# Patient Record
Sex: FEMALE | Race: BLACK OR AFRICAN AMERICAN | NOT HISPANIC OR LATINO | Employment: OTHER | URBAN - METROPOLITAN AREA
[De-identification: names, ages, dates, MRNs, and addresses within clinical notes are randomized per-mention and may not be internally consistent; named-entity substitution may affect disease eponyms.]

---

## 2023-03-20 DIAGNOSIS — M19.90 UNSPECIFIED OSTEOARTHRITIS, UNSPECIFIED SITE: ICD-10-CM

## 2023-03-20 DIAGNOSIS — E78.00 PURE HYPERCHOLESTEROLEMIA, UNSPECIFIED: ICD-10-CM

## 2023-03-20 RX ORDER — DICLOFENAC SODIUM 10 MG/G
GEL TOPICAL
Qty: 200 G | Refills: 0 | Status: SHIPPED | OUTPATIENT
Start: 2023-03-20 | End: 2023-04-26

## 2023-03-20 RX ORDER — ATORVASTATIN CALCIUM 20 MG/1
TABLET, FILM COATED ORAL
Qty: 90 TABLET | Refills: 0 | Status: SHIPPED | OUTPATIENT
Start: 2023-03-20 | End: 2023-08-28 | Stop reason: SDUPTHER

## 2023-04-17 ENCOUNTER — TELEPHONE (OUTPATIENT)
Dept: PRIMARY CARE | Facility: CLINIC | Age: 73
End: 2023-04-17
Payer: MEDICAID

## 2023-04-26 DIAGNOSIS — M19.90 UNSPECIFIED OSTEOARTHRITIS, UNSPECIFIED SITE: ICD-10-CM

## 2023-04-26 RX ORDER — DICLOFENAC SODIUM 10 MG/G
GEL TOPICAL
Qty: 200 G | Refills: 0 | Status: SHIPPED | OUTPATIENT
Start: 2023-04-26 | End: 2023-06-02

## 2023-06-02 DIAGNOSIS — M19.90 UNSPECIFIED OSTEOARTHRITIS, UNSPECIFIED SITE: ICD-10-CM

## 2023-06-02 RX ORDER — DICLOFENAC SODIUM 10 MG/G
GEL TOPICAL
Qty: 200 G | Refills: 0 | Status: SHIPPED | OUTPATIENT
Start: 2023-06-02 | End: 2023-07-06

## 2023-08-08 LAB
CHOLESTEROL (MG/DL) IN SER/PLAS: 163 MG/DL (ref 0–199)
CHOLESTEROL IN HDL (MG/DL) IN SER/PLAS: 57.5 MG/DL
CHOLESTEROL/HDL RATIO: 2.8
LDL: 88 MG/DL (ref 0–99)
TRIGLYCERIDE (MG/DL) IN SER/PLAS: 89 MG/DL (ref 0–149)
VLDL: 18 MG/DL (ref 0–40)

## 2023-08-25 PROBLEM — H43.819 VITREOUS DEGENERATION: Status: ACTIVE | Noted: 2023-08-25
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
## 2023-08-25 PROBLEM — Z86.010 HISTORY OF COLON POLYPS: Status: ACTIVE | Noted: 2023-08-25

## 2023-08-25 PROBLEM — M54.10 RADICULOPATHY: Status: ACTIVE | Noted: 2023-08-25

## 2023-08-25 PROBLEM — F17.200 NEEDS SMOKING CESSATION EDUCATION: Status: ACTIVE | Noted: 2023-08-25

## 2023-08-25 PROBLEM — K21.9 GERD (GASTROESOPHAGEAL REFLUX DISEASE): Status: ACTIVE | Noted: 2023-08-25

## 2023-08-25 PROBLEM — F17.200 TOBACCO USE DISORDER: Status: ACTIVE | Noted: 2023-08-25

## 2023-08-25 PROBLEM — M81.0 OSTEOPOROSIS: Status: ACTIVE | Noted: 2023-08-25

## 2023-08-25 PROBLEM — M26.629 TMJ PAIN DYSFUNCTION SYNDROME: Status: ACTIVE | Noted: 2023-08-25

## 2023-08-25 PROBLEM — N89.8 VAGINAL DISCHARGE: Status: ACTIVE | Noted: 2023-08-25

## 2023-08-25 PROBLEM — J30.9 ALLERGIC RHINITIS: Status: ACTIVE | Noted: 2023-08-25

## 2023-08-25 PROBLEM — S90.862A INSECT BITE OF LEFT FOOT: Status: ACTIVE | Noted: 2023-08-25

## 2023-08-25 PROBLEM — D72.829 LEUKOCYTOSIS: Status: ACTIVE | Noted: 2023-08-25

## 2023-08-25 PROBLEM — R00.1 BRADYCARDIA: Status: ACTIVE | Noted: 2023-08-25

## 2023-08-25 PROBLEM — H43.813 PVD (POSTERIOR VITREOUS DETACHMENT), BOTH EYES: Status: ACTIVE | Noted: 2023-08-25

## 2023-08-25 PROBLEM — N95.2 ATROPHY OF VAGINA: Status: ACTIVE | Noted: 2023-08-25

## 2023-08-25 PROBLEM — G89.29 CHRONIC PAIN: Status: ACTIVE | Noted: 2023-08-25

## 2023-08-25 PROBLEM — H52.4 BILATERAL PRESBYOPIA: Status: ACTIVE | Noted: 2023-08-25

## 2023-08-25 PROBLEM — H25.10 NUCLEAR SCLEROSIS: Status: ACTIVE | Noted: 2023-08-25

## 2023-08-25 PROBLEM — L02.91 ABSCESS: Status: ACTIVE | Noted: 2023-08-25

## 2023-08-25 PROBLEM — R73.9 HYPERGLYCEMIA: Status: ACTIVE | Noted: 2023-08-25

## 2023-08-25 PROBLEM — H52.03 HYPEROPIA OF BOTH EYES: Status: ACTIVE | Noted: 2023-08-25

## 2023-08-25 PROBLEM — E55.9 VITAMIN D DEFICIENCY: Status: ACTIVE | Noted: 2023-08-25

## 2023-08-25 PROBLEM — M81.0 AGE-RELATED OSTEOPOROSIS WITHOUT CURRENT PATHOLOGICAL FRACTURE: Status: ACTIVE | Noted: 2023-08-25

## 2023-08-25 PROBLEM — R21 RASH: Status: ACTIVE | Noted: 2023-08-25

## 2023-08-25 PROBLEM — N32.81 OAB (OVERACTIVE BLADDER): Status: ACTIVE | Noted: 2023-08-25

## 2023-08-25 PROBLEM — T40.2X5A CONSTIPATION DUE TO OPIOID THERAPY: Status: ACTIVE | Noted: 2023-08-25

## 2023-08-25 PROBLEM — R25.2 MUSCLE CRAMPS: Status: ACTIVE | Noted: 2023-08-25

## 2023-08-25 PROBLEM — H92.01 REFERRED OTALGIA OF RIGHT EAR: Status: ACTIVE | Noted: 2023-08-25

## 2023-08-25 PROBLEM — M75.52 BURSITIS OF LEFT SHOULDER: Status: ACTIVE | Noted: 2023-08-25

## 2023-08-25 PROBLEM — L29.9 ITCHING: Status: ACTIVE | Noted: 2023-08-25

## 2023-08-25 PROBLEM — M47.812 DJD (DEGENERATIVE JOINT DISEASE), CERVICAL: Status: ACTIVE | Noted: 2023-08-25

## 2023-08-25 PROBLEM — M54.50 LOW BACK PAIN: Status: ACTIVE | Noted: 2023-08-25

## 2023-08-25 PROBLEM — M19.90 OSTEOARTHRITIS: Status: ACTIVE | Noted: 2023-08-25

## 2023-08-25 PROBLEM — M62.831 CHARLEYHORSE: Status: ACTIVE | Noted: 2023-08-25

## 2023-08-25 PROBLEM — R06.2 WHEEZING WITHOUT DIAGNOSIS OF ASTHMA: Status: ACTIVE | Noted: 2023-08-25

## 2023-08-25 PROBLEM — G45.9 TIA (TRANSIENT ISCHEMIC ATTACK): Status: ACTIVE | Noted: 2023-08-25

## 2023-08-25 PROBLEM — L82.1 OTHER SEBORRHEIC KERATOSIS: Status: ACTIVE | Noted: 2018-01-10

## 2023-08-25 PROBLEM — M25.50 CHRONIC PAIN OF MULTIPLE JOINTS: Status: ACTIVE | Noted: 2023-08-25

## 2023-08-25 PROBLEM — D22.5 MELANOCYTIC NEVI OF TRUNK: Status: ACTIVE | Noted: 2018-01-10

## 2023-08-25 PROBLEM — I10 BENIGN ESSENTIAL HYPERTENSION: Status: ACTIVE | Noted: 2023-08-25

## 2023-08-25 PROBLEM — R10.2 PAIN, PELVIC, FEMALE: Status: ACTIVE | Noted: 2023-08-25

## 2023-08-25 PROBLEM — K22.2 SCHATZKI'S RING: Status: ACTIVE | Noted: 2023-08-25

## 2023-08-25 PROBLEM — E89.0 POSTOPERATIVE HYPOTHYROIDISM: Status: ACTIVE | Noted: 2023-08-25

## 2023-08-25 PROBLEM — E87.0 HYPERNATREMIA: Status: ACTIVE | Noted: 2023-08-25

## 2023-08-25 PROBLEM — S83.419A SPRAIN OF MCL (MEDIAL COLLATERAL LIGAMENT) OF KNEE: Status: ACTIVE | Noted: 2023-08-25

## 2023-08-25 PROBLEM — H04.123 DRY EYE SYNDROME OF BOTH LACRIMAL GLANDS: Status: ACTIVE | Noted: 2023-08-25

## 2023-08-25 PROBLEM — R10.9 ABDOMINAL PAIN: Status: ACTIVE | Noted: 2023-08-25

## 2023-08-25 PROBLEM — H02.886 MEIBOMIAN GLAND DYSFUNCTION (MGD) OF LEFT EYE: Status: ACTIVE | Noted: 2023-08-25

## 2023-08-25 PROBLEM — K57.90 DIVERTICULOSIS: Status: ACTIVE | Noted: 2023-08-25

## 2023-08-25 PROBLEM — M85.80 OSTEOPENIA: Status: ACTIVE | Noted: 2023-08-25

## 2023-08-25 PROBLEM — G89.29 CHRONIC PAIN OF MULTIPLE JOINTS: Status: ACTIVE | Noted: 2023-08-25

## 2023-08-25 PROBLEM — Z86.0100 HISTORY OF COLON POLYPS: Status: ACTIVE | Noted: 2023-08-25

## 2023-08-25 PROBLEM — M67.52 SYNOVIAL PLICA SYNDROME OF LEFT KNEE: Status: ACTIVE | Noted: 2023-08-25

## 2023-08-25 PROBLEM — L81.9 PIGMENTED SKIN LESION OF UNCERTAIN NATURE: Status: ACTIVE | Noted: 2023-08-25

## 2023-08-25 PROBLEM — M54.12 CERVICAL RADICULITIS: Status: ACTIVE | Noted: 2023-08-25

## 2023-08-25 PROBLEM — G89.18 POSTOPERATIVE PAIN: Status: ACTIVE | Noted: 2023-08-25

## 2023-08-25 PROBLEM — M25.569 KNEE PAIN: Status: ACTIVE | Noted: 2023-08-25

## 2023-08-25 PROBLEM — E78.00 HYPERCHOLESTEROLEMIA: Status: ACTIVE | Noted: 2023-08-25

## 2023-08-25 PROBLEM — R94.31 ELECTROCARDIOGRAM ABNORMAL: Status: ACTIVE | Noted: 2023-08-25

## 2023-08-25 PROBLEM — K59.03 CONSTIPATION DUE TO OPIOID THERAPY: Status: ACTIVE | Noted: 2023-08-25

## 2023-08-25 PROBLEM — H25.13 AGE-RELATED NUCLEAR CATARACT OF BOTH EYES: Status: ACTIVE | Noted: 2023-08-25

## 2023-08-25 PROBLEM — R09.81 NASAL SINUS CONGESTION: Status: ACTIVE | Noted: 2023-08-25

## 2023-08-25 PROBLEM — M75.01 ADHESIVE CAPSULITIS OF RIGHT SHOULDER: Status: ACTIVE | Noted: 2023-08-25

## 2023-08-25 PROBLEM — G35 MULTIPLE SCLEROSIS (MULTI): Status: ACTIVE | Noted: 2023-08-25

## 2023-08-25 PROBLEM — F17.200 NICOTINE DEPENDENCE: Status: ACTIVE | Noted: 2023-08-25

## 2023-08-25 PROBLEM — N18.31 CHRONIC KIDNEY DISEASE, STAGE 3A (MULTI): Status: ACTIVE | Noted: 2023-08-25

## 2023-08-25 PROBLEM — F12.90 MARIJUANA USE: Status: ACTIVE | Noted: 2023-08-25

## 2023-08-25 PROBLEM — E87.8 HYPERCHLOREMIA: Status: ACTIVE | Noted: 2023-08-25

## 2023-08-25 PROBLEM — R33.9 INCOMPLETE BLADDER EMPTYING: Status: ACTIVE | Noted: 2023-08-25

## 2023-08-28 ENCOUNTER — LAB (OUTPATIENT)
Dept: LAB | Facility: LAB | Age: 73
End: 2023-08-28
Payer: COMMERCIAL

## 2023-08-28 ENCOUNTER — OFFICE VISIT (OUTPATIENT)
Dept: PRIMARY CARE | Facility: CLINIC | Age: 73
End: 2023-08-28
Payer: COMMERCIAL

## 2023-08-28 VITALS
HEART RATE: 68 BPM | WEIGHT: 124.6 LBS | OXYGEN SATURATION: 98 % | SYSTOLIC BLOOD PRESSURE: 126 MMHG | RESPIRATION RATE: 18 BRPM | BODY MASS INDEX: 22.08 KG/M2 | DIASTOLIC BLOOD PRESSURE: 70 MMHG | HEIGHT: 63 IN

## 2023-08-28 DIAGNOSIS — N18.31 CHRONIC KIDNEY DISEASE, STAGE 3A (MULTI): ICD-10-CM

## 2023-08-28 DIAGNOSIS — F17.200 NICOTINE DEPENDENCE, UNCOMPLICATED, UNSPECIFIED NICOTINE PRODUCT TYPE: ICD-10-CM

## 2023-08-28 DIAGNOSIS — I10 BENIGN ESSENTIAL HYPERTENSION: ICD-10-CM

## 2023-08-28 DIAGNOSIS — Z12.11 SCREENING FOR MALIGNANT NEOPLASM OF COLON: ICD-10-CM

## 2023-08-28 DIAGNOSIS — R73.9 HYPERGLYCEMIA: ICD-10-CM

## 2023-08-28 DIAGNOSIS — F17.210 NICOTINE DEPENDENCE, CIGARETTES, UNCOMPLICATED: ICD-10-CM

## 2023-08-28 DIAGNOSIS — E78.00 HYPERCHOLESTEROLEMIA: ICD-10-CM

## 2023-08-28 DIAGNOSIS — E78.00 HYPERCHOLESTEROLEMIA: Primary | ICD-10-CM

## 2023-08-28 DIAGNOSIS — Z12.31 ENCOUNTER FOR SCREENING MAMMOGRAM FOR MALIGNANT NEOPLASM OF BREAST: ICD-10-CM

## 2023-08-28 DIAGNOSIS — E78.00 PURE HYPERCHOLESTEROLEMIA, UNSPECIFIED: ICD-10-CM

## 2023-08-28 PROBLEM — R26.89 ANTALGIC GAIT: Status: ACTIVE | Noted: 2023-06-16

## 2023-08-28 PROBLEM — M24.575 CONTRACTURE OF JOINT OF LEFT FOOT: Status: ACTIVE | Noted: 2023-06-16

## 2023-08-28 PROBLEM — M20.32 HALLUX VARUS (ACQUIRED), LEFT FOOT: Status: ACTIVE | Noted: 2023-06-16

## 2023-08-28 PROBLEM — R20.8 OTHER DISTURBANCES OF SKIN SENSATION: Status: ACTIVE | Noted: 2023-06-16

## 2023-08-28 PROBLEM — M77.42 METATARSALGIA OF LEFT FOOT: Status: ACTIVE | Noted: 2023-06-16

## 2023-08-28 PROBLEM — M20.42 HAMMERTOE OF SECOND TOE OF LEFT FOOT: Status: ACTIVE | Noted: 2023-06-16

## 2023-08-28 LAB
ALANINE AMINOTRANSFERASE (SGPT) (U/L) IN SER/PLAS: 9 U/L (ref 7–45)
ALBUMIN (G/DL) IN SER/PLAS: 4.2 G/DL (ref 3.4–5)
ALBUMIN (MG/L) IN URINE: 34.2 MG/L
ALBUMIN/CREATININE (UG/MG) IN URINE: 52.8 UG/MG CRT (ref 0–30)
ALKALINE PHOSPHATASE (U/L) IN SER/PLAS: 65 U/L (ref 33–136)
ANION GAP IN SER/PLAS: 14 MMOL/L (ref 10–20)
ASPARTATE AMINOTRANSFERASE (SGOT) (U/L) IN SER/PLAS: 13 U/L (ref 9–39)
BASOPHILS (10*3/UL) IN BLOOD BY AUTOMATED COUNT: 0.05 X10E9/L (ref 0–0.1)
BASOPHILS/100 LEUKOCYTES IN BLOOD BY AUTOMATED COUNT: 0.6 % (ref 0–2)
BILIRUBIN TOTAL (MG/DL) IN SER/PLAS: 0.5 MG/DL (ref 0–1.2)
CALCIUM (MG/DL) IN SER/PLAS: 9.6 MG/DL (ref 8.6–10.6)
CARBON DIOXIDE, TOTAL (MMOL/L) IN SER/PLAS: 26 MMOL/L (ref 21–32)
CHLORIDE (MMOL/L) IN SER/PLAS: 105 MMOL/L (ref 98–107)
CHOLESTEROL (MG/DL) IN SER/PLAS: 162 MG/DL (ref 0–199)
CHOLESTEROL IN HDL (MG/DL) IN SER/PLAS: 55.6 MG/DL
CHOLESTEROL/HDL RATIO: 2.9
CREATININE (MG/DL) IN SER/PLAS: 1.1 MG/DL (ref 0.5–1.05)
CREATININE (MG/DL) IN URINE: 64.8 MG/DL (ref 20–320)
EOSINOPHILS (10*3/UL) IN BLOOD BY AUTOMATED COUNT: 0.33 X10E9/L (ref 0–0.4)
EOSINOPHILS/100 LEUKOCYTES IN BLOOD BY AUTOMATED COUNT: 4.1 % (ref 0–6)
ERYTHROCYTE DISTRIBUTION WIDTH (RATIO) BY AUTOMATED COUNT: 14.8 % (ref 11.5–14.5)
ERYTHROCYTE MEAN CORPUSCULAR HEMOGLOBIN CONCENTRATION (G/DL) BY AUTOMATED: 31.8 G/DL (ref 32–36)
ERYTHROCYTE MEAN CORPUSCULAR VOLUME (FL) BY AUTOMATED COUNT: 88 FL (ref 80–100)
ERYTHROCYTES (10*6/UL) IN BLOOD BY AUTOMATED COUNT: 4.05 X10E12/L (ref 4–5.2)
ESTIMATED AVERAGE GLUCOSE FOR HBA1C: 111 MG/DL
GFR FEMALE: 53 ML/MIN/1.73M2
GLUCOSE (MG/DL) IN SER/PLAS: 87 MG/DL (ref 74–99)
HEMATOCRIT (%) IN BLOOD BY AUTOMATED COUNT: 35.8 % (ref 36–46)
HEMOGLOBIN (G/DL) IN BLOOD: 11.4 G/DL (ref 12–16)
HEMOGLOBIN A1C/HEMOGLOBIN TOTAL IN BLOOD: 5.5 %
IMMATURE GRANULOCYTES/100 LEUKOCYTES IN BLOOD BY AUTOMATED COUNT: 0.4 % (ref 0–0.9)
LDL: 80 MG/DL (ref 0–99)
LEUKOCYTES (10*3/UL) IN BLOOD BY AUTOMATED COUNT: 8 X10E9/L (ref 4.4–11.3)
LYMPHOCYTES (10*3/UL) IN BLOOD BY AUTOMATED COUNT: 2.07 X10E9/L (ref 0.8–3)
LYMPHOCYTES/100 LEUKOCYTES IN BLOOD BY AUTOMATED COUNT: 25.8 % (ref 13–44)
MONOCYTES (10*3/UL) IN BLOOD BY AUTOMATED COUNT: 0.55 X10E9/L (ref 0.05–0.8)
MONOCYTES/100 LEUKOCYTES IN BLOOD BY AUTOMATED COUNT: 6.9 % (ref 2–10)
NEUTROPHILS (10*3/UL) IN BLOOD BY AUTOMATED COUNT: 4.98 X10E9/L (ref 1.6–5.5)
NEUTROPHILS/100 LEUKOCYTES IN BLOOD BY AUTOMATED COUNT: 62.2 % (ref 40–80)
NRBC (PER 100 WBCS) BY AUTOMATED COUNT: 0 /100 WBC (ref 0–0)
PARATHYRIN INTACT (PG/ML) IN SER/PLAS: 51.9 PG/ML (ref 18.5–88)
PHOSPHATE (MG/DL) IN SER/PLAS: 4.1 MG/DL (ref 2.5–4.9)
PLATELETS (10*3/UL) IN BLOOD AUTOMATED COUNT: 386 X10E9/L (ref 150–450)
POTASSIUM (MMOL/L) IN SER/PLAS: 4.4 MMOL/L (ref 3.5–5.3)
PROTEIN TOTAL: 6.2 G/DL (ref 6.4–8.2)
SODIUM (MMOL/L) IN SER/PLAS: 141 MMOL/L (ref 136–145)
THYROTROPIN (MIU/L) IN SER/PLAS BY DETECTION LIMIT <= 0.05 MIU/L: 2.8 MIU/L (ref 0.44–3.98)
TRIGLYCERIDE (MG/DL) IN SER/PLAS: 133 MG/DL (ref 0–149)
UREA NITROGEN (MG/DL) IN SER/PLAS: 17 MG/DL (ref 6–23)
VLDL: 27 MG/DL (ref 0–40)

## 2023-08-28 PROCEDURE — 85025 COMPLETE CBC W/AUTO DIFF WBC: CPT

## 2023-08-28 PROCEDURE — 3074F SYST BP LT 130 MM HG: CPT | Performed by: INTERNAL MEDICINE

## 2023-08-28 PROCEDURE — 1125F AMNT PAIN NOTED PAIN PRSNT: CPT | Performed by: INTERNAL MEDICINE

## 2023-08-28 PROCEDURE — 1170F FXNL STATUS ASSESSED: CPT | Performed by: INTERNAL MEDICINE

## 2023-08-28 PROCEDURE — 84443 ASSAY THYROID STIM HORMONE: CPT

## 2023-08-28 PROCEDURE — 82043 UR ALBUMIN QUANTITATIVE: CPT

## 2023-08-28 PROCEDURE — 83970 ASSAY OF PARATHORMONE: CPT

## 2023-08-28 PROCEDURE — 80053 COMPREHEN METABOLIC PANEL: CPT

## 2023-08-28 PROCEDURE — 3078F DIAST BP <80 MM HG: CPT | Performed by: INTERNAL MEDICINE

## 2023-08-28 PROCEDURE — 82570 ASSAY OF URINE CREATININE: CPT

## 2023-08-28 PROCEDURE — 83036 HEMOGLOBIN GLYCOSYLATED A1C: CPT

## 2023-08-28 PROCEDURE — 36415 COLL VENOUS BLD VENIPUNCTURE: CPT

## 2023-08-28 PROCEDURE — 80061 LIPID PANEL: CPT

## 2023-08-28 PROCEDURE — 1159F MED LIST DOCD IN RCRD: CPT | Performed by: INTERNAL MEDICINE

## 2023-08-28 PROCEDURE — 84100 ASSAY OF PHOSPHORUS: CPT

## 2023-08-28 PROCEDURE — 99214 OFFICE O/P EST MOD 30 MIN: CPT | Performed by: INTERNAL MEDICINE

## 2023-08-28 RX ORDER — BUPROPION HYDROCHLORIDE 100 MG/1
100 TABLET, EXTENDED RELEASE ORAL DAILY PRN
COMMUNITY
Start: 2023-05-04

## 2023-08-28 RX ORDER — POLYMYXIN B SULFATE AND TRIMETHOPRIM 1; 10000 MG/ML; [USP'U]/ML
SOLUTION OPHTHALMIC
COMMUNITY
Start: 2023-04-18 | End: 2023-12-05 | Stop reason: ALTCHOICE

## 2023-08-28 RX ORDER — IBUPROFEN 800 MG/1
800 TABLET ORAL DAILY PRN
COMMUNITY
Start: 2023-01-20 | End: 2024-02-07 | Stop reason: SDUPTHER

## 2023-08-28 RX ORDER — HYDROCHLOROTHIAZIDE 25 MG/1
25 TABLET ORAL
COMMUNITY
End: 2023-11-10 | Stop reason: WASHOUT

## 2023-08-28 RX ORDER — IPRATROPIUM BROMIDE 42 UG/1
2 SPRAY, METERED NASAL 4 TIMES DAILY PRN
COMMUNITY

## 2023-08-28 RX ORDER — ESTRADIOL 10 UG/1
INSERT VAGINAL
COMMUNITY
End: 2023-12-05 | Stop reason: ALTCHOICE

## 2023-08-28 RX ORDER — ASPIRIN 81 MG/1
81 TABLET ORAL EVERY 24 HOURS
COMMUNITY

## 2023-08-28 RX ORDER — HYDRALAZINE HYDROCHLORIDE 25 MG/1
TABLET, FILM COATED ORAL 4 TIMES DAILY
COMMUNITY
Start: 2017-01-27 | End: 2023-08-28 | Stop reason: ALTCHOICE

## 2023-08-28 RX ORDER — LEVOTHYROXINE SODIUM 50 UG/1
50 TABLET ORAL
COMMUNITY
Start: 2023-05-05 | End: 2023-09-07 | Stop reason: SDUPTHER

## 2023-08-28 RX ORDER — FLUTICASONE PROPIONATE 50 MCG
1 SPRAY, SUSPENSION (ML) NASAL DAILY PRN
COMMUNITY
Start: 2016-08-15

## 2023-08-28 RX ORDER — ERGOCALCIFEROL 1.25 MG/1
1 CAPSULE ORAL
COMMUNITY
Start: 2017-03-15 | End: 2023-12-05 | Stop reason: ALTCHOICE

## 2023-08-28 RX ORDER — GABAPENTIN 300 MG/1
300 CAPSULE ORAL 4 TIMES DAILY
COMMUNITY
End: 2023-09-07 | Stop reason: SDUPTHER

## 2023-08-28 RX ORDER — METOPROLOL SUCCINATE 25 MG/1
25 TABLET, EXTENDED RELEASE ORAL DAILY
COMMUNITY

## 2023-08-28 RX ORDER — MUPIROCIN 20 MG/G
OINTMENT TOPICAL 3 TIMES DAILY
COMMUNITY
Start: 2022-04-20 | End: 2023-12-05 | Stop reason: ALTCHOICE

## 2023-08-28 RX ORDER — ATORVASTATIN CALCIUM 20 MG/1
40 TABLET, FILM COATED ORAL DAILY
Qty: 90 TABLET | Refills: 0 | Status: SHIPPED | OUTPATIENT
Start: 2023-08-28 | End: 2024-02-07 | Stop reason: DRUGHIGH

## 2023-08-28 RX ORDER — ALBUTEROL SULFATE 90 UG/1
2 AEROSOL, METERED RESPIRATORY (INHALATION) EVERY 6 HOURS PRN
COMMUNITY
Start: 2017-01-19

## 2023-08-28 RX ORDER — FAMOTIDINE 10 MG/1
1 TABLET ORAL 2 TIMES DAILY PRN
COMMUNITY
Start: 2020-07-02

## 2023-08-28 RX ORDER — SPIRONOLACTONE 25 MG
1 TABLET ORAL 2 TIMES DAILY
COMMUNITY
Start: 2017-10-02 | End: 2023-12-05 | Stop reason: ALTCHOICE

## 2023-08-28 RX ORDER — ERYTHROMYCIN 5 MG/G
OINTMENT OPHTHALMIC
COMMUNITY
Start: 2023-06-13 | End: 2023-12-05 | Stop reason: ALTCHOICE

## 2023-08-28 RX ORDER — CETIRIZINE HYDROCHLORIDE 10 MG/1
TABLET ORAL
COMMUNITY
Start: 2023-04-21 | End: 2024-06-06 | Stop reason: HOSPADM

## 2023-08-28 RX ORDER — CLONIDINE HYDROCHLORIDE 0.1 MG/1
TABLET ORAL
COMMUNITY
End: 2023-08-28 | Stop reason: ALTCHOICE

## 2023-08-28 RX ORDER — OLOPATADINE HYDROCHLORIDE 1 MG/ML
SOLUTION/ DROPS OPHTHALMIC
COMMUNITY
Start: 2023-04-21 | End: 2023-12-05 | Stop reason: ALTCHOICE

## 2023-08-28 RX ORDER — DIMETHYL FUMARATE 120 MG/1
1 CAPSULE ORAL EVERY 12 HOURS
COMMUNITY

## 2023-08-28 ASSESSMENT — ENCOUNTER SYMPTOMS
VOMITING: 0
LIGHT-HEADEDNESS: 0
CHILLS: 0
COUGH: 0
NAUSEA: 0
OCCASIONAL FEELINGS OF UNSTEADINESS: 0
NUMBNESS: 0
DIFFICULTY URINATING: 0
DIARRHEA: 0
SINUS PRESSURE: 0
DYSURIA: 0
FEVER: 0
NECK STIFFNESS: 0
DEPRESSION: 0
WHEEZING: 0
SORE THROAT: 0
MYALGIAS: 0
ARTHRALGIAS: 0
CONSTIPATION: 0
ABDOMINAL DISTENTION: 0
LOSS OF SENSATION IN FEET: 0
ABDOMINAL PAIN: 0
JOINT SWELLING: 0
FATIGUE: 0
NECK PAIN: 0
WEAKNESS: 0
DIAPHORESIS: 0
RHINORRHEA: 0
HEADACHES: 0
HEMATURIA: 0
SHORTNESS OF BREATH: 0
FREQUENCY: 0
APPETITE CHANGE: 0
DIZZINESS: 0
BACK PAIN: 0
PALPITATIONS: 0
BLOOD IN STOOL: 0

## 2023-08-28 ASSESSMENT — ACTIVITIES OF DAILY LIVING (ADL)
TAKING_MEDICATION: INDEPENDENT
GROCERY_SHOPPING: INDEPENDENT
BATHING: INDEPENDENT
DOING_HOUSEWORK: INDEPENDENT
DRESSING: INDEPENDENT
MANAGING_FINANCES: INDEPENDENT

## 2023-08-28 ASSESSMENT — PATIENT HEALTH QUESTIONNAIRE - PHQ9
SUM OF ALL RESPONSES TO PHQ9 QUESTIONS 1 AND 2: 0
2. FEELING DOWN, DEPRESSED OR HOPELESS: NOT AT ALL
1. LITTLE INTEREST OR PLEASURE IN DOING THINGS: NOT AT ALL

## 2023-08-28 NOTE — PROGRESS NOTES
"Subjective   Patient ID: Indu Red is a 72 y.o. female who presents for Follow-up.    HPI   She is doing well generally. She is very active.     Review of Systems   Constitutional:  Negative for appetite change, chills, diaphoresis, fatigue and fever.   HENT:  Negative for congestion, ear discharge, ear pain, hearing loss, postnasal drip, rhinorrhea, sinus pressure, sore throat and tinnitus.    Eyes:  Negative for visual disturbance.   Respiratory:  Negative for cough, shortness of breath and wheezing.    Cardiovascular:  Negative for chest pain, palpitations and leg swelling.   Gastrointestinal:  Negative for abdominal distention, abdominal pain, blood in stool, constipation, diarrhea, nausea and vomiting.   Genitourinary:  Negative for decreased urine volume, difficulty urinating, dysuria, frequency, hematuria and urgency.   Musculoskeletal:  Negative for arthralgias, back pain, gait problem, joint swelling, myalgias, neck pain and neck stiffness.   Skin:  Negative for rash.   Neurological:  Negative for dizziness, weakness, light-headedness, numbness and headaches.         Objective   /70 (BP Location: Left arm, Patient Position: Sitting, BP Cuff Size: Adult)   Pulse 68   Resp 18   Ht 1.6 m (5' 3\")   Wt 56.5 kg (124 lb 9.6 oz)   SpO2 98%   BMI 22.07 kg/m²     Physical Exam  Vitals reviewed.   Constitutional:       Appearance: Normal appearance. She is normal weight.   HENT:      Right Ear: Tympanic membrane and external ear normal.      Left Ear: Tympanic membrane and external ear normal.   Eyes:      Extraocular Movements: Extraocular movements intact.      Conjunctiva/sclera: Conjunctivae normal.      Pupils: Pupils are equal, round, and reactive to light.   Cardiovascular:      Rate and Rhythm: Normal rate and regular rhythm.      Pulses: Normal pulses.   Pulmonary:      Effort: Pulmonary effort is normal.      Breath sounds: Normal breath sounds.   Abdominal:      General: Bowel sounds are " normal.      Palpations: Abdomen is soft.   Musculoskeletal:         General: Normal range of motion.      Cervical back: Normal range of motion.   Skin:     General: Skin is warm and dry.   Neurological:      General: No focal deficit present.      Mental Status: She is oriented to person, place, and time.   Psychiatric:         Mood and Affect: Mood normal.         Behavior: Behavior normal.         Assessment/Plan   Problem List Items Addressed This Visit       Benign essential hypertension    Relevant Orders    CBC and Auto Differential    Comprehensive Metabolic Panel    Hemoglobin A1C    TSH with reflex to Free T4 if abnormal    Chronic kidney disease, stage 3a (CMS/HCC)     - Repeat CBC, CMP, urine microalbumin, urinalysis, vitamin D, phosphorus, PTH  -Medication list reviewed  -Counselled on low sodium diet and need to keep blood pressures well controlled  -Educated on avoidance of toxic medications such as NSAIDs (ibuprofen, Aleve, Motrin, diclofenac, Advil)  -Drink adequate quantities of water to remain hydrated.          Relevant Orders    CBC and Auto Differential    Comprehensive Metabolic Panel    Albumin , Urine Random    Phosphorus    Parathyroid Hormone, Intact    Hypercholesterolemia - Primary    Relevant Orders    Lipid Panel    Hyperglycemia    Relevant Orders    Hemoglobin A1C    Nicotine dependence     Counseled on smoking cessation.   She is on Wellbutrin, feels it is helping somewhat.          Relevant Orders    CT lung screening low dose    Encounter for screening mammogram for malignant neoplasm of breast    Relevant Orders    BI mammo bilateral screening tomosynthesis    Screening for malignant neoplasm of colon    Relevant Orders    Colonoscopy Screening    Nicotine dependence, cigarettes, uncomplicated    Relevant Orders    CT lung screening low dose     Other Visit Diagnoses       Pure hypercholesterolemia, unspecified        Relevant Medications    atorvastatin (Lipitor) 20 mg tablet         RTC in 4

## 2023-08-28 NOTE — LETTER
September 22, 2023     Indu Red  [id number] [place] [address]      Dear Ms. Pegueroeverardo:    Below are the results from your recent visit:    Mammogram is negative.  We will repeat in 1 year.       If you have any questions or concerns, please don't hesitate to call.         Sincerely,        Don Jett MD

## 2023-08-28 NOTE — ASSESSMENT & PLAN NOTE
- Repeat CBC, CMP, urine microalbumin, urinalysis, vitamin D, phosphorus, PTH  -Medication list reviewed  -Counselled on low sodium diet and need to keep blood pressures well controlled  -Educated on avoidance of toxic medications such as NSAIDs (ibuprofen, Aleve, Motrin, diclofenac, Advil)  -Drink adequate quantities of water to remain hydrated.

## 2023-08-28 NOTE — LETTER
September 22, 2023     Indu Red  [address] [place] [address]      Dear Ms. Red:    Below are the results from your recent visit:    Screening low-dose CT chest revealed a few benign/nonconcerning pulmonary nodules in the lung.  We will continue monitoring with repeat CT chest in 1 year       If you have any questions or concerns, please don't hesitate to call.         Sincerely,        Don Jett MD

## 2023-09-07 DIAGNOSIS — M19.90 UNSPECIFIED OSTEOARTHRITIS, UNSPECIFIED SITE: ICD-10-CM

## 2023-09-07 DIAGNOSIS — M54.40 CHRONIC LOW BACK PAIN WITH SCIATICA, SCIATICA LATERALITY UNSPECIFIED, UNSPECIFIED BACK PAIN LATERALITY: ICD-10-CM

## 2023-09-07 DIAGNOSIS — G35 MULTIPLE SCLEROSIS (MULTI): Primary | ICD-10-CM

## 2023-09-07 DIAGNOSIS — E89.0 POSTOPERATIVE HYPOTHYROIDISM: ICD-10-CM

## 2023-09-07 DIAGNOSIS — G89.29 OTHER CHRONIC PAIN: ICD-10-CM

## 2023-09-07 DIAGNOSIS — G89.29 CHRONIC LOW BACK PAIN WITH SCIATICA, SCIATICA LATERALITY UNSPECIFIED, UNSPECIFIED BACK PAIN LATERALITY: ICD-10-CM

## 2023-09-07 RX ORDER — VENLAFAXINE HYDROCHLORIDE 37.5 MG/1
25 CAPSULE, EXTENDED RELEASE ORAL DAILY
COMMUNITY
End: 2023-09-07 | Stop reason: WASHOUT

## 2023-09-07 RX ORDER — LEVOTHYROXINE SODIUM 50 UG/1
50 TABLET ORAL
Qty: 90 TABLET | Refills: 1 | Status: SHIPPED | OUTPATIENT
Start: 2023-09-07 | End: 2024-03-06

## 2023-09-07 RX ORDER — DICLOFENAC SODIUM 10 MG/G
4 GEL TOPICAL 2 TIMES DAILY PRN
Qty: 200 G | Refills: 5 | Status: SHIPPED | OUTPATIENT
Start: 2023-09-07 | End: 2024-05-06 | Stop reason: SDUPTHER

## 2023-09-07 RX ORDER — VENLAFAXINE 25 MG/1
25 TABLET ORAL 2 TIMES DAILY
COMMUNITY
End: 2023-09-07 | Stop reason: SDUPTHER

## 2023-09-07 RX ORDER — VENLAFAXINE 25 MG/1
25 TABLET ORAL 2 TIMES DAILY
Qty: 180 TABLET | Refills: 1 | Status: SHIPPED | OUTPATIENT
Start: 2023-09-07 | End: 2024-03-06

## 2023-09-07 RX ORDER — GABAPENTIN 300 MG/1
300 CAPSULE ORAL 4 TIMES DAILY
Qty: 360 CAPSULE | Refills: 0 | Status: SHIPPED | OUTPATIENT
Start: 2023-09-07 | End: 2023-11-01

## 2023-09-13 ENCOUNTER — TELEPHONE (OUTPATIENT)
Dept: PRIMARY CARE | Facility: CLINIC | Age: 73
End: 2023-09-13
Payer: MEDICAID

## 2023-09-13 DIAGNOSIS — M54.42 CHRONIC BILATERAL LOW BACK PAIN WITH BILATERAL SCIATICA: Primary | ICD-10-CM

## 2023-09-13 DIAGNOSIS — M54.41 CHRONIC BILATERAL LOW BACK PAIN WITH BILATERAL SCIATICA: Primary | ICD-10-CM

## 2023-09-13 DIAGNOSIS — G89.29 CHRONIC BILATERAL LOW BACK PAIN WITH BILATERAL SCIATICA: Primary | ICD-10-CM

## 2023-09-21 PROBLEM — I25.10 ARTERIOSCLEROTIC HEART DISEASE: Status: ACTIVE | Noted: 2023-09-21

## 2023-09-21 RX ORDER — LISINOPRIL AND HYDROCHLOROTHIAZIDE 10; 12.5 MG/1; MG/1
1 TABLET ORAL DAILY
COMMUNITY
Start: 2023-08-25 | End: 2024-06-06 | Stop reason: HOSPADM

## 2023-09-21 RX ORDER — TERIPARATIDE 250 UG/ML
20 INJECTION, SOLUTION SUBCUTANEOUS DAILY
COMMUNITY

## 2023-09-21 RX ORDER — TIZANIDINE 4 MG/1
1 TABLET ORAL 3 TIMES DAILY PRN
COMMUNITY
End: 2024-05-10

## 2023-09-21 RX ORDER — TIZANIDINE 2 MG/1
1 TABLET ORAL NIGHTLY
COMMUNITY
Start: 2022-10-14 | End: 2023-12-05 | Stop reason: ALTCHOICE

## 2023-09-21 RX ORDER — TRAMADOL HYDROCHLORIDE 50 MG/1
1 TABLET ORAL 2 TIMES DAILY PRN
COMMUNITY
End: 2024-02-22 | Stop reason: SDUPTHER

## 2023-09-21 RX ORDER — TERIFLUNOMIDE 14 MG/1
1 TABLET, FILM COATED ORAL 2 TIMES DAILY
COMMUNITY
End: 2024-02-07 | Stop reason: DRUGHIGH

## 2023-09-21 RX ORDER — VARENICLINE TARTRATE 1 MG/1
1 TABLET, FILM COATED ORAL 2 TIMES DAILY
COMMUNITY
Start: 2022-07-14 | End: 2023-12-05 | Stop reason: ALTCHOICE

## 2023-09-21 RX ORDER — CHOLECALCIFEROL (VITAMIN D3) 25 MCG
1 TABLET ORAL DAILY
COMMUNITY

## 2023-09-22 NOTE — RESULT ENCOUNTER NOTE
Screening low-dose CT chest revealed a few benign/nonconcerning pulmonary nodules in the lung.  We will continue monitoring with repeat CT chest in 1 year

## 2023-09-22 NOTE — RESULT ENCOUNTER NOTE
Screening low-dose CT chest revealed a few benign/noncancerous pulmonary nodules.  We will continue monitoring with yearly low-dose CT scans of the chest.

## 2023-10-12 ENCOUNTER — OFFICE VISIT (OUTPATIENT)
Dept: ORTHOPEDIC SURGERY | Facility: CLINIC | Age: 73
End: 2023-10-12
Payer: COMMERCIAL

## 2023-10-12 VITALS — BODY MASS INDEX: 21.97 KG/M2 | WEIGHT: 124 LBS | HEIGHT: 63 IN

## 2023-10-12 DIAGNOSIS — M25.511 BILATERAL SHOULDER PAIN, UNSPECIFIED CHRONICITY: Primary | ICD-10-CM

## 2023-10-12 DIAGNOSIS — M75.52 BILATERAL SHOULDER BURSITIS: ICD-10-CM

## 2023-10-12 DIAGNOSIS — M75.01 ADHESIVE CAPSULITIS OF RIGHT SHOULDER: ICD-10-CM

## 2023-10-12 DIAGNOSIS — M25.512 BILATERAL SHOULDER PAIN, UNSPECIFIED CHRONICITY: Primary | ICD-10-CM

## 2023-10-12 DIAGNOSIS — M75.51 BILATERAL SHOULDER BURSITIS: ICD-10-CM

## 2023-10-12 PROCEDURE — 1125F AMNT PAIN NOTED PAIN PRSNT: CPT | Performed by: ORTHOPAEDIC SURGERY

## 2023-10-12 PROCEDURE — 1159F MED LIST DOCD IN RCRD: CPT | Performed by: ORTHOPAEDIC SURGERY

## 2023-10-12 PROCEDURE — 2500000005 HC RX 250 GENERAL PHARMACY W/O HCPCS: Performed by: ORTHOPAEDIC SURGERY

## 2023-10-12 PROCEDURE — 99213 OFFICE O/P EST LOW 20 MIN: CPT | Performed by: ORTHOPAEDIC SURGERY

## 2023-10-12 PROCEDURE — 20610 DRAIN/INJ JOINT/BURSA W/O US: CPT | Mod: 50 | Performed by: ORTHOPAEDIC SURGERY

## 2023-10-12 PROCEDURE — 2500000004 HC RX 250 GENERAL PHARMACY W/ HCPCS (ALT 636 FOR OP/ED): Performed by: ORTHOPAEDIC SURGERY

## 2023-10-12 PROCEDURE — 1160F RVW MEDS BY RX/DR IN RCRD: CPT | Performed by: ORTHOPAEDIC SURGERY

## 2023-10-12 RX ORDER — TRIAMCINOLONE ACETONIDE 40 MG/ML
40 INJECTION, SUSPENSION INTRA-ARTICULAR; INTRAMUSCULAR
Status: COMPLETED | OUTPATIENT
Start: 2023-10-12 | End: 2023-10-12

## 2023-10-12 RX ORDER — LIDOCAINE HYDROCHLORIDE 10 MG/ML
1 INJECTION INFILTRATION; PERINEURAL
Status: COMPLETED | OUTPATIENT
Start: 2023-10-12 | End: 2023-10-12

## 2023-10-12 RX ADMIN — TRIAMCINOLONE ACETONIDE 40 MG: 400 INJECTION, SUSPENSION INTRA-ARTICULAR; INTRAMUSCULAR at 12:43

## 2023-10-12 RX ADMIN — LIDOCAINE HYDROCHLORIDE 1 ML: 10 INJECTION, SOLUTION INFILTRATION; PERINEURAL at 12:43

## 2023-10-12 ASSESSMENT — ENCOUNTER SYMPTOMS
OCCASIONAL FEELINGS OF UNSTEADINESS: 1
LOSS OF SENSATION IN FEET: 0
DEPRESSION: 0

## 2023-10-12 ASSESSMENT — PAIN - FUNCTIONAL ASSESSMENT: PAIN_FUNCTIONAL_ASSESSMENT: 0-10

## 2023-10-12 ASSESSMENT — PAIN SCALES - GENERAL: PAINLEVEL_OUTOF10: 10 - WORST POSSIBLE PAIN

## 2023-10-12 NOTE — PROGRESS NOTES
Subjective      Chief Complaint   Patient presents with    Right Shoulder - Follow-up    Left Shoulder - Follow-up        Past Surgical History:   Procedure Laterality Date    APPENDECTOMY  08/03/2016    Appendectomy    COLONOSCOPY  08/03/2016    Complete Colonoscopy    MR HEAD ANGIO WO IV CONTRAST  7/21/2023    MR HEAD ANGIO WO IV CONTRAST 7/21/2023 [facility]    MR NECK ANGIO WO IV CONTRAST  7/21/2023    MR NECK ANGIO WO IV CONTRAST 7/21/2023 [facility]    OOPHORECTOMY  08/03/2016    Oophorectomy    OTHER SURGICAL HISTORY  08/03/2016    Cervical Conization    OTHER SURGICAL HISTORY  08/03/2016    Thyroid Surgery Substernal Thyroidectomy Partial    OTHER SURGICAL HISTORY  08/03/2016    Salpingectomy    TOTAL VAGINAL HYSTERECTOMY  03/09/2017    Vaginal Hysterectomy        HPI  This 72 year old patient presents today with bilateral shoulder pain 8. The patient states that the bilateral shoulder pain has been present for months. The patient denies trauma or injury. The patient states that the bilateral shoulder pain is worse with and aggravated by reaching and lifting. The patient states that this shoulder pain is disabling and presents today to discuss further options. The patient states that they have tried tylenol and ibuprofen with no relief.    CARDIOLOGY:   Negative for chest pain, shortness of breath.   RESPIRATORY:   Negative for chest pain, shortness of breath.   MUSCULOSKELETAL:   See HPI for details.   NEUROLOGY:   Negative for tingling, numbness, weakness.    Objective      There were no vitals taken for this visit.     SHOULDER EXAM  Constitutional: Appears stated age. No apparent distress  Labored Breathing: No  Psychiatric: Normal mood and effect.   Neurological: alert and oriented x3  Skin: intact  HEENT: No bruising, otorrhea, rhinorrhea.  MUSCULOSKELETAL: Neck: No tenderness. No pain or limitation with range of motion. Back: No tenderness. Straight leg test negative bilaterally. bilateral shoulder: There  is tenderness anteriorly and laterally. Active abduction and active flexion are 0-120 degrees but with pain and guarding. There is pain with and limitation of active and passive internal and external rotation. Comparments are soft. Neurovascular is intact.  X-rays of the right and left shoulders done and read in the office today show mild osteoarthritis of the right and left shoulders with spurring at the acromioclavicular joint consistent with impingement of the right and left shoulders.  I reviewed these x-rays with the patient in the office today.    No images are attached to the encounter.    Indu was seen today for follow-up and follow-up.  Diagnoses and all orders for this visit:  Bilateral shoulder pain, unspecified chronicity (Primary)  Bilateral shoulder bursitis  -     Referral to Physical Therapy; Future  Adhesive capsulitis of right shoulder  -     Referral to Physical Therapy; Future   Patient ID: Indu Red is a 72 y.o. female.    L Inj/Asp: bilateral glenohumeral on 10/12/2023 12:43 PM  Indications: pain  Details: 22 G needle, anterolateral approach  Medications (Right): 40 mg triamcinolone acetonide 40 mg/mL; 1 mL lidocaine 10 mg/mL (1 %)  Medications (Left): 40 mg triamcinolone acetonide 40 mg/mL; 1 mL lidocaine 10 mg/mL (1 %)  Outcome: tolerated well, no immediate complications  Procedure, treatment alternatives, risks and benefits explained, specific risks discussed. Immediately prior to procedure a time out was called to verify the correct patient, procedure, equipment, support staff and site/side marked as required. Patient was prepped and draped in the usual sterile fashion.       Options are discussed with the patient in detail. The patient is instructed regarding activity modification, ice, physician directed at home gentle strengthening and ROM exercises, and the appropriate use of Tylenol as needed for pain with its potential adverse reactions and side effects. The patient understands.  The patient states that despite all the treatment listed above that this left and right shoulder pain is debilitating and  requests a discussion of further options. Cortisone injection to the left and right shoulder is discussed in the office today. This is done in the office today. See procedures below. Return as needed, Please note that this report has been produced using speech recognition software.  It may contain errors related to grammar, punctuation or spelling.  Electronically signed, but not reviewed.       Jarad Gaffney MD

## 2023-10-31 DIAGNOSIS — M54.40 CHRONIC LOW BACK PAIN WITH SCIATICA, SCIATICA LATERALITY UNSPECIFIED, UNSPECIFIED BACK PAIN LATERALITY: ICD-10-CM

## 2023-10-31 DIAGNOSIS — G89.29 CHRONIC LOW BACK PAIN WITH SCIATICA, SCIATICA LATERALITY UNSPECIFIED, UNSPECIFIED BACK PAIN LATERALITY: ICD-10-CM

## 2023-10-31 DIAGNOSIS — G89.29 OTHER CHRONIC PAIN: ICD-10-CM

## 2023-10-31 DIAGNOSIS — G35 MULTIPLE SCLEROSIS (MULTI): ICD-10-CM

## 2023-11-01 RX ORDER — GABAPENTIN 300 MG/1
300 CAPSULE ORAL 4 TIMES DAILY
Qty: 360 CAPSULE | Refills: 0 | Status: SHIPPED | OUTPATIENT
Start: 2023-11-01 | End: 2024-06-06 | Stop reason: HOSPADM

## 2023-11-06 ENCOUNTER — LAB (OUTPATIENT)
Dept: LAB | Facility: LAB | Age: 73
End: 2023-11-06
Payer: COMMERCIAL

## 2023-11-06 DIAGNOSIS — E78.00 PURE HYPERCHOLESTEROLEMIA, UNSPECIFIED: Primary | ICD-10-CM

## 2023-11-06 LAB
ALT SERPL W P-5'-P-CCNC: 13 U/L (ref 7–45)
CHOLEST SERPL-MCNC: 164 MG/DL (ref 0–199)
CHOLESTEROL/HDL RATIO: 2.7
HDLC SERPL-MCNC: 59.8 MG/DL
LDLC SERPL CALC-MCNC: 88 MG/DL
NON HDL CHOLESTEROL: 104 MG/DL (ref 0–149)
TRIGL SERPL-MCNC: 82 MG/DL (ref 0–149)
VLDL: 16 MG/DL (ref 0–40)

## 2023-11-06 PROCEDURE — 80061 LIPID PANEL: CPT

## 2023-11-06 PROCEDURE — 84460 ALANINE AMINO (ALT) (SGPT): CPT

## 2023-11-06 PROCEDURE — 36415 COLL VENOUS BLD VENIPUNCTURE: CPT

## 2023-11-10 ENCOUNTER — OFFICE VISIT (OUTPATIENT)
Dept: CARDIOLOGY | Facility: CLINIC | Age: 73
End: 2023-11-10
Payer: COMMERCIAL

## 2023-11-10 VITALS
SYSTOLIC BLOOD PRESSURE: 130 MMHG | WEIGHT: 127 LBS | HEART RATE: 63 BPM | DIASTOLIC BLOOD PRESSURE: 70 MMHG | OXYGEN SATURATION: 99 % | BODY MASS INDEX: 22.86 KG/M2

## 2023-11-10 DIAGNOSIS — F17.200 TOBACCO USE DISORDER: ICD-10-CM

## 2023-11-10 DIAGNOSIS — I10 BENIGN ESSENTIAL HYPERTENSION: ICD-10-CM

## 2023-11-10 DIAGNOSIS — E78.00 HYPERCHOLESTEROLEMIA: ICD-10-CM

## 2023-11-10 DIAGNOSIS — I25.10 ARTERIOSCLEROTIC HEART DISEASE: Primary | ICD-10-CM

## 2023-11-10 PROCEDURE — 1159F MED LIST DOCD IN RCRD: CPT | Performed by: INTERNAL MEDICINE

## 2023-11-10 PROCEDURE — 1126F AMNT PAIN NOTED NONE PRSNT: CPT | Performed by: INTERNAL MEDICINE

## 2023-11-10 PROCEDURE — 99214 OFFICE O/P EST MOD 30 MIN: CPT | Performed by: INTERNAL MEDICINE

## 2023-11-10 PROCEDURE — 1160F RVW MEDS BY RX/DR IN RCRD: CPT | Performed by: INTERNAL MEDICINE

## 2023-11-10 PROCEDURE — 3075F SYST BP GE 130 - 139MM HG: CPT | Performed by: INTERNAL MEDICINE

## 2023-11-10 PROCEDURE — 3078F DIAST BP <80 MM HG: CPT | Performed by: INTERNAL MEDICINE

## 2023-11-10 RX ORDER — ROSUVASTATIN CALCIUM 40 MG/1
40 TABLET, COATED ORAL DAILY
Qty: 90 TABLET | Refills: 3 | Status: SHIPPED | OUTPATIENT
Start: 2023-11-10 | End: 2024-06-06 | Stop reason: HOSPADM

## 2023-11-10 ASSESSMENT — ENCOUNTER SYMPTOMS
GASTROINTESTINAL NEGATIVE: 1
EYES NEGATIVE: 1
NEUROLOGICAL NEGATIVE: 1
CARDIOVASCULAR NEGATIVE: 1
DEPRESSION: 0
MUSCULOSKELETAL NEGATIVE: 1
OCCASIONAL FEELINGS OF UNSTEADINESS: 1
RESPIRATORY NEGATIVE: 1
LOSS OF SENSATION IN FEET: 0
CONSTITUTIONAL NEGATIVE: 1

## 2023-11-10 ASSESSMENT — PATIENT HEALTH QUESTIONNAIRE - PHQ9
SUM OF ALL RESPONSES TO PHQ9 QUESTIONS 1 & 2: 0
1. LITTLE INTEREST OR PLEASURE IN DOING THINGS: NOT AT ALL
2. FEELING DOWN, DEPRESSED OR HOPELESS: NOT AT ALL

## 2023-11-10 ASSESSMENT — PAIN SCALES - GENERAL: PAINLEVEL: 0-NO PAIN

## 2023-11-10 NOTE — ASSESSMENT & PLAN NOTE
Reviewed lipid panel:  Tchol 164 TG 82 HDL 59 LDL 88    Would like the LDL cholesterol to be less than 70 if possible.  We will transition from atorvastatin to rosuvastatin 40 mg once a day and recheck lipid panel on return.

## 2023-11-10 NOTE — ASSESSMENT & PLAN NOTE
Presumed ASHD based on mild abnormality on myocardial perfusion stress test.  Currently doing well with no chest pain or anginal type symptoms.

## 2023-11-10 NOTE — PROGRESS NOTES
Subjective   Indu Red is a 72 y.o. female.    Chief Complaint:  Follow-up (3 month follow up ASHD)    HPI  Clinically patient doing very well.  No chest pain or anginal type symptoms.  Remains physically active with no significant restrictions.  Review of Systems   Constitutional: Negative.   HENT: Negative.     Eyes: Negative.    Cardiovascular: Negative.    Respiratory: Negative.     Musculoskeletal: Negative.    Gastrointestinal: Negative.    Genitourinary: Negative.    Neurological: Negative.        Objective   Constitutional:       Appearance: Not in distress.   Eyes:      Conjunctiva/sclera: Conjunctivae normal.   Neck:      Vascular: JVD normal.   Pulmonary:      Breath sounds: Normal breath sounds. No wheezing. No rhonchi. No rales.   Cardiovascular:      Normal rate. Regular rhythm.      Murmurs: There is no murmur.      No gallop.  No click. No rub.   Abdominal:      Palpations: Abdomen is soft.   Neurological:      General: No focal deficit present.      Mental Status: Alert.         Lab Review:   Lab on 11/06/2023   Component Date Value    Cholesterol 11/06/2023 164     HDL-Cholesterol 11/06/2023 59.8     Cholesterol/HDL Ratio 11/06/2023 2.7     LDL Calculated 11/06/2023 88     VLDL 11/06/2023 16     Triglycerides 11/06/2023 82     Non HDL Cholesterol 11/06/2023 104     ALT 11/06/2023 13        Assessment/Plan   The primary encounter diagnosis was Arteriosclerotic heart disease. Diagnoses of Benign essential hypertension, Hypercholesterolemia, and Tobacco use disorder were also pertinent to this visit.    Arteriosclerotic heart disease  Presumed ASHD based on mild abnormality on myocardial perfusion stress test.  Currently doing well with no chest pain or anginal type symptoms.    Hypercholesterolemia  Reviewed lipid panel:  Tchol 164 TG 82 HDL 59 LDL 88    Would like the LDL cholesterol to be less than 70 if possible.  We will transition from atorvastatin to rosuvastatin 40 mg once a day and  recheck lipid panel on return.    Benign essential hypertension  Blood pressure is borderline, we will just stress hygienic measures at this time and follow blood pressure over time.

## 2023-11-10 NOTE — ASSESSMENT & PLAN NOTE
Blood pressure is borderline, we will just stress hygienic measures at this time and follow blood pressure over time.

## 2023-11-14 ENCOUNTER — CLINICAL SUPPORT (OUTPATIENT)
Dept: PRIMARY CARE | Facility: CLINIC | Age: 73
End: 2023-11-14
Payer: COMMERCIAL

## 2023-11-14 DIAGNOSIS — Z23 ENCOUNTER FOR IMMUNIZATION: ICD-10-CM

## 2023-11-14 PROCEDURE — 90662 IIV NO PRSV INCREASED AG IM: CPT | Performed by: INTERNAL MEDICINE

## 2023-11-14 PROCEDURE — G0008 ADMIN INFLUENZA VIRUS VAC: HCPCS | Performed by: INTERNAL MEDICINE

## 2023-12-05 ENCOUNTER — OFFICE VISIT (OUTPATIENT)
Dept: ORTHOPEDIC SURGERY | Facility: CLINIC | Age: 73
End: 2023-12-05
Payer: COMMERCIAL

## 2023-12-05 VITALS — WEIGHT: 126.98 LBS | BODY MASS INDEX: 22.86 KG/M2

## 2023-12-05 DIAGNOSIS — M75.01 ADHESIVE CAPSULITIS OF RIGHT SHOULDER: ICD-10-CM

## 2023-12-05 DIAGNOSIS — S43.491S OTHER SPRAIN OF RIGHT SHOULDER JOINT, SEQUELA: ICD-10-CM

## 2023-12-05 DIAGNOSIS — M25.512 BILATERAL SHOULDER PAIN, UNSPECIFIED CHRONICITY: Primary | ICD-10-CM

## 2023-12-05 DIAGNOSIS — M75.51 BILATERAL SHOULDER BURSITIS: ICD-10-CM

## 2023-12-05 DIAGNOSIS — M25.511 BILATERAL SHOULDER PAIN, UNSPECIFIED CHRONICITY: Primary | ICD-10-CM

## 2023-12-05 DIAGNOSIS — M75.52 BILATERAL SHOULDER BURSITIS: ICD-10-CM

## 2023-12-05 PROCEDURE — 1160F RVW MEDS BY RX/DR IN RCRD: CPT | Performed by: ORTHOPAEDIC SURGERY

## 2023-12-05 PROCEDURE — 1126F AMNT PAIN NOTED NONE PRSNT: CPT | Performed by: ORTHOPAEDIC SURGERY

## 2023-12-05 PROCEDURE — 99213 OFFICE O/P EST LOW 20 MIN: CPT | Performed by: ORTHOPAEDIC SURGERY

## 2023-12-05 PROCEDURE — 1159F MED LIST DOCD IN RCRD: CPT | Performed by: ORTHOPAEDIC SURGERY

## 2023-12-05 ASSESSMENT — PAIN DESCRIPTION - DESCRIPTORS: DESCRIPTORS: SHARP;THROBBING

## 2023-12-05 ASSESSMENT — ENCOUNTER SYMPTOMS
LOSS OF SENSATION IN FEET: 0
DEPRESSION: 0
OCCASIONAL FEELINGS OF UNSTEADINESS: 0

## 2023-12-05 ASSESSMENT — PATIENT HEALTH QUESTIONNAIRE - PHQ9
2. FEELING DOWN, DEPRESSED OR HOPELESS: NOT AT ALL
1. LITTLE INTEREST OR PLEASURE IN DOING THINGS: NOT AT ALL
SUM OF ALL RESPONSES TO PHQ9 QUESTIONS 1 AND 2: 0

## 2023-12-05 ASSESSMENT — PAIN SCALES - GENERAL: PAINLEVEL_OUTOF10: 10 - WORST POSSIBLE PAIN

## 2023-12-05 ASSESSMENT — PAIN - FUNCTIONAL ASSESSMENT: PAIN_FUNCTIONAL_ASSESSMENT: 0-10

## 2023-12-05 NOTE — PROGRESS NOTES
Subjective      Chief Complaint   Patient presents with    Right Shoulder - Pain        Past Surgical History:   Procedure Laterality Date    APPENDECTOMY  08/03/2016    Appendectomy    COLONOSCOPY  08/03/2016    Complete Colonoscopy    MR HEAD ANGIO WO IV CONTRAST  7/21/2023    MR HEAD ANGIO WO IV CONTRAST 7/21/2023 [facility]    MR NECK ANGIO WO IV CONTRAST  7/21/2023    MR NECK ANGIO WO IV CONTRAST 7/21/2023 [facility]    OOPHORECTOMY  08/03/2016    Oophorectomy    OTHER SURGICAL HISTORY  08/03/2016    Cervical Conization    OTHER SURGICAL HISTORY  08/03/2016    Thyroid Surgery Substernal Thyroidectomy Partial    OTHER SURGICAL HISTORY  08/03/2016    Salpingectomy    TOTAL VAGINAL HYSTERECTOMY  03/09/2017    Vaginal Hysterectomy        HPI  This 72 year old patient presents today with right shoulder pain 8. The patient states that the right shoulder pain has been present for months. The patient denies trauma or injury. The patient states that the right shoulder pain is worse with and aggravated by reaching and lifting. The patient states that this shoulder pain is disabling and presents today to discuss further options. The patient states that they have tried tylenol and ibuprofen with no relief.    CARDIOLOGY:   Negative for chest pain, shortness of breath.   RESPIRATORY:   Negative for chest pain, shortness of breath.   MUSCULOSKELETAL:   See HPI for details.   NEUROLOGY:   Negative for tingling, numbness, weakness.    Objective      There were no vitals taken for this visit.     SHOULDER EXAM  Constitutional: Appears stated age. No apparent distress  Labored Breathing: No  Psychiatric: Normal mood and effect.   Neurological: alert and oriented x3  Skin: intact  HEENT: No bruising, otorrhea, rhinorrhea.  MUSCULOSKELETAL: Neck: No tenderness. No pain or limitation with range of motion. Back: No tenderness. Straight leg test negative bilaterally. Right shoulder: There is tenderness anteriorly and laterally. Active  abduction and active flexion are 0-90 degrees but with pain and guarding. There is pain with and limitation of active and passive internal and external rotation. Comparments are soft. Neurovascular is intact.  X-rays of the right and left shoulders done and read in the office today show mild osteoarthritis of the right and left shoulders with spurring at the acromioclavicular joint consistent with impingement of the right and left shoulders.  I reviewed these x-rays with the patient in the office today.    No images are attached to the encounter.    Indu was seen today for pain.  Diagnoses and all orders for this visit:  Bilateral shoulder pain, unspecified chronicity (Primary)  Bilateral shoulder bursitis  Adhesive capsulitis of right shoulder   Patient ID: Indu Red is a 72 y.o. female.       Options are discussed with the patient in detail.  The patient is instructed regarding  continuing withactivity modification and risk for further injury with falling or trauma, ice, physician directed at home gentle strengthening and ROM exercises, and the appropriate use of Tylenol as needed for pain with its potential adverse reactions and side effects. The patient understands.  she states that despite all of the treatment listed above and also despite cortisone injections that this right shoulder pain is disabling and impairs her ability to do activities of daily living that are important to her including participating in activities with her family.  This needs to be evaluated with an MRI of the right shoulder which is ordered today.  Return after MRI is completed.Please note that this report has been produced using speech recognition software.  It may contain errors related to grammar, punctuation or spelling.  Electronically signed, but not reviewed.      Jarad Gaffney MD

## 2023-12-15 ENCOUNTER — ANCILLARY PROCEDURE (OUTPATIENT)
Dept: RADIOLOGY | Facility: CLINIC | Age: 73
End: 2023-12-15
Payer: COMMERCIAL

## 2023-12-15 DIAGNOSIS — M75.01 ADHESIVE CAPSULITIS OF RIGHT SHOULDER: ICD-10-CM

## 2023-12-15 DIAGNOSIS — S43.491S OTHER SPRAIN OF RIGHT SHOULDER JOINT, SEQUELA: ICD-10-CM

## 2023-12-15 PROCEDURE — 73221 MRI JOINT UPR EXTREM W/O DYE: CPT | Mod: RIGHT SIDE | Performed by: STUDENT IN AN ORGANIZED HEALTH CARE EDUCATION/TRAINING PROGRAM

## 2023-12-15 PROCEDURE — 73221 MRI JOINT UPR EXTREM W/O DYE: CPT | Mod: RT

## 2023-12-19 ENCOUNTER — OFFICE VISIT (OUTPATIENT)
Dept: ORTHOPEDIC SURGERY | Facility: CLINIC | Age: 73
End: 2023-12-19
Payer: COMMERCIAL

## 2023-12-19 VITALS — WEIGHT: 126.98 LBS | BODY MASS INDEX: 23.23 KG/M2

## 2023-12-19 DIAGNOSIS — S43.491S OTHER SPRAIN OF RIGHT SHOULDER JOINT, SEQUELA: ICD-10-CM

## 2023-12-19 DIAGNOSIS — M25.511 RIGHT SHOULDER PAIN, UNSPECIFIED CHRONICITY: ICD-10-CM

## 2023-12-19 DIAGNOSIS — M75.01 ADHESIVE CAPSULITIS OF RIGHT SHOULDER: Primary | ICD-10-CM

## 2023-12-19 DIAGNOSIS — M75.111 PARTIAL NONTRAUMATIC TEAR OF RIGHT ROTATOR CUFF: ICD-10-CM

## 2023-12-19 PROCEDURE — 1126F AMNT PAIN NOTED NONE PRSNT: CPT | Performed by: ORTHOPAEDIC SURGERY

## 2023-12-19 PROCEDURE — 99214 OFFICE O/P EST MOD 30 MIN: CPT | Performed by: ORTHOPAEDIC SURGERY

## 2023-12-19 PROCEDURE — 1159F MED LIST DOCD IN RCRD: CPT | Performed by: ORTHOPAEDIC SURGERY

## 2023-12-19 PROCEDURE — 1160F RVW MEDS BY RX/DR IN RCRD: CPT | Performed by: ORTHOPAEDIC SURGERY

## 2023-12-19 RX ORDER — VANCOMYCIN 1 G/200ML
1000 INJECTION, SOLUTION INTRAVENOUS ONCE
Status: CANCELLED | OUTPATIENT
Start: 2024-02-13 | End: 2023-12-19

## 2023-12-19 RX ORDER — IBUPROFEN 800 MG/1
800 TABLET ORAL 2 TIMES DAILY
Qty: 60 TABLET | Refills: 0 | Status: SHIPPED | OUTPATIENT
Start: 2023-12-19 | End: 2024-01-15

## 2023-12-19 RX ORDER — SODIUM CHLORIDE 9 MG/ML
100 INJECTION, SOLUTION INTRAVENOUS CONTINUOUS
Status: CANCELLED | OUTPATIENT
Start: 2024-02-13

## 2023-12-19 ASSESSMENT — PAIN DESCRIPTION - DESCRIPTORS: DESCRIPTORS: SHARP

## 2023-12-19 ASSESSMENT — LIFESTYLE VARIABLES: TOTAL SCORE: 0

## 2023-12-19 ASSESSMENT — PAIN - FUNCTIONAL ASSESSMENT: PAIN_FUNCTIONAL_ASSESSMENT: 0-10

## 2023-12-19 ASSESSMENT — PAIN SCALES - GENERAL: PAINLEVEL_OUTOF10: 10 - WORST POSSIBLE PAIN

## 2023-12-19 NOTE — PROGRESS NOTES
I am doing it along get it done on nine 1 year any more rashes manage thisSubjective      Chief Complaint   Patient presents with    Right Shoulder - Follow-up        Past Surgical History:   Procedure Laterality Date    APPENDECTOMY  08/03/2016    Appendectomy    COLONOSCOPY  08/03/2016    Complete Colonoscopy    MR HEAD ANGIO WO IV CONTRAST  7/21/2023    MR HEAD ANGIO WO IV CONTRAST 7/21/2023 [facility]    MR NECK ANGIO WO IV CONTRAST  7/21/2023    MR NECK ANGIO WO IV CONTRAST 7/21/2023 [facility]    OOPHORECTOMY  08/03/2016    Oophorectomy    OTHER SURGICAL HISTORY  08/03/2016    Cervical Conization    OTHER SURGICAL HISTORY  08/03/2016    Thyroid Surgery Substernal Thyroidectomy Partial    OTHER SURGICAL HISTORY  08/03/2016    Salpingectomy    TOTAL VAGINAL HYSTERECTOMY  03/09/2017    Vaginal Hysterectomy        HPI  This 72 year old patient presents today with right shoulder pain 8. The patient states that the right shoulder pain has been present for months. The patient denies trauma or injury. The patient states that the right shoulder pain is worse with and aggravated by reaching and lifting. The patient states that this shoulder pain is disabling and presents today to discuss further options. The patient states that they have tried tylenol and ibuprofen with no relief.    CARDIOLOGY:   Negative for chest pain, shortness of breath.   RESPIRATORY:   Negative for chest pain, shortness of breath.   MUSCULOSKELETAL:   See HPI for details.   NEUROLOGY:   Negative for tingling, numbness, weakness.    Objective      There were no vitals taken for this visit.     SHOULDER EXAM  Constitutional: Appears stated age. No apparent distress  Labored Breathing: No  Psychiatric: Normal mood and effect.   Neurological: alert and oriented x3  Skin: intact  HEENT: No bruising, otorrhea, rhinorrhea.  MUSCULOSKELETAL: Neck: No tenderness. No pain or limitation with range of motion. Back: No tenderness. Straight leg test negative  bilaterally. Right shoulder: There is tenderness anteriorly and laterally. Active abduction and active flexion are 0-90 degrees but with pain and guarding. There is pain with and limitation of active and passive internal and external rotation. Comparments are soft. Neurovascular is intact.  X-rays of the right and left shoulders done and read in the office today show mild osteoarthritis of the right and left shoulders with spurring at the acromioclavicular joint consistent with impingement of the right and left shoulders.  I reviewed these x-rays with the patient in the office today.    MR shoulder right wo IV contrast    Result Date: 12/15/2023  Interpreted By:  Joo Cantu and Guraya Sahejmeet STUDY: MRI of the right shoulder without IV contrast   INDICATION: Signs/Symptoms:right shoulder pain   COMPARISON: Shoulder radiographs 04/20/2020.   ACCESSION NUMBER(S): [id number]   ORDERING CLINICIAN: SLY LANE   TECHNIQUE: Multiplanar multisequence MRI of the right shoulder was performed without intravenous contrast.   FINDINGS: Acromioclavicular Joint:  There are moderate appearing degenerative changes of the acromioclavicular joint with downward facing osteophytosis noted. A moderate volume fluid is noted within the subacromial subdeltoid bursa.   Biceps Tendon: There is moderate intra-articular biceps tendinosis with an area of indistinctness noted immediately proximal to the intertubercular sulcus.   Rotator Cuff: There is high-grade, partial-thickness bursal surface tearing of the anterior and central supraspinatus tendon fibers, measuring 0.6 cm in the AP dimension. There is moderate associated tendinosis. Moderate infraspinatus tendinosis without tear.. Teres minor and subscapularis tendons are within normal limits.   Muscles:  Normal signal intensity and volume. No evidence of muscular edema or atrophy.   Labrum:  Evaluation of labrum is limited due to lack of intraarticular contrast. Diffuse  degenerative labral tearing is noted.   Articular Cartilage:  Diffuse mild articular cartilage loss of the glenohumeral joint is noted.   Bones:  The marrow signal of the humeral head is within normal limits. No evidence acute fracture or contusion.   Nerves:  No evidence of mass effect in the region of the quadrilateral space or suprascapular nerve.   Other: A moderate volume joint effusion is noted with multiple intra-articular bodies..       1. There is high-grade, partial-thickness, bursal tearing of the anterior and central supraspinatus tendon fibers, measuring approximately 6 mm in AP dimension. There is moderate supraspinatus and infraspinatus tendinosis. No muscle atrophy. 2. Moderate degenerative changes of the acromioclavicular joint are noted, there also mild degenerative changes of the glenohumeral joint. 3. There is moderate intra-articular biceps tendinosis with an area of segmental interstitial tearing not excluded immediately proximal to the intertubercular sulcus. 4. Moderate volume subacromial subdeltoid bursal fluid.   MACRO: None.   Signed by: Joo Cantu 12/15/2023 2:13 PM Dictation workstation:   [id number]      Indu was seen today for follow-up.  Diagnoses and all orders for this visit:  Adhesive capsulitis of right shoulder (Primary)  Right shoulder pain, unspecified chronicity  Other sprain of right shoulder joint, sequela  Partial nontraumatic tear of right rotator cuff   Patient ID: Indu Red is a 73 y.o. female.   options are discussed with the patient in detail. The patient is instructed regarding activity modification and risk for further injury with falling or trauma and the use of a right arm sling for protection and support, ice, and the appropriate use of Tylenol as needed for pain with its potential adverse reactions and side effects. The patient understands. The patient states that despite all of the treatment listed above, that this right shoulder pain is disabling and  impairs the patient´s ability to do activities of daily living that are important to her and participate in activities with her family despite treatment with activity modification, exercises, and cortisone injections.The patient requests a discussion of further options including surgical options. On physical examination, the patient has limitation with ROM of the right shoulder and pain with ROM of the right shoulder. MRI of the right shoulder shows that there is a  high-grade partial thickness  right rotator cuff tear.  the patient requests a discussion of further options including operative options. Repair of the right rotator cuff with indications, alternatives, potential risks including but not limited to risk of infection, blood clot, blood loss, nerve or blood vessel damage, stroke or death, the rehabilitation involved and the fact that no guarantee can be made were all discussed with the patient in detail. The patient understands, accepts and wishes to proceed with  right rotator cuff repair as the patient cannot complete normal activities of daily living that are important to the patient including activities of daily living. I agree. We will be setting this up for a time that is convenient to the patient and as the schedule allows. Please note that this report has been produced using speech recognition software. It may contain errors related to grammar, punctuation or spelling. Electronically signed, but not reviewed.       Addendum: I reviewed the note above on 1-.  In the physical examination of the note it confirms that active abduction and active flexion are only 0 to 90 degrees.  This is consistent with a significant loss of strength which would be considered strength being graded 3/5 because the patient cannot  actively flex or abduct the shoulder beyond 90 degrees which means she is unable to completely flex or abduct the shoulder versus gravity.  Again this is consistent with loss of  strength.  Jraad Gaffney MD

## 2024-01-02 ENCOUNTER — APPOINTMENT (OUTPATIENT)
Dept: PRIMARY CARE | Facility: CLINIC | Age: 74
End: 2024-01-02
Payer: MEDICARE

## 2024-01-15 DIAGNOSIS — M75.111 PARTIAL NONTRAUMATIC TEAR OF RIGHT ROTATOR CUFF: ICD-10-CM

## 2024-01-15 RX ORDER — IBUPROFEN 800 MG/1
800 TABLET ORAL 2 TIMES DAILY
Qty: 60 TABLET | Refills: 0 | Status: SHIPPED | OUTPATIENT
Start: 2024-01-15 | End: 2024-02-13 | Stop reason: HOSPADM

## 2024-01-22 ENCOUNTER — TELEPHONE (OUTPATIENT)
Dept: ORTHOPEDIC SURGERY | Facility: CLINIC | Age: 74
End: 2024-01-22
Payer: MEDICARE

## 2024-01-22 NOTE — TELEPHONE ENCOUNTER
Patient called with many questions. She has been in the hospital at [facility] for a cold, now has a UTI she has been placed on antibiotics.  I explained that I will let Dr. Gaffney know and all her information will pull over from the [facility].  She wants to make sure that a catheter is placed due to previous kidney issues.  I explained that I will tell Dr. Gaffney this as well, And to please let the RN at pre admitting.    She wanted to discuss what happens after the surgery and how she gets care.  I explained all HOME care and Home PT.

## 2024-01-23 DIAGNOSIS — E78.00 HYPERCHOLESTEROLEMIA: Primary | ICD-10-CM

## 2024-01-23 RX ORDER — ATORVASTATIN CALCIUM 40 MG/1
40 TABLET, FILM COATED ORAL DAILY
Qty: 90 TABLET | Refills: 4 | Status: SHIPPED | OUTPATIENT
Start: 2024-01-23 | End: 2024-06-06 | Stop reason: HOSPADM

## 2024-02-05 PROBLEM — N17.9 AKI (ACUTE KIDNEY INJURY) (CMS-HCC): Status: ACTIVE | Noted: 2024-01-07

## 2024-02-05 PROBLEM — R33.9 URINARY RETENTION: Status: ACTIVE | Noted: 2024-01-08

## 2024-02-05 PROBLEM — Z86.79 HISTORY OF HYPERTENSION: Status: ACTIVE | Noted: 2024-02-05

## 2024-02-05 PROBLEM — Z86.39 HISTORY OF THYROID DISORDER: Status: ACTIVE | Noted: 2024-02-05

## 2024-02-05 PROBLEM — D12.6 TUBULAR ADENOMA OF COLON: Status: ACTIVE | Noted: 2024-02-05

## 2024-02-05 PROBLEM — R79.89 ELEVATED TROPONIN: Status: ACTIVE | Noted: 2024-01-04

## 2024-02-05 PROBLEM — I10 HYPERTENSION: Status: ACTIVE | Noted: 2024-01-05

## 2024-02-05 PROBLEM — R10.13 EPIGASTRIC PAIN: Status: ACTIVE | Noted: 2024-01-05

## 2024-02-05 PROBLEM — D64.9 ANEMIA: Status: ACTIVE | Noted: 2024-02-05

## 2024-02-05 PROBLEM — R11.2 NAUSEA AND VOMITING: Status: ACTIVE | Noted: 2024-01-04

## 2024-02-05 RX ORDER — PANTOPRAZOLE SODIUM 40 MG/1
40 TABLET, DELAYED RELEASE ORAL
COMMUNITY
Start: 2024-01-09 | End: 2024-05-24 | Stop reason: SDUPTHER

## 2024-02-05 RX ORDER — ALENDRONATE SODIUM 70 MG/1
70 TABLET ORAL
COMMUNITY
Start: 2016-08-03

## 2024-02-05 RX ORDER — VALACYCLOVIR HYDROCHLORIDE 500 MG/1
500 TABLET, FILM COATED ORAL DAILY
COMMUNITY
Start: 2020-07-24 | End: 2024-06-06 | Stop reason: HOSPADM

## 2024-02-05 RX ORDER — LISINOPRIL 5 MG/1
5 TABLET ORAL DAILY
COMMUNITY
Start: 2020-03-04

## 2024-02-06 ENCOUNTER — OFFICE VISIT (OUTPATIENT)
Dept: PRIMARY CARE | Facility: CLINIC | Age: 74
End: 2024-02-06
Payer: MEDICARE

## 2024-02-06 ENCOUNTER — LAB (OUTPATIENT)
Dept: LAB | Facility: LAB | Age: 74
End: 2024-02-06
Payer: MEDICARE

## 2024-02-06 VITALS
BODY MASS INDEX: 22.26 KG/M2 | HEIGHT: 62 IN | WEIGHT: 121 LBS | RESPIRATION RATE: 18 BRPM | SYSTOLIC BLOOD PRESSURE: 130 MMHG | OXYGEN SATURATION: 98 % | DIASTOLIC BLOOD PRESSURE: 66 MMHG | HEART RATE: 70 BPM

## 2024-02-06 DIAGNOSIS — R79.9 ABNORMAL FINDING OF BLOOD CHEMISTRY, UNSPECIFIED: ICD-10-CM

## 2024-02-06 DIAGNOSIS — E78.00 HYPERCHOLESTEROLEMIA: ICD-10-CM

## 2024-02-06 DIAGNOSIS — Z00.00 HEALTHCARE MAINTENANCE: ICD-10-CM

## 2024-02-06 DIAGNOSIS — N30.01 ACUTE CYSTITIS WITH HEMATURIA: ICD-10-CM

## 2024-02-06 DIAGNOSIS — I10 BENIGN ESSENTIAL HYPERTENSION: Primary | ICD-10-CM

## 2024-02-06 DIAGNOSIS — Z00.00 MEDICARE ANNUAL WELLNESS VISIT, SUBSEQUENT: ICD-10-CM

## 2024-02-06 DIAGNOSIS — N18.31 CHRONIC KIDNEY DISEASE, STAGE 3A (MULTI): ICD-10-CM

## 2024-02-06 LAB
POC APPEARANCE, URINE: CLEAR
POC BILIRUBIN, URINE: NEGATIVE
POC BLOOD, URINE: ABNORMAL
POC COLOR, URINE: YELLOW
POC GLUCOSE, URINE: NEGATIVE MG/DL
POC KETONES, URINE: NEGATIVE MG/DL
POC LEUKOCYTES, URINE: NEGATIVE
POC NITRITE,URINE: NEGATIVE
POC PH, URINE: 5.5 PH
POC PROTEIN, URINE: NEGATIVE MG/DL
POC SPECIFIC GRAVITY, URINE: 1.01
POC UROBILINOGEN, URINE: 1 EU/DL
TSH SERPL-ACNC: 1.54 MIU/L (ref 0.44–3.98)

## 2024-02-06 PROCEDURE — 84443 ASSAY THYROID STIM HORMONE: CPT

## 2024-02-06 PROCEDURE — 80053 COMPREHEN METABOLIC PANEL: CPT

## 2024-02-06 PROCEDURE — 81002 URINALYSIS NONAUTO W/O SCOPE: CPT | Performed by: INTERNAL MEDICINE

## 2024-02-06 PROCEDURE — 1160F RVW MEDS BY RX/DR IN RCRD: CPT | Performed by: INTERNAL MEDICINE

## 2024-02-06 PROCEDURE — 1170F FXNL STATUS ASSESSED: CPT | Performed by: INTERNAL MEDICINE

## 2024-02-06 PROCEDURE — 83721 ASSAY OF BLOOD LIPOPROTEIN: CPT

## 2024-02-06 PROCEDURE — 82465 ASSAY BLD/SERUM CHOLESTEROL: CPT

## 2024-02-06 PROCEDURE — 36415 COLL VENOUS BLD VENIPUNCTURE: CPT

## 2024-02-06 PROCEDURE — 3078F DIAST BP <80 MM HG: CPT | Performed by: INTERNAL MEDICINE

## 2024-02-06 PROCEDURE — G0439 PPPS, SUBSEQ VISIT: HCPCS | Performed by: INTERNAL MEDICINE

## 2024-02-06 PROCEDURE — 99397 PER PM REEVAL EST PAT 65+ YR: CPT | Performed by: INTERNAL MEDICINE

## 2024-02-06 PROCEDURE — 85025 COMPLETE CBC W/AUTO DIFF WBC: CPT

## 2024-02-06 PROCEDURE — 3075F SYST BP GE 130 - 139MM HG: CPT | Performed by: INTERNAL MEDICINE

## 2024-02-06 PROCEDURE — 87086 URINE CULTURE/COLONY COUNT: CPT

## 2024-02-06 PROCEDURE — 83718 ASSAY OF LIPOPROTEIN: CPT

## 2024-02-06 PROCEDURE — 1126F AMNT PAIN NOTED NONE PRSNT: CPT | Performed by: INTERNAL MEDICINE

## 2024-02-06 PROCEDURE — 1159F MED LIST DOCD IN RCRD: CPT | Performed by: INTERNAL MEDICINE

## 2024-02-06 PROCEDURE — 83036 HEMOGLOBIN GLYCOSYLATED A1C: CPT

## 2024-02-06 PROCEDURE — 99214 OFFICE O/P EST MOD 30 MIN: CPT | Performed by: INTERNAL MEDICINE

## 2024-02-06 RX ORDER — TERIPARATIDE 250 UG/ML
INJECTION, SOLUTION SUBCUTANEOUS
Refills: 0 | OUTPATIENT
Start: 2024-02-06

## 2024-02-06 RX ORDER — CIPROFLOXACIN 500 MG/1
500 TABLET ORAL 2 TIMES DAILY
Qty: 10 TABLET | Refills: 0 | Status: ON HOLD | OUTPATIENT
Start: 2024-02-06 | End: 2024-02-13

## 2024-02-06 ASSESSMENT — ENCOUNTER SYMPTOMS
ABDOMINAL PAIN: 0
WEAKNESS: 0
OCCASIONAL FEELINGS OF UNSTEADINESS: 0
RHINORRHEA: 0
SINUS PRESSURE: 0
NUMBNESS: 0
COUGH: 0
JOINT SWELLING: 0
NECK PAIN: 0
DYSURIA: 0
BLOOD IN STOOL: 0
SHORTNESS OF BREATH: 0
NECK STIFFNESS: 0
FEVER: 0
MYALGIAS: 0
DIZZINESS: 0
HEMATURIA: 0
APPETITE CHANGE: 0
SORE THROAT: 0
ARTHRALGIAS: 0
DIARRHEA: 0
LOSS OF SENSATION IN FEET: 0
FATIGUE: 0
FREQUENCY: 0
CHILLS: 0
PALPITATIONS: 0
HEADACHES: 0
BACK PAIN: 0
DIFFICULTY URINATING: 0
DIAPHORESIS: 0
NAUSEA: 0
CONSTIPATION: 0
LIGHT-HEADEDNESS: 0
VOMITING: 0
WHEEZING: 0
ABDOMINAL DISTENTION: 0
DEPRESSION: 0

## 2024-02-06 ASSESSMENT — PATIENT HEALTH QUESTIONNAIRE - PHQ9
1. LITTLE INTEREST OR PLEASURE IN DOING THINGS: NOT AT ALL
2. FEELING DOWN, DEPRESSED OR HOPELESS: NOT AT ALL
SUM OF ALL RESPONSES TO PHQ9 QUESTIONS 1 AND 2: 0

## 2024-02-06 ASSESSMENT — ACTIVITIES OF DAILY LIVING (ADL)
DRESSING: INDEPENDENT
BATHING: INDEPENDENT
GROCERY_SHOPPING: INDEPENDENT
MANAGING_FINANCES: INDEPENDENT
TAKING_MEDICATION: INDEPENDENT
DOING_HOUSEWORK: INDEPENDENT

## 2024-02-06 NOTE — ASSESSMENT & PLAN NOTE
-BP above target goal 130/80  -CMP, TSH ordered  -Continue current antihypertensives  -Keep BP log  -Educated about adverse effects of uncontrolled blood pressure,    -Low sodium diet, regular exercise recommended

## 2024-02-06 NOTE — PROGRESS NOTES
"Subjective   Patient ID: Indu Red is a 73 y.o. female who presents for Medicare Annual Wellness Visit Subsequent (Bone density order ).    HPI   She presents for follow-up with complaint of dysuria. She was recently treated for UTI, completed nitrofurantoin.     Review of Systems   Constitutional:  Negative for appetite change, chills, diaphoresis, fatigue and fever.   HENT:  Negative for congestion, ear discharge, ear pain, hearing loss, postnasal drip, rhinorrhea, sinus pressure, sore throat and tinnitus.    Eyes:  Negative for visual disturbance.   Respiratory:  Negative for cough, shortness of breath and wheezing.    Cardiovascular:  Negative for chest pain, palpitations and leg swelling.   Gastrointestinal:  Negative for abdominal distention, abdominal pain, blood in stool, constipation, diarrhea, nausea and vomiting.   Genitourinary:  Negative for decreased urine volume, difficulty urinating, dysuria, frequency, hematuria and urgency.   Musculoskeletal:  Negative for arthralgias, back pain, gait problem, joint swelling, myalgias, neck pain and neck stiffness.   Skin:  Negative for rash.   Neurological:  Negative for dizziness, weakness, light-headedness, numbness and headaches.       Objective   /66   Pulse 70   Resp 18   Ht 1.575 m (5' 2\")   Wt 54.9 kg (121 lb)   SpO2 98%   BMI 22.13 kg/m²     Physical Exam  Vitals reviewed.   Constitutional:       Appearance: Normal appearance. She is normal weight.   HENT:      Right Ear: Tympanic membrane and external ear normal.      Left Ear: Tympanic membrane and external ear normal.   Eyes:      Extraocular Movements: Extraocular movements intact.      Conjunctiva/sclera: Conjunctivae normal.      Pupils: Pupils are equal, round, and reactive to light.   Cardiovascular:      Rate and Rhythm: Normal rate and regular rhythm.      Pulses: Normal pulses.   Pulmonary:      Effort: Pulmonary effort is normal.      Breath sounds: Normal breath sounds. "   Abdominal:      General: Bowel sounds are normal.      Palpations: Abdomen is soft.   Musculoskeletal:         General: Normal range of motion.      Cervical back: Normal range of motion.   Skin:     General: Skin is warm and dry.   Neurological:      General: No focal deficit present.      Mental Status: She is oriented to person, place, and time.   Psychiatric:         Mood and Affect: Mood normal.         Behavior: Behavior normal.           Assessment/Plan   Problem List Items Addressed This Visit             ICD-10-CM    Benign essential hypertension - Primary I10     -BP above target goal 130/80  -CMP, TSH ordered  -Continue current antihypertensives  -Keep BP log  -Educated about adverse effects of uncontrolled blood pressure,    -Low sodium diet, regular exercise recommended           Chronic kidney disease, stage 3a (CMS/HCC) N18.31     - Repeat CBC, CMP, urine microalbumin, urinalysis  -Medication list reviewed  -Counselled on low sodium diet and need to keep blood pressures well controlled  -Educated on avoidance of toxic medications such as NSAIDs (ibuprofen, Aleve, Motrin, diclofenac, Advil)  -Drink adequate quantities of water to remain hydrated.          Hypercholesterolemia E78.00     Repeat lipid panel  Continue rosuvastatin 40 mg         Healthcare maintenance Z00.00    Relevant Orders    CBC and Auto Differential    Cholesterol, LDL Direct    Comprehensive Metabolic Panel    Hemoglobin A1C    Lipid Panel Non-Fasting    TSH with reflex to Free T4 if abnormal    POCT UA (nonautomated) manually resulted (Completed)    Urine Culture    Medicare annual wellness visit, subsequent Z00.00    Acute cystitis with hematuria N30.01     She was recently treated with nitrofurantoin   UA revealed blood but no LE, nitrites  UA sent for culture          Relevant Medications    ciprofloxacin (Cipro) 500 mg tablet     Other Visit Diagnoses         Codes    Abnormal finding of blood chemistry, unspecified     R79.9     Relevant Orders    CBC and Auto Differential    Cholesterol, LDL Direct          RTC in 3 mo

## 2024-02-06 NOTE — ASSESSMENT & PLAN NOTE
She was recently treated with nitrofurantoin   UA revealed blood but no LE, nitrites  UA sent for culture

## 2024-02-06 NOTE — LETTER
February 12, 2024     Indu PADMINI Neda  [address] [place] [address]      Dear Ms. Red:    Below are the results from your recent visit:    CKD stage 3, stable: I recommend eating a healthy low sodium diet, avoid medications which can harm the kidneys such as NSAIDs (ibuprofen, Aleve, Motrin, diclofenac, Advil) and drinking adequate quantities of water to remain hydrated. It is also important to keep blood pressures and blood sugars well controlled to avoid further decline in kidney function. We will continue to monitor.     Anemia of Ckd. Monitor.     Labs were otherwise unremarkable.         If you have any questions or concerns, please don't hesitate to call.

## 2024-02-06 NOTE — ASSESSMENT & PLAN NOTE
- Repeat CBC, CMP, urine microalbumin, urinalysis  -Medication list reviewed  -Counselled on low sodium diet and need to keep blood pressures well controlled  -Educated on avoidance of toxic medications such as NSAIDs (ibuprofen, Aleve, Motrin, diclofenac, Advil)  -Drink adequate quantities of water to remain hydrated.

## 2024-02-06 NOTE — LETTER
February 12, 2024     Indu W[place] eNda  [address] [place] [address]      Dear Ms. Red:    Below are the results from your recent visit:       Urine did not grow any bacteria. Your UTI has been completely treated with the course of antibiotics you completed.  Symptoms are likely related from bladder irritation from previous bladder infection.  Please drink adequate water 64 ounces per day allow time for inflammation to heal.       If you have any questions or concerns, please don't hesitate to call.         Sincerely,        Don Jett MD

## 2024-02-07 ENCOUNTER — TELEPHONE (OUTPATIENT)
Dept: ORTHOPEDIC SURGERY | Facility: CLINIC | Age: 74
End: 2024-02-07

## 2024-02-07 ENCOUNTER — PRE-ADMISSION TESTING (OUTPATIENT)
Dept: PREADMISSION TESTING | Facility: HOSPITAL | Age: 74
End: 2024-02-07
Payer: MEDICARE

## 2024-02-07 VITALS
RESPIRATION RATE: 16 BRPM | HEART RATE: 63 BPM | BODY MASS INDEX: 22.92 KG/M2 | DIASTOLIC BLOOD PRESSURE: 70 MMHG | WEIGHT: 124.56 LBS | SYSTOLIC BLOOD PRESSURE: 138 MMHG | TEMPERATURE: 98.6 F | HEIGHT: 62 IN | OXYGEN SATURATION: 100 %

## 2024-02-07 DIAGNOSIS — S46.011A TRAUMATIC INCOMPLETE TEAR OF RIGHT ROTATOR CUFF, INITIAL ENCOUNTER: Primary | ICD-10-CM

## 2024-02-07 LAB
ALBUMIN SERPL BCP-MCNC: 4.3 G/DL (ref 3.4–5)
ALP SERPL-CCNC: 72 U/L (ref 33–136)
ALT SERPL W P-5'-P-CCNC: 6 U/L (ref 7–45)
ANION GAP SERPL CALC-SCNC: 13 MMOL/L (ref 10–20)
AST SERPL W P-5'-P-CCNC: 10 U/L (ref 9–39)
BACTERIA UR CULT: NORMAL
BASOPHILS # BLD AUTO: 0.08 X10*3/UL (ref 0–0.1)
BASOPHILS NFR BLD AUTO: 1 %
BILIRUB SERPL-MCNC: 0.7 MG/DL (ref 0–1.2)
BUN SERPL-MCNC: 17 MG/DL (ref 6–23)
CALCIUM SERPL-MCNC: 9.5 MG/DL (ref 8.6–10.6)
CHLORIDE SERPL-SCNC: 105 MMOL/L (ref 98–107)
CHOLEST SERPL-MCNC: 172 MG/DL (ref 0–199)
CHOLESTEROL/HDL RATIO: 3.2
CO2 SERPL-SCNC: 27 MMOL/L (ref 21–32)
CREAT SERPL-MCNC: 1.19 MG/DL (ref 0.5–1.05)
EGFRCR SERPLBLD CKD-EPI 2021: 48 ML/MIN/1.73M*2
EOSINOPHIL # BLD AUTO: 0.27 X10*3/UL (ref 0–0.4)
EOSINOPHIL NFR BLD AUTO: 3.5 %
ERYTHROCYTE [DISTWIDTH] IN BLOOD BY AUTOMATED COUNT: 15.7 % (ref 11.5–14.5)
EST. AVERAGE GLUCOSE BLD GHB EST-MCNC: 114 MG/DL
GLUCOSE SERPL-MCNC: 100 MG/DL (ref 74–99)
HBA1C MFR BLD: 5.6 %
HCT VFR BLD AUTO: 34.1 % (ref 36–46)
HDLC SERPL-MCNC: 53.8 MG/DL
HGB BLD-MCNC: 10.8 G/DL (ref 12–16)
IMM GRANULOCYTES # BLD AUTO: 0.02 X10*3/UL (ref 0–0.5)
IMM GRANULOCYTES NFR BLD AUTO: 0.3 % (ref 0–0.9)
LDLC SERPL DIRECT ASSAY-MCNC: 92 MG/DL (ref 0–129)
LYMPHOCYTES # BLD AUTO: 2.2 X10*3/UL (ref 0.8–3)
LYMPHOCYTES NFR BLD AUTO: 28.5 %
MCH RBC QN AUTO: 29 PG (ref 26–34)
MCHC RBC AUTO-ENTMCNC: 31.7 G/DL (ref 32–36)
MCV RBC AUTO: 91 FL (ref 80–100)
MONOCYTES # BLD AUTO: 0.71 X10*3/UL (ref 0.05–0.8)
MONOCYTES NFR BLD AUTO: 9.2 %
NEUTROPHILS # BLD AUTO: 4.44 X10*3/UL (ref 1.6–5.5)
NEUTROPHILS NFR BLD AUTO: 57.5 %
NON-HDL CHOLESTEROL: 118 MG/DL (ref 0–149)
NRBC BLD-RTO: 0 /100 WBCS (ref 0–0)
PLATELET # BLD AUTO: 328 X10*3/UL (ref 150–450)
POTASSIUM SERPL-SCNC: 4.1 MMOL/L (ref 3.5–5.3)
PROT SERPL-MCNC: 6.7 G/DL (ref 6.4–8.2)
RBC # BLD AUTO: 3.73 X10*6/UL (ref 4–5.2)
SODIUM SERPL-SCNC: 141 MMOL/L (ref 136–145)
WBC # BLD AUTO: 7.7 X10*3/UL (ref 4.4–11.3)

## 2024-02-07 PROCEDURE — 99203 OFFICE O/P NEW LOW 30 MIN: CPT | Performed by: PHYSICIAN ASSISTANT

## 2024-02-07 ASSESSMENT — DUKE ACTIVITY SCORE INDEX (DASI)
CAN YOU PARTICIPATE IN MODERATE RECREATIONAL ACTIVITIES LIKE GOLF, BOWLING, DANCING, DOUBLES TENNIS OR THROWING A BASEBALL OR FOOTBALL: YES
CAN YOU WALK A BLOCK OR TWO ON LEVEL GROUND: YES
CAN YOU DO MODERATE WORK AROUND THE HOUSE LIKE VACUUMING, SWEEPING FLOORS OR CARRYING GROCERIES: YES
CAN YOU RUN A SHORT DISTANCE: YES
CAN YOU DO HEAVY WORK AROUND THE HOUSE LIKE SCRUBBING FLOORS OR LIFTING AND MOVING HEAVY FURNITURE: YES
CAN YOU CLIMB A FLIGHT OF STAIRS OR WALK UP A HILL: YES
CAN YOU HAVE SEXUAL RELATIONS: YES
CAN YOU DO LIGHT WORK AROUND THE HOUSE LIKE DUSTING OR WASHING DISHES: YES
CAN YOU PARTICIPATE IN STRENOUS SPORTS LIKE SWIMMING, SINGLES TENNIS, FOOTBALL, BASKETBALL, OR SKIING: YES
TOTAL_SCORE: 58.2
CAN YOU WALK INDOORS, SUCH AS AROUND YOUR HOUSE: YES
CAN YOU TAKE CARE OF YOURSELF (EAT, DRESS, BATHE, OR USE TOILET): YES
CAN YOU DO YARD WORK LIKE RAKING LEAVES, WEEDING OR PUSHING A MOWER: YES
DASI METS SCORE: 9.9

## 2024-02-07 ASSESSMENT — CHADS2 SCORE
PRIOR STROKE OR TIA OR THROMBOEMBOLISM: NO
HYPERTENSION: YES
CHF: NO
AGE GREATER THAN OR EQUAL TO 75: NO
DIABETES: NO
CHADS2 SCORE: 1

## 2024-02-07 ASSESSMENT — ACTIVITIES OF DAILY LIVING (ADL): EFFECT OF PAIN ON DAILY ACTIVITIES: INCREASES WITH ACTIVITY

## 2024-02-07 ASSESSMENT — PAIN DESCRIPTION - DESCRIPTORS: DESCRIPTORS: SHARP

## 2024-02-07 ASSESSMENT — PAIN SCALES - GENERAL: PAINLEVEL_OUTOF10: 10 - WORST POSSIBLE PAIN

## 2024-02-07 ASSESSMENT — PAIN - FUNCTIONAL ASSESSMENT: PAIN_FUNCTIONAL_ASSESSMENT: 0-10

## 2024-02-07 ASSESSMENT — LIFESTYLE VARIABLES: SMOKING_STATUS: SMOKER

## 2024-02-07 NOTE — H&P (VIEW-ONLY)
CPM/PAT Evaluation       Name: Indu Red (Indu Red)  /Age: 1950/73 y.o.     In-Person       Chief Complaint: Rotator cuff tear    HPI 23-year-old right-hand-dominant female complains of right shoulder pain for the past 5 to 10 years.  Patient states pain has gotten progressively worse.  Patient states she injured her right shoulder 20+ years ago.  Patient states she has had injections that are no longer helping.  Patient states she also has some numbness and tingling in her left arm and shoulder.  Patient has a history of postop urinary retention and is very concerned.  She also has history of hypertension, hiatal hernia with GERD, hypothyroidism and multiple sclerosis.  She is scheduled for laparoscopic right shoulder rotator cuff repair/2024    Past Medical History:   Diagnosis Date    Anemia     Esophagitis     Gastroesophageal reflux disease due to diaphragmatic hernia     Goiter     Hypertension     Multiple sclerosis (CMS/HCC)     Personal history of other diseases of the female genital tract 2016    History of ovarian cyst    Personal history of other diseases of the respiratory system 2017    History of acute bronchitis    Personal history of other specified conditions 2016    History of wheezing    Postoperative urinary retention     Shoulder pain, right     Ulcer of esophagus without bleeding     Esophageal erosions       Past Surgical History:   Procedure Laterality Date    APPENDECTOMY  2016    COLONOSCOPY  2016    With polypectomy    ESOPHAGOGASTRODUODENOSCOPY      FOOT SURGERY Left     MR HEAD ANGIO WO IV CONTRAST  2023    MR HEAD ANGIO WO IV CONTRAST [facility]    MR NECK ANGIO WO IV CONTRAST  2023    MR NECK ANGIO WO IV CONTRAST [facility]23 [facility] [place] [facility]    OOPHORECTOMY  2016    OTHER SURGICAL HISTORY  2016    Cervical Conization    SALPINGECTOMY      TEMPOROMANDIBULAR JOINT SURGERY      THYROIDECTOMY, PARTIAL      Substernal     "TOTAL VAGINAL HYSTERECTOMY  03/09/2017       Patient Sexual activity questions deferred to the physician.    Family History   Problem Relation Name Age of Onset    Other (PCI) Mother  80 - 89        Stent    Heart disease Mother      Hypertension Mother      Stomach cancer Father         Allergies   Allergen Reactions    Other Anaphylaxis    Tetracyclines Anaphylaxis, Unknown, Nausea And Vomiting and Nausea Only    Oxycodone Nausea And Vomiting and Nausea Only    Oxycodone-Acetaminophen Unknown and Other     vomiting    Penicillins Other and Unknown     \"I STOP BREATHING\"    Pseudoephedrine Nausea And Vomiting and Nausea Only    Acetaminophen Other    Acetaminophen-Codeine Unknown    Codeine Nausea/vomiting     DIZZINESS    Psyllium Nausea/vomiting     BLOATING    Sulfa (Sulfonamide Antibiotics) Rash       Current Outpatient Medications   Medication Instructions    albuterol 90 mcg/actuation inhaler 2 puffs, inhalation, Every 6 hours PRN    alendronate (FOSAMAX) 70 mg, oral, Every 7 days    aspirin 81 mg, Every 24 hours, AM    atorvastatin (LIPITOR) 40 mg, oral, Daily    buPROPion SR (WELLBUTRIN SR) 100 mg, oral, Daily PRN    cetirizine (ZyrTEC) 10 mg tablet TAKE 1 TABLET BY MOUTH EVERY DAY FOR 15 DAYS    cholecalciferol (Vitamin D3) 25 MCG (1000 UT) tablet 1 tablet, oral, Daily, AM    ciprofloxacin (CIPRO) 500 mg, oral, 2 times daily    diclofenac sodium (Voltaren) 1 % gel gel 1 Application, Topical, 2 times daily PRN    dimethyl fumarate (Tecfidera) 120 mg capsule,delayed release(DR/EC) capsule 1 capsule, Every 12 hours    famotidine (Pepcid AC) 10 mg tablet 1 tablet, oral, 2 times daily PRN    fluticasone (Flonase) 50 mcg/actuation nasal spray 1 spray, Does not apply, Daily PRN    Forteo 20 mcg, subcutaneous, Daily, . REFRIGERATE AND DISCARD PEN 28 DAYS AFTER INITIAL USE.  MID AFTERNOON FOR OSTEOPOROSIS<BR>    gabapentin (NEURONTIN) 300 mg, oral, 4 times daily    ibuprofen 800 mg, oral, 2 times daily    " ipratropium (Atrovent) 42 mcg (0.06 %) nasal spray 2 sprays 4 times a day as needed for rhinitis.    levothyroxine (SYNTHROID, LEVOXYL) 50 mcg, oral, Daily RT    lisinopriL-hydrochlorothiazide 10-12.5 mg tablet 1 tablet, oral, Daily, For 90 days     lisinopril 5 mg, oral, Daily    metoprolol succinate XL (TOPROL-XL) 25 mg, oral, Daily, AM    pantoprazole (PROTONIX) 40 mg, oral, Daily before breakfast    rosuvastatin (CRESTOR) 40 mg, oral, Daily    teriflunomide (AUBAGIO ORAL) 14 mg, oral, Daily, EVERY AFTERNOON FOR TREATMENT OF MS    tiZANidine (Zanaflex) 4 mg tablet 1 tablet, oral, 3 times daily PRN    traMADol (Ultram) 50 mg tablet 1 tablet, oral, 2 times daily PRN, For 30    valACYclovir (VALTREX) 500 mg, oral, Daily    venlafaxine (EFFEXOR) 25 mg, oral, 2 times daily     Review of Systems   All other systems reviewed and are negative.       Physical Exam  Vitals reviewed. Physical exam within normal limits.   Constitutional:       Appearance: Normal appearance.   Cardiovascular:      Rate and Rhythm: Normal rate and regular rhythm.      Pulses: Normal pulses.      Heart sounds: Normal heart sounds.   Pulmonary:      Effort: Pulmonary effort is normal.      Breath sounds: Normal breath sounds.   Abdominal:      General: Bowel sounds are normal.      Palpations: Abdomen is soft.   Musculoskeletal:         General: Normal range of motion.      Cervical back: Normal range of motion.   Skin:     General: Skin is warm and dry.   Neurological:      General: No focal deficit present.      Mental Status: She is alert and oriented to person, place, and time.   Psychiatric:         Mood and Affect: Mood normal.         Behavior: Behavior normal.         Thought Content: Thought content normal.         Judgment: Judgment normal.          PAT AIRWAY:   Airway:     Mallampati::  I    Neck ROM::  Full      Vitals  Heart Rate:  [63-70]   Temp:  [37 °C (98.6 °F)]   Resp:  [16-18]   BP: (130-138)/(66-70)   Height:  [157.5 cm (5'  "2\")]   Weight:  [54.9 kg (121 lb)-56.5 kg (124 lb 9 oz)]   SpO2:  [98 %-100 %]        DASI Risk Score      Flowsheet Row Most Recent Value   DASI SCORE 58.2   METS Score (Will be calculated only when all the questions are answered) 9.9          Caprini DVT Assessment      Flowsheet Row Most Recent Value   DVT Score 8   Current Status Major surgery planned, lasting 2-3 hours, Varicose veins   History Prior major surgery   Age 60-75 years   BMI 30 or less          Modified Frailty Index    No data to display       CHADS2 Stroke Risk  Current as of 34 minutes ago        N/A 3 - 100%: High Risk   2 - 3%: Medium Risk   0 - 2%: Low Risk     Last Change: N/A          This score determines the patient's risk of having a stroke if the patient has atrial fibrillation.        This score is not applicable to this patient. Components are not calculated.          Revised Cardiac Risk Index      Flowsheet Row Most Recent Value   Revised Cardiac Risk Calculator 0          Apfel Simplified Score      Flowsheet Row Most Recent Value   Apfel Simplified Score Calculator 3          Risk Analysis Index Results This Encounter    No data found in the last 1 encounters.       Stop Bang Score      Flowsheet Row Most Recent Value   Do you snore loudly? 0   Do you often feel tired or fatigued after your sleep? 0   Has anyone ever observed you stop breathing in your sleep? 0   Do you have or are you being treated for high blood pressure? 1   Recent BMI (Calculated) 22.8   Is BMI greater than 35 kg/m2? 0=No   Age older than 50 years old? 1=Yes   Is your neck circumference greater than 17 inches (Male) or 16 inches (Female)? 0   Gender - Male 0=No   STOP-BANG Total Score 2            Assessment and Plan:   1.  Partial nontraumatic rotator cuff tear right shoulder   Plan: Arthroscopic right rotator cuff repair 02/13/2024  2.  Hypertension  3.  Hypothyroidism  4.  GERD  5.  Multiple sclerosis  6.  Urinary retention  7.  CHADS2 score 1= 2.8%  8.  " ASA 3

## 2024-02-07 NOTE — TELEPHONE ENCOUNTER
PATIENT STOPPED IN OFFICE TODAY AND HAD QUESTIONS. SHE HAD TOLD YOU NO TO HAVING INJECTION IN LT SHOULDER IN THE OFFICE BUT NOW WANTS ONE. SHE ASKED IF SHE COULD HAVE INJECTION WHILE SHE WAS UNDER ON DAY OF SURGERY. SURGERY IS ON THE RT SHOULDER. SHE ALSO STATES THAT IN THE PAST SHE HAD PROBLEMS WITH ANTHESISA AND BEING ABLE TO URINATE AFTER SURGERY, SHE WANTED YOU TO BE AWARE OF THIS. MAYA SPOKE WITH HER ABOUT THESE 2 QUESTIONS. THEY WANT TO SEE IF IT IS OK IF SHE COMES IN FOR INJECTION IN HER LEFT SHOULDER TOMORROW?

## 2024-02-07 NOTE — CPM/PAT H&P
CPM/PAT Evaluation       Name: Indu Red (Indu Red)  /Age: 1950/73 y.o.     In-Person       Chief Complaint: Rotator cuff tear    HPI 23-year-old right-hand-dominant female complains of right shoulder pain for the past 5 to 10 years.  Patient states pain has gotten progressively worse.  Patient states she injured her right shoulder 20+ years ago.  Patient states she has had injections that are no longer helping.  Patient states she also has some numbness and tingling in her left arm and shoulder.  Patient has a history of postop urinary retention and is very concerned.  She also has history of hypertension, hiatal hernia with GERD, hypothyroidism and multiple sclerosis.  She is scheduled for laparoscopic right shoulder rotator cuff repair/2024    Past Medical History:   Diagnosis Date    Anemia     Esophagitis     Gastroesophageal reflux disease due to diaphragmatic hernia     Goiter     Hypertension     Multiple sclerosis (CMS/HCC)     Personal history of other diseases of the female genital tract 2016    History of ovarian cyst    Personal history of other diseases of the respiratory system 2017    History of acute bronchitis    Personal history of other specified conditions 2016    History of wheezing    Postoperative urinary retention     Shoulder pain, right     Ulcer of esophagus without bleeding     Esophageal erosions       Past Surgical History:   Procedure Laterality Date    APPENDECTOMY  2016    COLONOSCOPY  2016    With polypectomy    ESOPHAGOGASTRODUODENOSCOPY      FOOT SURGERY Left     MR HEAD ANGIO WO IV CONTRAST  2023    MR HEAD ANGIO WO IV CONTRAST 2023 [facility]    MR NECK ANGIO WO IV CONTRAST  2023    MR NECK ANGIO WO IV CONTRAST 2023 [facility]    OOPHORECTOMY  2016    OTHER SURGICAL HISTORY  2016    Cervical Conization    SALPINGECTOMY      TEMPOROMANDIBULAR JOINT SURGERY      THYROIDECTOMY, PARTIAL      Substernal     "TOTAL VAGINAL HYSTERECTOMY  03/09/2017       Patient Sexual activity questions deferred to the physician.    Family History   Problem Relation Name Age of Onset    Other (PCI) Mother  80 - 89        Stent    Heart disease Mother      Hypertension Mother      Stomach cancer Father         Allergies   Allergen Reactions    Other Anaphylaxis    Tetracyclines Anaphylaxis, Unknown, Nausea And Vomiting and Nausea Only    Oxycodone Nausea And Vomiting and Nausea Only    Oxycodone-Acetaminophen Unknown and Other     vomiting    Penicillins Other and Unknown     \"I STOP BREATHING\"    Pseudoephedrine Nausea And Vomiting and Nausea Only    Acetaminophen Other    Acetaminophen-Codeine Unknown    Codeine Nausea/vomiting     DIZZINESS    Psyllium Nausea/vomiting     BLOATING    Sulfa (Sulfonamide Antibiotics) Rash       Current Outpatient Medications   Medication Instructions    albuterol 90 mcg/actuation inhaler 2 puffs, inhalation, Every 6 hours PRN    alendronate (FOSAMAX) 70 mg, oral, Every 7 days    aspirin 81 mg, Every 24 hours, AM    atorvastatin (LIPITOR) 40 mg, oral, Daily    buPROPion SR (WELLBUTRIN SR) 100 mg, oral, Daily PRN    cetirizine (ZyrTEC) 10 mg tablet TAKE 1 TABLET BY MOUTH EVERY DAY FOR 15 DAYS    cholecalciferol (Vitamin D3) 25 MCG (1000 UT) tablet 1 tablet, oral, Daily, AM    ciprofloxacin (CIPRO) 500 mg, oral, 2 times daily    diclofenac sodium (Voltaren) 1 % gel gel 1 Application, Topical, 2 times daily PRN    dimethyl fumarate (Tecfidera) 120 mg capsule,delayed release(DR/EC) capsule 1 capsule, Every 12 hours    famotidine (Pepcid AC) 10 mg tablet 1 tablet, oral, 2 times daily PRN    fluticasone (Flonase) 50 mcg/actuation nasal spray 1 spray, Does not apply, Daily PRN    Forteo 20 mcg, subcutaneous, Daily, . REFRIGERATE AND DISCARD PEN 28 DAYS AFTER INITIAL USE.  MID AFTERNOON FOR OSTEOPOROSIS<BR>    gabapentin (NEURONTIN) 300 mg, oral, 4 times daily    ibuprofen 800 mg, oral, 2 times daily    " ipratropium (Atrovent) 42 mcg (0.06 %) nasal spray 2 sprays 4 times a day as needed for rhinitis.    levothyroxine (SYNTHROID, LEVOXYL) 50 mcg, oral, Daily RT    lisinopriL-hydrochlorothiazide 10-12.5 mg tablet 1 tablet, oral, Daily, For 90 days     lisinopril 5 mg, oral, Daily    metoprolol succinate XL (TOPROL-XL) 25 mg, oral, Daily, AM    pantoprazole (PROTONIX) 40 mg, oral, Daily before breakfast    rosuvastatin (CRESTOR) 40 mg, oral, Daily    teriflunomide (AUBAGIO ORAL) 14 mg, oral, Daily, EVERY AFTERNOON FOR TREATMENT OF MS    tiZANidine (Zanaflex) 4 mg tablet 1 tablet, oral, 3 times daily PRN    traMADol (Ultram) 50 mg tablet 1 tablet, oral, 2 times daily PRN, For 30    valACYclovir (VALTREX) 500 mg, oral, Daily    venlafaxine (EFFEXOR) 25 mg, oral, 2 times daily     Review of Systems   All other systems reviewed and are negative.       Physical Exam  Vitals reviewed. Physical exam within normal limits.   Constitutional:       Appearance: Normal appearance.   Cardiovascular:      Rate and Rhythm: Normal rate and regular rhythm.      Pulses: Normal pulses.      Heart sounds: Normal heart sounds.   Pulmonary:      Effort: Pulmonary effort is normal.      Breath sounds: Normal breath sounds.   Abdominal:      General: Bowel sounds are normal.      Palpations: Abdomen is soft.   Musculoskeletal:         General: Normal range of motion.      Cervical back: Normal range of motion.   Skin:     General: Skin is warm and dry.   Neurological:      General: No focal deficit present.      Mental Status: She is alert and oriented to person, place, and time.   Psychiatric:         Mood and Affect: Mood normal.         Behavior: Behavior normal.         Thought Content: Thought content normal.         Judgment: Judgment normal.          PAT AIRWAY:   Airway:     Mallampati::  I    Neck ROM::  Full      Vitals  Heart Rate:  [63-70]   Temp:  [37 °C (98.6 °F)]   Resp:  [16-18]   BP: (130-138)/(66-70)   Height:  [157.5 cm (5'  "2\")]   Weight:  [54.9 kg (121 lb)-56.5 kg (124 lb 9 oz)]   SpO2:  [98 %-100 %]        DASI Risk Score      Flowsheet Row Most Recent Value   DASI SCORE 58.2   METS Score (Will be calculated only when all the questions are answered) 9.9          Caprini DVT Assessment      Flowsheet Row Most Recent Value   DVT Score 8   Current Status Major surgery planned, lasting 2-3 hours, Varicose veins   History Prior major surgery   Age 60-75 years   BMI 30 or less          Modified Frailty Index    No data to display       CHADS2 Stroke Risk  Current as of 34 minutes ago        N/A 3 - 100%: High Risk   2 - 3%: Medium Risk   0 - 2%: Low Risk     Last Change: N/A          This score determines the patient's risk of having a stroke if the patient has atrial fibrillation.        This score is not applicable to this patient. Components are not calculated.          Revised Cardiac Risk Index      Flowsheet Row Most Recent Value   Revised Cardiac Risk Calculator 0          Apfel Simplified Score      Flowsheet Row Most Recent Value   Apfel Simplified Score Calculator 3          Risk Analysis Index Results This Encounter    No data found in the last 1 encounters.       Stop Bang Score      Flowsheet Row Most Recent Value   Do you snore loudly? 0   Do you often feel tired or fatigued after your sleep? 0   Has anyone ever observed you stop breathing in your sleep? 0   Do you have or are you being treated for high blood pressure? 1   Recent BMI (Calculated) 22.8   Is BMI greater than 35 kg/m2? 0=No   Age older than 50 years old? 1=Yes   Is your neck circumference greater than 17 inches (Male) or 16 inches (Female)? 0   Gender - Male 0=No   STOP-BANG Total Score 2            Assessment and Plan:   1.  Partial nontraumatic rotator cuff tear right shoulder   Plan: Arthroscopic right rotator cuff repair 02/13/2024  2.  Hypertension  3.  Hypothyroidism  4.  GERD  5.  Multiple sclerosis  6.  Urinary retention  7.  CHADS2 score 1= 2.8%  8.  " ASA 3

## 2024-02-07 NOTE — PREPROCEDURE INSTRUCTIONS
Medication List            Accurate as of February 7, 2024 11:21 AM. Always use your most recent med list.                albuterol 90 mcg/actuation inhaler  Notes to patient: BRING TO HOSPITAL DAY OF SURGERY     alendronate 70 mg tablet  Commonly known as: Fosamax  Notes to patient: NOT TAKING     aspirin 81 mg EC tablet  Medication Adjustments for Surgery: Stop 7 days before surgery     atorvastatin 40 mg tablet  Commonly known as: Lipitor  TAKE 1 TABLET BY MOUTH EVERY DAY FOR 90 DAYS  Medication Adjustments for Surgery: Take morning of surgery with sip of water, no other fluids     AUBAGIO ORAL     buPROPion  mg 12 hr tablet  Commonly known as: Wellbutrin SR  Notes to patient: MAY TAKE MORNING OF SURGERY IF NEEDED     cetirizine 10 mg tablet  Commonly known as: ZyrTEC  Notes to patient: NOT TAKING     ciprofloxacin 500 mg tablet  Commonly known as: Cipro  Take 1 tablet (500 mg) by mouth 2 times a day for 5 days.  Notes to patient: NOT TAKING     diclofenac sodium 1 % gel  Commonly known as: Voltaren  Apply 1 Application topically 2 times a day as needed (pain).     dimethyl fumarate 120 mg capsule,delayed release(DR/EC) capsule  Commonly known as: Tecfidera  Notes to patient: NOT TAKING     fluticasone 50 mcg/actuation nasal spray  Commonly known as: Flonase     Forteo injection  Generic drug: teriparatide     gabapentin 300 mg capsule  Commonly known as: Neurontin  TAKE 1 CAPSULE BY MOUTH FOUR TIMES A DAY  Medication Adjustments for Surgery: Take morning of surgery with sip of water, no other fluids     ibuprofen 800 mg tablet  TAKE 1 TABLET (800 MG) BY MOUTH 2 TIMES A DAY.  Medication Adjustments for Surgery: Stop 7 days before surgery     ipratropium 42 mcg (0.06 %) nasal spray  Commonly known as: Atrovent     levothyroxine 50 mcg tablet  Commonly known as: Synthroid, Levoxyl  Take 1 tablet (50 mcg) by mouth once daily.  Medication Adjustments for Surgery: Take morning of surgery with sip of water, no  other fluids     lisinopril 5 mg tablet  Notes to patient: NOT TAKING     lisinopriL-hydrochlorothiazide 10-12.5 mg tablet  DO NOT TAKE MORNING OF SURGERY   metoprolol succinate XL 25 mg 24 hr tablet  Commonly known as: Toprol-XL  Medication Adjustments for Surgery: Take morning of surgery with sip of water, no other fluids     pantoprazole 40 mg EC tablet  Commonly known as: ProtoNix  Medication Adjustments for Surgery: Take morning of surgery with sip of water, no other fluids     Pepcid AC 10 mg tablet  Generic drug: famotidine     rosuvastatin 40 mg tablet  Commonly known as: Crestor  Take 1 tablet (40 mg) by mouth once daily.     tiZANidine 4 mg tablet  Commonly known as: Zanaflex     traMADol 50 mg tablet  Commonly known as: Ultram  Notes to patient: NOT TAKING     valACYclovir 500 mg tablet  Commonly known as: Valtrex  NOT TAKING   venlafaxine 25 mg tablet  Commonly known as: Effexor  Take 1 tablet (25 mg) by mouth 2 times a day.  Medication Adjustments for Surgery: Take morning of surgery with sip of water, no other fluids     Vitamin D3 25 MCG (1000 UT) tablet  Generic drug: cholecalciferol  Medication Adjustments for Surgery: Stop 7 days before surgery                              NPO Instructions:    Do not eat any food after midnight the night before your surgery/procedure.    Additional Instructions:     Day of Surgery:  Review your medication instructions, take indicated medications  Wear  comfortable loose fitting clothing  All jewelry and valuables should be left at home    PAT DISCHARGE INSTRUCTIONS    Please call the Same Day Surgery (SDS) Department of the hospital where your procedure will be performed after 2:00 PM the day before your surgery. If you are scheduled on a Monday, or a Tuesday following a Monday holiday, you will need to call on the last business day prior to your surgery.    [facility]  [address],  [address]  [phone]    [facility]  [address] [place] [address] [phone]    [facility]  [address]  [place] [id number] [phone]    Please let your surgeon know if:      You develop any open sores, shingles, burning or painful urination as these may increase your risk of an infection.   You no longer wish to have the surgery.   Any other personal circumstances change that may lead to the need to cancel or defer this surgery-such as being sick or getting admitted to any hospital within one week of your planned procedure.    Your contact details change, such as a change of address or phone number.    Starting now:     Please DO NOT drink alcohol or smoke for 24 hours before surgery. It is well known that quitting smoking can make a huge difference to your health and recovery from surgery. The longer you abstain from smoking, the better your chances of a healthy recovery. If you need help with quitting, call 5-430-QUIT-NOW to be connected to a trained counselor who will discuss the best methods to help you quit.     Before your surgery:    Please stop all supplements 7 days prior to surgery. Or as directed by your surgeon.   Please stop taking NSAID pain medicine such as Advil and Motrin 7 days before surgery.    If you develop any fever, cough, cold, rashes, cuts, scratches, scrapes, urinary symptoms or infection anywhere on your body (including teeth and gums) prior to surgery, please call your surgeon’s office as soon as possible. This may require treatment to reduce the chance of cancellation on the day of surgery.    The day before your surgery:   DIET- Do not eat any food after MIDNIGHT. May have 10 ounces of CLEAR LIQUIDS until TWO HOURS before your arrival time. This includes water, black tea or coffee (no milk ir cream), apple juice and electrolyte drinks (Gatorade). May chew gum until TWO hours before your surgery  time.   Get a good night’s rest.  Use the special soap for bathing if you have been instructed to use one.    Scheduled surgery times may change and you will be notified if this occurs - please check your personal voicemail for any updates.     On the morning of surgery:   Wear comfortable, loose fitting clothes which open in the front. Please do not wear moisturizers, creams, lotions, makeup or perfume.    Please bring with you to surgery:   Photo ID and insurance card   Current list of medicines and allergies   Pacemaker/ Defibrillator/Heart stent cards   CPAP machine and mask    Slings/ splints/ crutches   A copy of your complete advanced directive/DHPOA.    Please do NOT bring with you to surgery:   All jewelry and valuables should be left at home.   Prosthetic devices such as contact lenses, hearing aids, dentures, eyelash extensions, hairpins and body piercings must be removed prior to going in to the surgical suite.    After outpatient surgery:   A responsible adult MUST accompany you at the time of discharge and stay with you for 24 hours after your surgery. You may NOT drive yourself home after surgery.    Do not drive, operate machinery, make critical decisions or do activities that require co-ordination or balance until after a night’s sleep.   Do not drink alcoholic beverages for 24 hours.   Instructions for resuming your medications will be provided by your surgeon.    CALL YOUR DOCTOR AFTER SURGERY IF YOU HAVE:     Chills and/or a fever of 101° F or higher.    Redness, swelling, pus or drainage from your surgical wound or a bad smell from the wound.    Lightheadedness, fainting or confusion.    Persistent vomiting (throwing up) and are not able to eat or drink for 12 hours.    Three or more loose, watery bowel movements in 24 hours (diarrhea).   Difficulty or pain while urinating( after non-urological surgery)    Pain and swelling in your legs, especially if it is only on one side.    Difficulty  breathing or are breathing faster than normal.    Any new concerning symptoms.

## 2024-02-07 NOTE — PREPROCEDURE INSTRUCTIONS
Medication List            Accurate as of February 7, 2024 11:10 AM. Always use your most recent med list.                albuterol 90 mcg/actuation inhaler  Notes to patient: BRING TO HOSPITAL DAY OF SURGERY     alendronate 70 mg tablet  Commonly known as: Fosamax  Notes to patient: NOT TAKING     aspirin 81 mg EC tablet  Medication Adjustments for Surgery: Stop 7 days before surgery     atorvastatin 40 mg tablet  Commonly known as: Lipitor  TAKE 1 TABLET BY MOUTH EVERY DAY FOR 90 DAYS  Medication Adjustments for Surgery: Take morning of surgery with sip of water, no other fluids     AUBAGIO ORAL     buPROPion  mg 12 hr tablet  Commonly known as: Wellbutrin SR  Notes to patient: MAY TAKE MORNING OF SURGERY IF NEEDED     cetirizine 10 mg tablet  Commonly known as: ZyrTEC  Notes to patient: DO NOT TAKE MORNING OF SURGERY     ciprofloxacin 500 mg tablet  Commonly known as: Cipro  Take 1 tablet (500 mg) by mouth 2 times a day for 5 days.  Notes to patient: NOT TAKING     diclofenac sodium 1 % gel  Commonly known as: Voltaren  Apply 1 Application topically 2 times a day as needed (pain).     dimethyl fumarate 120 mg capsule,delayed release(DR/EC) capsule  Commonly known as: Tecfidera  Notes to patient: NOT TAKING     fluticasone 50 mcg/actuation nasal spray  Commonly known as: Flonase     Forteo injection  Generic drug: teriparatide     gabapentin 300 mg capsule  Commonly known as: Neurontin  TAKE 1 CAPSULE BY MOUTH FOUR TIMES A DAY  Medication Adjustments for Surgery: Take morning of surgery with sip of water, no other fluids     ibuprofen 800 mg tablet  TAKE 1 TABLET (800 MG) BY MOUTH 2 TIMES A DAY.  Medication Adjustments for Surgery: Stop 7 days before surgery     ipratropium 42 mcg (0.06 %) nasal spray  Commonly known as: Atrovent     levothyroxine 50 mcg tablet  Commonly known as: Synthroid, Levoxyl  Take 1 tablet (50 mcg) by mouth once daily.  Medication Adjustments for Surgery: Take morning of surgery  with sip of water, no other fluids     lisinopril 5 mg tablet  Notes to patient: NOT TAKING     lisinopriL-hydrochlorothiazide 10-12.5 mg tablet     metoprolol succinate XL 25 mg 24 hr tablet  Commonly known as: Toprol-XL  Medication Adjustments for Surgery: Take morning of surgery with sip of water, no other fluids     pantoprazole 40 mg EC tablet  Commonly known as: ProtoNix  Medication Adjustments for Surgery: Take morning of surgery with sip of water, no other fluids     Pepcid AC 10 mg tablet  Generic drug: famotidine     rosuvastatin 40 mg tablet  Commonly known as: Crestor  Take 1 tablet (40 mg) by mouth once daily.     tiZANidine 4 mg tablet  Commonly known as: Zanaflex     traMADol 50 mg tablet  Commonly known as: Ultram  Notes to patient: NOT TAKING     valACYclovir 500 mg tablet  Commonly known as: Valtrex     venlafaxine 25 mg tablet  Commonly known as: Effexor  Take 1 tablet (25 mg) by mouth 2 times a day.  Medication Adjustments for Surgery: Take morning of surgery with sip of water, no other fluids     Vitamin D3 25 MCG (1000 UT) tablet  Generic drug: cholecalciferol  Medication Adjustments for Surgery: Stop 7 days before surgery                              NPO Instructions:    Do not eat any food after midnight the night before your surgery/procedure.    Additional Instructions:     Day of Surgery:  Review your medication instructions, take indicated medications  Wear  comfortable loose fitting clothing  All jewelry and valuables should be left at home    PAT DISCHARGE INSTRUCTIONS    Please call the Same Day Surgery (SDS) Department of the hospital where your procedure will be performed after 2:00 PM the day before your surgery. If you are scheduled on a Monday, or a Tuesday following a Monday holiday, you will need to call on the last business day prior to your surgery.    [facility]  [address], [address] [phone]    [facility]  [facility]  [address] [place] [address] [phone]    [facility]  [address]  [place] [id number]  [phone]    Please let your surgeon know if:      You develop any open sores, shingles, burning or painful urination as these may increase your risk of an infection.   You no longer wish to have the surgery.   Any other personal circumstances change that may lead to the need to cancel or defer this surgery-such as being sick or getting admitted to any hospital within one week of your planned procedure.    Your contact details change, such as a change of address or phone number.    Starting now:     Please DO NOT drink alcohol or smoke for 24 hours before surgery. It is well known that quitting smoking can make a huge difference to your health and recovery from surgery. The longer you abstain from smoking, the better your chances of a healthy recovery. If you need help with quitting, call 3-709-QUIT-NOW to be connected to a trained counselor who will discuss the best methods to help you quit.     Before your surgery:    Please stop all supplements 7 days prior to surgery. Or as directed by your surgeon.   Please stop taking NSAID pain medicine such as Advil and Motrin 7 days before surgery.    If you develop any fever, cough, cold, rashes, cuts, scratches, scrapes, urinary symptoms or infection anywhere on your body (including teeth and gums) prior to surgery, please call your surgeon’s office as soon as possible. This may require treatment to reduce the chance of cancellation on the day of surgery.    The day before your surgery:   DIET- Do not eat any food after MIDNIGHT. May have 10 ounces of CLEAR LIQUIDS until TWO HOURS before your arrival time. This includes water, black tea or coffee (no milk ir cream), apple juice and electrolyte drinks (Gatorade). May chew gum until TWO hours before your surgery time.   Get a good night’s rest.  Use the  special soap for bathing if you have been instructed to use one.    Scheduled surgery times may change and you will be notified if this occurs - please check your personal voicemail for any updates.     On the morning of surgery:   Wear comfortable, loose fitting clothes which open in the front. Please do not wear moisturizers, creams, lotions, makeup or perfume.    Please bring with you to surgery:   Photo ID and insurance card   Current list of medicines and allergies   Pacemaker/ Defibrillator/Heart stent cards   CPAP machine and mask    Slings/ splints/ crutches   A copy of your complete advanced directive/DHPOA.    Please do NOT bring with you to surgery:   All jewelry and valuables should be left at home.   Prosthetic devices such as contact lenses, hearing aids, dentures, eyelash extensions, hairpins and body piercings must be removed prior to going in to the surgical suite.    After outpatient surgery:   A responsible adult MUST accompany you at the time of discharge and stay with you for 24 hours after your surgery. You may NOT drive yourself home after surgery.    Do not drive, operate machinery, make critical decisions or do activities that require co-ordination or balance until after a night’s sleep.   Do not drink alcoholic beverages for 24 hours.   Instructions for resuming your medications will be provided by your surgeon.    CALL YOUR DOCTOR AFTER SURGERY IF YOU HAVE:     Chills and/or a fever of 101° F or higher.    Redness, swelling, pus or drainage from your surgical wound or a bad smell from the wound.    Lightheadedness, fainting or confusion.    Persistent vomiting (throwing up) and are not able to eat or drink for 12 hours.    Three or more loose, watery bowel movements in 24 hours (diarrhea).   Difficulty or pain while urinating( after non-urological surgery)    Pain and swelling in your legs, especially if it is only on one side.    Difficulty breathing or are breathing faster than normal.     Any new concerning symptoms.

## 2024-02-08 ENCOUNTER — TELEPHONE (OUTPATIENT)
Dept: PRIMARY CARE | Facility: CLINIC | Age: 74
End: 2024-02-08

## 2024-02-08 ENCOUNTER — OFFICE VISIT (OUTPATIENT)
Dept: ORTHOPEDIC SURGERY | Facility: CLINIC | Age: 74
End: 2024-02-08
Payer: MEDICARE

## 2024-02-08 VITALS — BODY MASS INDEX: 22.78 KG/M2 | WEIGHT: 124.56 LBS

## 2024-02-08 DIAGNOSIS — M75.111 PARTIAL NONTRAUMATIC TEAR OF RIGHT ROTATOR CUFF: ICD-10-CM

## 2024-02-08 DIAGNOSIS — M25.512 LEFT SHOULDER PAIN, UNSPECIFIED CHRONICITY: Primary | ICD-10-CM

## 2024-02-08 DIAGNOSIS — M75.52 BURSITIS OF LEFT SHOULDER: ICD-10-CM

## 2024-02-08 PROCEDURE — 1160F RVW MEDS BY RX/DR IN RCRD: CPT | Performed by: PHYSICIAN ASSISTANT

## 2024-02-08 PROCEDURE — 99213 OFFICE O/P EST LOW 20 MIN: CPT | Performed by: PHYSICIAN ASSISTANT

## 2024-02-08 PROCEDURE — 1126F AMNT PAIN NOTED NONE PRSNT: CPT | Performed by: PHYSICIAN ASSISTANT

## 2024-02-08 PROCEDURE — 2500000005 HC RX 250 GENERAL PHARMACY W/O HCPCS: Performed by: PHYSICIAN ASSISTANT

## 2024-02-08 PROCEDURE — 20610 DRAIN/INJ JOINT/BURSA W/O US: CPT | Performed by: PHYSICIAN ASSISTANT

## 2024-02-08 PROCEDURE — 1159F MED LIST DOCD IN RCRD: CPT | Performed by: PHYSICIAN ASSISTANT

## 2024-02-08 PROCEDURE — 2500000004 HC RX 250 GENERAL PHARMACY W/ HCPCS (ALT 636 FOR OP/ED): Performed by: PHYSICIAN ASSISTANT

## 2024-02-08 RX ORDER — LIDOCAINE HYDROCHLORIDE 10 MG/ML
1 INJECTION INFILTRATION; PERINEURAL
Status: COMPLETED | OUTPATIENT
Start: 2024-02-08 | End: 2024-02-08

## 2024-02-08 RX ORDER — TRIAMCINOLONE ACETONIDE 40 MG/ML
10 INJECTION, SUSPENSION INTRA-ARTICULAR; INTRAMUSCULAR
Status: COMPLETED | OUTPATIENT
Start: 2024-02-08 | End: 2024-02-08

## 2024-02-08 RX ADMIN — LIDOCAINE HYDROCHLORIDE 1 ML: 10 INJECTION, SOLUTION INFILTRATION; PERINEURAL at 11:36

## 2024-02-08 RX ADMIN — TRIAMCINOLONE ACETONIDE 10 MG: 40 INJECTION, SUSPENSION INTRA-ARTICULAR; INTRAMUSCULAR at 11:36

## 2024-02-08 ASSESSMENT — PAIN - FUNCTIONAL ASSESSMENT: PAIN_FUNCTIONAL_ASSESSMENT: 0-10

## 2024-02-08 ASSESSMENT — ENCOUNTER SYMPTOMS
OCCASIONAL FEELINGS OF UNSTEADINESS: 0
DEPRESSION: 0
LOSS OF SENSATION IN FEET: 0

## 2024-02-08 ASSESSMENT — LIFESTYLE VARIABLES: TOTAL SCORE: 0

## 2024-02-08 ASSESSMENT — PATIENT HEALTH QUESTIONNAIRE - PHQ9
1. LITTLE INTEREST OR PLEASURE IN DOING THINGS: NOT AT ALL
SUM OF ALL RESPONSES TO PHQ9 QUESTIONS 1 AND 2: 0
2. FEELING DOWN, DEPRESSED OR HOPELESS: NOT AT ALL

## 2024-02-08 ASSESSMENT — PAIN SCALES - GENERAL: PAINLEVEL_OUTOF10: 8

## 2024-02-08 ASSESSMENT — PAIN DESCRIPTION - DESCRIPTORS: DESCRIPTORS: ACHING

## 2024-02-08 NOTE — RESULT ENCOUNTER NOTE
CKD stage 3, stable: I recommend eating a healthy low sodium diet, avoid medications which can harm the kidneys such as NSAIDs (ibuprofen, Aleve, Motrin, diclofenac, Advil) and drinking adequate quantities of water to remain hydrated. It is also important to keep blood pressures and blood sugars well controlled to avoid further decline in kidney function. We will continue to monitor.    Anemia of Ckd. Monitor.    Labs were otherwise unremarkable.

## 2024-02-08 NOTE — H&P (VIEW-ONLY)
Subjective      Chief Complaint   Patient presents with    Left Shoulder - Pain        No surgery found     HPI  This 73 year young woman presents today with left shoulder pain (8/10). She states that the left shoulder pain has been present for years. She denies trauma or injury to the left shoulder. She states that the left shoulder pain (8/10) is worse with and aggravated by reaching and lifting. She states that this left shoulder pain is disabling and she presents today to discuss further options. She states that she has tried tylenol and ibuprofen with no relief. She denies shortness of breath, dizziness and chest pain on examination today. She is scheduled for right rotator cuff repair with Dr. Gaffney on 2-.    CARDIOLOGY:   Negative for chest pain, shortness of breath.   RESPIRATORY:   Negative for chest pain, shortness of breath.   MUSCULOSKELETAL:   See HPI for details.   NEUROLOGY:   Negative for tingling, numbness, weakness.    Objective    There were no vitals filed for this visit.    Physical Exam  GENERAL:          General Appearance:  This is a pleasant patient with appropriate affect, in no acute distress.   DERMATOLOGY:          Skin: skin at the neck, upper and lower back, and trunk is intact. There is no evidence of skin rash, skin breakdown or ulceration, or atrophic skin change.   EXTREMITIES:          Vascular:  Right, left hands and feet are warm with good color and pulses. Right and left calf and thigh are nontender and nonswollen.   NEUROLOGICAL:          Orientation:  Patient is alert and oriented to person, place, time and situation. Right and left upper and lower extremity motor and sensory examinations are intact.      MUSCULOSKELETAL: Neck: Nontender. No pain with range of motion. Back: No tenderness. Straight leg test negative bilaterally. Right shoulder: There is tenderness anteriorly and laterally. Active abduction and active flexion are 0-90 degrees but with pain and  guarding. There is pain with and limitation of active and passive internal and external rotation. Comparments are soft. Neurovascular is intact left shoulder: There is tenderness anteriorly and laterally. Active abduction and active flexion are 0-125 degrees but with pain and guarding. There is pain with and limitation of active and passive internal and external rotation. Neurovascular is intact. AP and lateral x-rays of the right and left shoulder done and read in the office on 4- show mild osteoarthritis with loss of joint surface cartilage and sclerosis. These are reviewed with the patient in the office today.    MRI of the right shoulder:  IMPRESSION:  1. There is high-grade, partial-thickness, bursal tearing of the  anterior and central supraspinatus tendon fibers, measuring  approximately 6 mm in AP dimension. There is moderate supraspinatus  and infraspinatus tendinosis. No muscle atrophy.  2. Moderate degenerative changes of the acromioclavicular joint are  noted, there also mild degenerative changes of the glenohumeral joint.  3. There is moderate intra-articular biceps tendinosis with an area  of segmental interstitial tearing not excluded immediately proximal  to the intertubercular sulcus.  4. Moderate volume subacromial subdeltoid bursal fluid.     Patient ID: Indu Red is a 73 y.o. female.    L Inj/Asp: L subacromial bursa on 2/8/2024 11:36 AM  Indications: pain  Details: 22 G needle, lateral approach  Medications: 1 mL lidocaine 10 mg/mL (1 %); 10 mg triamcinolone acetonide 40 mg/mL  Outcome: tolerated well, no immediate complications  Procedure, treatment alternatives, risks and benefits explained, specific risks discussed. Immediately prior to procedure a time out was called to verify the correct patient, procedure, equipment, support staff and site/side marked as required. Patient was prepped and draped in the usual sterile fashion.          Indu was seen today for pain.  Diagnoses and  all orders for this visit:  Left shoulder pain, unspecified chronicity (Primary)  Bursitis of left shoulder  Partial nontraumatic tear of right rotator cuff  Options are discussed with the patient in detail. The patient is instructed regarding activity modification, ice, provider directed at home gentle strengthening and ROM exercises, and the appropriate use of Tylenol as needed for pain with its potential adverse reactions and side effects. The patient understands. The patient states that despite all the treatment listed above that this left shoulder pain is debilitating and  requests a discussion of further options. Cortisone injection to the left shoulder is discussed in the office today. This is done in the office today. See procedures below. Follow up with Dr. Gaffney next week for OR or sooner as needed, Please note that this report has been produced using speech recognition software.  It may contain errors related to grammar, punctuation or spelling.  Electronically signed, but not reviewed.    Gill Etienne PA-C

## 2024-02-08 NOTE — RESULT ENCOUNTER NOTE
Please inform patient urine did not grow any bacteria. Her UTI has been completely treated with the course of antibiotics she completed.  Symptoms are likely related from bladder irritation from previous bladder infection.  Please drink adequate water 64 ounces per day allow time for inflammation to heal.

## 2024-02-13 ENCOUNTER — ANESTHESIA EVENT (OUTPATIENT)
Dept: OPERATING ROOM | Facility: HOSPITAL | Age: 74
End: 2024-02-13
Payer: MEDICARE

## 2024-02-13 ENCOUNTER — ANESTHESIA (OUTPATIENT)
Dept: OPERATING ROOM | Facility: HOSPITAL | Age: 74
End: 2024-02-13
Payer: MEDICARE

## 2024-02-13 ENCOUNTER — HOSPITAL ENCOUNTER (OUTPATIENT)
Facility: HOSPITAL | Age: 74
Setting detail: OUTPATIENT SURGERY
Discharge: HOME | End: 2024-02-13
Attending: ORTHOPAEDIC SURGERY | Admitting: ORTHOPAEDIC SURGERY
Payer: MEDICARE

## 2024-02-13 ENCOUNTER — PHARMACY VISIT (OUTPATIENT)
Dept: PHARMACY | Facility: CLINIC | Age: 74
End: 2024-02-13
Payer: MEDICARE

## 2024-02-13 VITALS
DIASTOLIC BLOOD PRESSURE: 65 MMHG | HEART RATE: 60 BPM | RESPIRATION RATE: 16 BRPM | TEMPERATURE: 98.8 F | SYSTOLIC BLOOD PRESSURE: 135 MMHG | OXYGEN SATURATION: 99 %

## 2024-02-13 DIAGNOSIS — M75.01 ADHESIVE CAPSULITIS OF RIGHT SHOULDER: Primary | ICD-10-CM

## 2024-02-13 DIAGNOSIS — N30.01 ACUTE CYSTITIS WITH HEMATURIA: ICD-10-CM

## 2024-02-13 DIAGNOSIS — M75.111 PARTIAL NONTRAUMATIC TEAR OF RIGHT ROTATOR CUFF: ICD-10-CM

## 2024-02-13 PROCEDURE — 2720000007 HC OR 272 NO HCPCS: Performed by: ORTHOPAEDIC SURGERY

## 2024-02-13 PROCEDURE — RXMED WILLOW AMBULATORY MEDICATION CHARGE

## 2024-02-13 PROCEDURE — 7100000009 HC PHASE TWO TIME - INITIAL BASE CHARGE: Performed by: ORTHOPAEDIC SURGERY

## 2024-02-13 PROCEDURE — 99100 ANES PT EXTEME AGE<1 YR&>70: CPT | Performed by: ANESTHESIOLOGY

## 2024-02-13 PROCEDURE — 2500000004 HC RX 250 GENERAL PHARMACY W/ HCPCS (ALT 636 FOR OP/ED): Performed by: NURSE ANESTHETIST, CERTIFIED REGISTERED

## 2024-02-13 PROCEDURE — 2500000004 HC RX 250 GENERAL PHARMACY W/ HCPCS (ALT 636 FOR OP/ED): Performed by: ORTHOPAEDIC SURGERY

## 2024-02-13 PROCEDURE — 64415 NJX AA&/STRD BRCH PLXS IMG: CPT | Performed by: ANESTHESIOLOGY

## 2024-02-13 PROCEDURE — 3700000001 HC GENERAL ANESTHESIA TIME - INITIAL BASE CHARGE: Performed by: ORTHOPAEDIC SURGERY

## 2024-02-13 PROCEDURE — 3600000004 HC OR TIME - INITIAL BASE CHARGE - PROCEDURE LEVEL FOUR: Performed by: ORTHOPAEDIC SURGERY

## 2024-02-13 PROCEDURE — 2500000005 HC RX 250 GENERAL PHARMACY W/O HCPCS: Performed by: NURSE ANESTHETIST, CERTIFIED REGISTERED

## 2024-02-13 PROCEDURE — 3600000009 HC OR TIME - EACH INCREMENTAL 1 MINUTE - PROCEDURE LEVEL FOUR: Performed by: ORTHOPAEDIC SURGERY

## 2024-02-13 PROCEDURE — 7100000010 HC PHASE TWO TIME - EACH INCREMENTAL 1 MINUTE: Performed by: ORTHOPAEDIC SURGERY

## 2024-02-13 PROCEDURE — 23412 REPAIR ROTATOR CUFF CHRONIC: CPT | Performed by: ORTHOPAEDIC SURGERY

## 2024-02-13 PROCEDURE — A23412 PR REPAIR ROTATOR CUFF,CHRONIC: Performed by: NURSE ANESTHETIST, CERTIFIED REGISTERED

## 2024-02-13 PROCEDURE — A23412 PR REPAIR ROTATOR CUFF,CHRONIC: Performed by: ANESTHESIOLOGY

## 2024-02-13 PROCEDURE — 3700000002 HC GENERAL ANESTHESIA TIME - EACH INCREMENTAL 1 MINUTE: Performed by: ORTHOPAEDIC SURGERY

## 2024-02-13 PROCEDURE — 2500000004 HC RX 250 GENERAL PHARMACY W/ HCPCS (ALT 636 FOR OP/ED): Performed by: ANESTHESIOLOGY

## 2024-02-13 PROCEDURE — A4649 SURGICAL SUPPLIES: HCPCS | Performed by: ORTHOPAEDIC SURGERY

## 2024-02-13 PROCEDURE — 7100000002 HC RECOVERY ROOM TIME - EACH INCREMENTAL 1 MINUTE: Performed by: ORTHOPAEDIC SURGERY

## 2024-02-13 PROCEDURE — 7100000001 HC RECOVERY ROOM TIME - INITIAL BASE CHARGE: Performed by: ORTHOPAEDIC SURGERY

## 2024-02-13 RX ORDER — FENTANYL CITRATE 50 UG/ML
50 INJECTION, SOLUTION INTRAMUSCULAR; INTRAVENOUS EVERY 5 MIN PRN
Status: DISCONTINUED | OUTPATIENT
Start: 2024-02-13 | End: 2024-02-13 | Stop reason: HOSPADM

## 2024-02-13 RX ORDER — ONDANSETRON HYDROCHLORIDE 2 MG/ML
INJECTION, SOLUTION INTRAVENOUS AS NEEDED
Status: DISCONTINUED | OUTPATIENT
Start: 2024-02-13 | End: 2024-02-13

## 2024-02-13 RX ORDER — CIPROFLOXACIN 500 MG/1
500 TABLET ORAL 2 TIMES DAILY
Qty: 6 TABLET | Refills: 0 | Status: SHIPPED | OUTPATIENT
Start: 2024-02-13 | End: 2024-02-16

## 2024-02-13 RX ORDER — SODIUM CHLORIDE 9 MG/ML
100 INJECTION, SOLUTION INTRAVENOUS CONTINUOUS
Status: DISCONTINUED | OUTPATIENT
Start: 2024-02-13 | End: 2024-02-13 | Stop reason: HOSPADM

## 2024-02-13 RX ORDER — FENTANYL CITRATE 50 UG/ML
25 INJECTION, SOLUTION INTRAMUSCULAR; INTRAVENOUS EVERY 5 MIN PRN
Status: DISCONTINUED | OUTPATIENT
Start: 2024-02-13 | End: 2024-02-13 | Stop reason: HOSPADM

## 2024-02-13 RX ORDER — SODIUM CHLORIDE, SODIUM LACTATE, POTASSIUM CHLORIDE, CALCIUM CHLORIDE 600; 310; 30; 20 MG/100ML; MG/100ML; MG/100ML; MG/100ML
100 INJECTION, SOLUTION INTRAVENOUS CONTINUOUS
Status: DISCONTINUED | OUTPATIENT
Start: 2024-02-13 | End: 2024-02-13 | Stop reason: HOSPADM

## 2024-02-13 RX ORDER — OXYCODONE AND ACETAMINOPHEN 5; 325 MG/1; MG/1
1 TABLET ORAL EVERY 6 HOURS PRN
Qty: 28 TABLET | Refills: 0 | Status: SHIPPED | OUTPATIENT
Start: 2024-02-13 | End: 2024-02-20

## 2024-02-13 RX ORDER — FENTANYL CITRATE 50 UG/ML
50 INJECTION, SOLUTION INTRAMUSCULAR; INTRAVENOUS ONCE
Status: COMPLETED | OUTPATIENT
Start: 2024-02-13 | End: 2024-02-13

## 2024-02-13 RX ORDER — IBUPROFEN 400 MG/1
400 TABLET ORAL EVERY 6 HOURS PRN
Status: DISCONTINUED | OUTPATIENT
Start: 2024-02-13 | End: 2024-02-13 | Stop reason: HOSPADM

## 2024-02-13 RX ORDER — VANCOMYCIN 1 G/200ML
1000 INJECTION, SOLUTION INTRAVENOUS ONCE
Status: COMPLETED | OUTPATIENT
Start: 2024-02-13 | End: 2024-02-13

## 2024-02-13 RX ORDER — DEXAMETHASONE SODIUM PHOSPHATE 100 MG/10ML
INJECTION INTRAMUSCULAR; INTRAVENOUS AS NEEDED
Status: DISCONTINUED | OUTPATIENT
Start: 2024-02-13 | End: 2024-02-13

## 2024-02-13 RX ORDER — LABETALOL HYDROCHLORIDE 5 MG/ML
5 INJECTION, SOLUTION INTRAVENOUS ONCE AS NEEDED
Status: DISCONTINUED | OUTPATIENT
Start: 2024-02-13 | End: 2024-02-13 | Stop reason: HOSPADM

## 2024-02-13 RX ORDER — SODIUM CHLORIDE, SODIUM LACTATE, POTASSIUM CHLORIDE, CALCIUM CHLORIDE 600; 310; 30; 20 MG/100ML; MG/100ML; MG/100ML; MG/100ML
INJECTION, SOLUTION INTRAVENOUS CONTINUOUS PRN
Status: DISCONTINUED | OUTPATIENT
Start: 2024-02-13 | End: 2024-02-13

## 2024-02-13 RX ORDER — TRAMADOL HYDROCHLORIDE 50 MG/1
50 TABLET ORAL EVERY 6 HOURS PRN
Qty: 28 TABLET | Refills: 0 | Status: SHIPPED | OUTPATIENT
Start: 2024-02-13 | End: 2024-02-20

## 2024-02-13 RX ORDER — LIDOCAINE HYDROCHLORIDE 20 MG/ML
INJECTION, SOLUTION INFILTRATION; PERINEURAL AS NEEDED
Status: DISCONTINUED | OUTPATIENT
Start: 2024-02-13 | End: 2024-02-13

## 2024-02-13 RX ORDER — ONDANSETRON HYDROCHLORIDE 2 MG/ML
4 INJECTION, SOLUTION INTRAVENOUS ONCE AS NEEDED
Status: COMPLETED | OUTPATIENT
Start: 2024-02-13 | End: 2024-02-13

## 2024-02-13 RX ORDER — PROPOFOL 10 MG/ML
INJECTION, EMULSION INTRAVENOUS AS NEEDED
Status: DISCONTINUED | OUTPATIENT
Start: 2024-02-13 | End: 2024-02-13

## 2024-02-13 RX ORDER — MIDAZOLAM HYDROCHLORIDE 1 MG/ML
2 INJECTION, SOLUTION INTRAMUSCULAR; INTRAVENOUS ONCE
Status: COMPLETED | OUTPATIENT
Start: 2024-02-13 | End: 2024-02-13

## 2024-02-13 RX ORDER — FENTANYL CITRATE 50 UG/ML
INJECTION, SOLUTION INTRAMUSCULAR; INTRAVENOUS AS NEEDED
Status: DISCONTINUED | OUTPATIENT
Start: 2024-02-13 | End: 2024-02-13

## 2024-02-13 RX ORDER — ROCURONIUM BROMIDE 10 MG/ML
INJECTION, SOLUTION INTRAVENOUS AS NEEDED
Status: DISCONTINUED | OUTPATIENT
Start: 2024-02-13 | End: 2024-02-13

## 2024-02-13 RX ORDER — GLYCOPYRROLATE 0.2 MG/ML
INJECTION INTRAMUSCULAR; INTRAVENOUS AS NEEDED
Status: DISCONTINUED | OUTPATIENT
Start: 2024-02-13 | End: 2024-02-13

## 2024-02-13 RX ORDER — LIDOCAINE HYDROCHLORIDE 10 MG/ML
0.1 INJECTION INFILTRATION; PERINEURAL ONCE
Status: DISCONTINUED | OUTPATIENT
Start: 2024-02-13 | End: 2024-02-13 | Stop reason: HOSPADM

## 2024-02-13 RX ADMIN — ROCURONIUM BROMIDE 50 MG: 10 INJECTION, SOLUTION INTRAVENOUS at 13:34

## 2024-02-13 RX ADMIN — SODIUM CHLORIDE, POTASSIUM CHLORIDE, SODIUM LACTATE AND CALCIUM CHLORIDE: 600; 310; 30; 20 INJECTION, SOLUTION INTRAVENOUS at 13:24

## 2024-02-13 RX ADMIN — PROPOFOL 140 MG: 10 INJECTION, EMULSION INTRAVENOUS at 13:34

## 2024-02-13 RX ADMIN — GLYCOPYRROLATE 0.2 MG: 0.2 INJECTION INTRAMUSCULAR; INTRAVENOUS at 14:15

## 2024-02-13 RX ADMIN — ONDANSETRON HYDROCHLORIDE 4 MG: 2 INJECTION INTRAMUSCULAR; INTRAVENOUS at 13:41

## 2024-02-13 RX ADMIN — VANCOMYCIN 1000 MG: 1 INJECTION, SOLUTION INTRAVENOUS at 11:41

## 2024-02-13 RX ADMIN — FENTANYL CITRATE 25 MCG: 0.05 INJECTION, SOLUTION INTRAMUSCULAR; INTRAVENOUS at 14:00

## 2024-02-13 RX ADMIN — ONDANSETRON 4 MG: 2 INJECTION INTRAMUSCULAR; INTRAVENOUS at 15:15

## 2024-02-13 RX ADMIN — PROMETHAZINE HYDROCHLORIDE 6.25 MG: 25 INJECTION INTRAMUSCULAR; INTRAVENOUS at 15:32

## 2024-02-13 RX ADMIN — MIDAZOLAM HYDROCHLORIDE 2 MG: 1 INJECTION, SOLUTION INTRAMUSCULAR; INTRAVENOUS at 12:54

## 2024-02-13 RX ADMIN — DEXAMETHASONE SODIUM PHOSPHATE 4 MG: 10 INJECTION INTRAMUSCULAR; INTRAVENOUS at 13:41

## 2024-02-13 RX ADMIN — FENTANYL CITRATE 50 MCG: 0.05 INJECTION, SOLUTION INTRAMUSCULAR; INTRAVENOUS at 12:54

## 2024-02-13 RX ADMIN — LIDOCAINE HYDROCHLORIDE 50 MG: 20 INJECTION, SOLUTION INFILTRATION; PERINEURAL at 13:34

## 2024-02-13 SDOH — HEALTH STABILITY: MENTAL HEALTH: CURRENT SMOKER: 0

## 2024-02-13 ASSESSMENT — PAIN - FUNCTIONAL ASSESSMENT
PAIN_FUNCTIONAL_ASSESSMENT: CPOT (CRITICAL CARE PAIN OBSERVATION TOOL)
PAIN_FUNCTIONAL_ASSESSMENT: 0-10
PAIN_FUNCTIONAL_ASSESSMENT: 0-10
PAIN_FUNCTIONAL_ASSESSMENT: CPOT (CRITICAL CARE PAIN OBSERVATION TOOL)
PAIN_FUNCTIONAL_ASSESSMENT: 0-10

## 2024-02-13 ASSESSMENT — PAIN SCALES - GENERAL
PAINLEVEL_OUTOF10: 0 - NO PAIN
PAINLEVEL_OUTOF10: 9
PAIN_LEVEL: 3
PAINLEVEL_OUTOF10: 0 - NO PAIN

## 2024-02-13 NOTE — OP NOTE
Repair Rotator Cuff Shoulder (R) Operative Note     Date: 2024  OR Location: [facility] OR    Name: Indu Red, : 1950, Age: 73 y.o., MRN: [id number], Sex: female    Diagnosis  Pre-op Diagnosis     * Partial nontraumatic tear of right rotator cuff [M75.111] Post-op Diagnosis     * Partial nontraumatic tear of right rotator cuff [M75.111]     Procedures  Repair Rotator Cuff Shoulder  34025 - [place] OPEN REPAIR OF ROTATOR CUFF CHRONIC      Surgeons      * Jarad Gaffney - Primary    Resident/Fellow/Other Assistant:  Surgeon(s) and Role:    Procedure Summary  Anesthesia: Consult  ASA: III  Anesthesia Staff: Anesthesiologist: Vance Tucker MD; Mario Tamez MD  CRNA: RONNIE Piña-CRNA  Estimated Blood Loss: minimal   Intra-op Medications: Administrations occurring from 1330 to 1530 on 24:  * No intraprocedure medications in log *           Anesthesia Record               Intraprocedure I/O Totals       None           Specimen: No specimens collected     Staff:   Circulator: Alma Renteria RN; Bindu Marquez RN  Scrub Person: Brittni Farley         Drains and/or Catheters: * None in log *    Tourniquet Times:         Implants:     Findings: See procedure details below    Indications: Indu Red is an 73 y.o. female who is having surgery for Partial nontraumatic tear of right rotator cuff [M75.111].   options are discussed with the patient in detail.. On physical examination, the patient has limitation with ROM of the right shoulder and pain with ROM of the right shoulder. MRI of the right shoulder shows that there is a rotator cuff tear.   Repair of the  torn rightrotator cuff with indications, alternatives, potential risks including but not limited to risk of infection, blood clot, blood loss, nerve or blood vessel damage, dislocation stroke or death, benefits, unforseen risks, the rehab involved and the fact that no guarantee can be made were all discussed with the patient in detail.  The patient understands, accepts and wishes to proceed with  right rotator cuff repairas the patient cannot complete normal activities of daily living that are important to the patient including activities of daily living. I agree.  Please note that this report has been produced using speech recognition software. It may contain errors related to grammar, punctuation or spelling. Electronically signed, but not reviewed.    The patient was seen in the preoperative area. The risks, benefits, complications, treatment options, non-operative alternatives, expected recovery and outcomes were discussed with the patient. The possibilities of reaction to medication, pulmonary aspiration, injury to surrounding structures, bleeding, recurrent infection, the need for additional procedures, failure to diagnose a condition, and creating a complication requiring transfusion or operation were discussed with the patient. The patient concurred with the proposed plan, giving informed consent.  The site of surgery was properly noted/marked if necessary per policy. The patient has been actively warmed in preoperative area. Preoperative antibiotics have been ordered and given within 1 hours of incision. Venous thrombosis prophylaxis have been ordered including bilateral sequential compression devices    Procedure Details:  the patient was taken to the operating room and was placed under general anesthesia without complications.    She was then placed in a sitting position on the shoulder table.  the right shoulder and upper extremity were widely prepped using triple prep of chlorhexidine, alcohol and ChloraPrep and after allowing the prep to dry were draped in the usual sterile manner. Time-out was done. I first made an incision starting at the tip of the acromion anteriorly and carried the incision distally for 1 and 0.5 inches or 3.75 centimeters and then extended the incision as needed. The deltoid fascia was incised in the line of its  fibers for 1-1/2 inches. Throughout the entire procedure hemostasis was achieved using the Bovie and throughout the entire procedure the [] shoulder wound was copiously irrigated with saline. Exposure was facilitated with self-retaining retractors and with the assistance of the surgical assistant. After incising the fascia I immediately noted that there was a full-thickness  right rotator cuff tear.There was a hook to the acromion anteriorly. Using a glenoid retractor for exposure I removed the anterior hook from the acromion using a high-speed bur and then smoothed the acromion with a rasp. There was no longer a hook to the acromion. I repaired the torn rotator cuff using interrupted 1. Ethibond sutures. I then placed the shoulder through range of motion and there was no impingement of the rotator cuff repair by the acromion. The humeral head was now completely covered. After further copious irrigation the fascia was closed using interrupted and running 0 Vicryl suture. Subcutaneous tissue was closed using running 2 0 Vicryl. Skin was closed running subcuticular Monocryl. Sterile dressing and sling were applied and the patient returned to the PACU.  Complications:  None; patient tolerated the procedure well.    Disposition: PACU - hemodynamically stable.  Condition: stable         Additional Details:  none    Attending Attestation: I performed the procedure.    Jarad Gaffney  Phone Number: [phone]

## 2024-02-13 NOTE — ANESTHESIA PROCEDURE NOTES
Airway  Date/Time: 2/13/2024 1:37 PM  Urgency: elective      Staffing  Performed: CRNA   Authorized by: Mario Tamez MD    Performed by: RONNIE Piña-CRNA  Patient location during procedure: OR    Indications and Patient Condition  Indications for airway management: anesthesia  Spontaneous Ventilation: absent  Sedation level: deep  Preoxygenated: yes  Patient position: sniffing  Mask difficulty assessment: 1 - vent by mask  Planned trial extubation    Final Airway Details  Final airway type: endotracheal airway      Successful airway: ETT  Cuffed: yes   Successful intubation technique: direct laryngoscopy  Facilitating devices/methods: intubating stylet  Blade: Jocelyn  Blade size: #3  ETT size (mm): 7.0  Cormack-Lehane Classification: grade I - full view of glottis  Placement verified by: chest auscultation and capnometry   Measured from: lips  ETT to lips (cm): 22  Number of attempts at approach: 1

## 2024-02-13 NOTE — ANESTHESIA PREPROCEDURE EVALUATION
Patient: Indu Red    Procedure Information       Date/Time: 02/13/24 1330    Procedure: Repair Rotator Cuff Shoulder (Right: Shoulder)    Location: [facility] / [facility]    Surgeons: Jarad Gaffney MD            Relevant Problems   Cardiovascular   (+) Abnormal electrocardiography   (+) Arteriosclerotic heart disease   (+) Benign essential hypertension   (+) Hypercholesterolemia   (+) Hypertension      Endocrine   (+) Postoperative hypothyroidism      GI   (+) GERD (gastroesophageal reflux disease)      /Renal   (+) ELLIE (acute kidney injury) (CMS/HCC)   (+) Acute cystitis with hematuria   (+) Chronic kidney disease, stage 3a (CMS/HCC)      Neuro/Psych   (+) Cervical radiculitis   (+) Multiple sclerosis (CMS/HCC)   (+) Radiculopathy      Hematology   (+) Anemia      Musculoskeletal   (+) DJD (degenerative joint disease), cervical   (+) Osteoarthritis      Other   (+) Adhesive capsulitis of right shoulder       Clinical information reviewed:   Tobacco  Allergies    Med Hx  Surg Hx   Fam Hx  Soc Hx        NPO Detail:  NPO/Void Status  Carbohydrate Drink Given Prior to Surgery? : N  Date of Last Liquid: 02/12/24  Time of Last Liquid: 2100  Date of Last Solid: 02/12/24  Time of Last Solid: 2100  Time of Last Void: 0900         Physical Exam    Airway  Mallampati: II  TM distance: >3 FB  Neck ROM: full     Cardiovascular    Dental - normal exam     Pulmonary    Abdominal            Anesthesia Plan    History of general anesthesia?: yes  History of complications of general anesthesia?: no    ASA 3     general and regional     The patient is not a current smoker.    intravenous induction   Anesthetic plan and risks discussed with patient.    Plan discussed with attending and CRNA.

## 2024-02-13 NOTE — ANESTHESIA PROCEDURE NOTES
Peripheral Block    Patient location during procedure: pre-op  Start time: 2/13/2024 12:58 PM  End time: 2/13/2024 12:59 PM  Reason for block: at surgeon's request and post-op pain management  Staffing  Performed: attending   Authorized by: Mario Tamez MD    Performed by: Mario Tamez MD  Preanesthetic Checklist  Completed: patient identified, IV checked, site marked, risks and benefits discussed, surgical consent, monitors and equipment checked, pre-op evaluation and timeout performed   Timeout performed at: 2/13/2024 12:52 PM  Peripheral Block  Patient position: laying flat  Prep: ChloraPrep  Patient monitoring: heart rate, cardiac monitor and continuous pulse ox  Block type: interscalene  Laterality: right  Injection technique: single-shot  Guidance: ultrasound guided  Local infiltration: ropivacaine  Infiltration strength: 0.5 %  Dose: 20 mL  Needle  Needle type: short-bevel   Needle gauge: 22 G  Needle length: 5 cm  Needle localization: ultrasound guidance  Assessment  Injection assessment: negative aspiration for heme, no paresthesia on injection, incremental injection and local visualized surrounding nerve on ultrasound  Paresthesia pain: none  Heart rate change: no  Slow fractionated injection: yes

## 2024-02-13 NOTE — DISCHARGE INSTRUCTIONS
Discharge Instructions for Peripheral Nerve Block for Upper Extremity  Notify the anesthesiologist on call at [phone]:  If you have any questions or problems regarding your nerve block, go to the nearest emergency room or call 911. If you have coughing, chest pain, and/or shortness of breath unrelieved by sitting up. This may be a serious emergency.     Activity:  Your shoulder, arm, and hand will be numb and weak after surgery.   You will not be able to move your arm until the medicine wears off.  Protect the position of your arm, especially the elbow. Keep your arm in your sling resting on the two pillows while you are awake or sleeping.  Avoid putting your arm or objects that are extremely hot or cold. Your ability to feel hot and cold will be decreased until the numbing medicine wears off.    Pain Medicine:  The numbing effect of the nerve block can last:  Marcaine 16-24 hours  Exparel 28-72 hours  Take your pain medicine the night of surgery before going to sleep or before you feel the numbing medicine starting to wear off.   Take your pain medicine as specified during the day and night even if you do not feel pain.     Additional Instructions:  Have a responsible adult remain with you to assist you at home after surgery. Remember that you will not be able to use your surgical arm to perform activities such as dressing, washing, and eating.   You may experience numbness on the side of your face, hoarseness or congestion or have a red eye and drooping eyelid on the side of surgery. These side effects will decrease as the anesthesia in the shoulder wears off.   You may feel discomfort when breathing after surgery and during recovery. This is caused by the numbness of the nerve the supplies the diaphragm (breathing muscle) on the side of the surgery. You will feel better if you rest and sleep with your head and upper body at a 45 degree angle by using 2-3 pillow or be sitting in a recliner chair. This  discomfort decreases as the anesthesia in the shoulder wears off.

## 2024-02-13 NOTE — ANESTHESIA POSTPROCEDURE EVALUATION
Patient: Indu Red    Procedure Summary       Date: 02/13/24 Room / Location: [facility] OR 08 / [facility]    Anesthesia Start: 1329 Anesthesia Stop: 1502    Procedure: Repair Rotator Cuff Shoulder (Right: Shoulder) Diagnosis:       Partial nontraumatic tear of right rotator cuff      (Partial nontraumatic tear of right rotator cuff [M75.111])    Surgeons: Jarad Gaffney MD Responsible Provider: BONY Piña    Anesthesia Type: general, regional ASA Status: 3            Anesthesia Type: general, regional    Vitals Value Taken Time   /67 02/13/24 1532   Temp 36.3 °C (97.3 °F) 02/13/24 1501   Pulse 95 02/13/24 1534   Resp 31 02/13/24 1534   SpO2 97 % 02/13/24 1534   Vitals shown include unvalidated device data.    Anesthesia Post Evaluation    Patient location during evaluation: bedside  Patient participation: complete - patient participated  Level of consciousness: awake  Pain score: 3  Pain management: adequate  Multimodal analgesia pain management approach  Airway patency: patent  Two or more strategies used to mitigate risk of obstructive sleep apnea  Cardiovascular status: acceptable  Respiratory status: acceptable  Hydration status: acceptable  Postoperative Nausea and Vomiting: none        No notable events documented.

## 2024-02-22 ENCOUNTER — HOME HEALTH ADMISSION (OUTPATIENT)
Dept: HOME HEALTH SERVICES | Facility: HOME HEALTH | Age: 74
End: 2024-02-22

## 2024-02-22 ENCOUNTER — DOCUMENTATION (OUTPATIENT)
Dept: HOME HEALTH SERVICES | Facility: HOME HEALTH | Age: 74
End: 2024-02-22

## 2024-02-22 ENCOUNTER — OFFICE VISIT (OUTPATIENT)
Dept: ORTHOPEDIC SURGERY | Facility: CLINIC | Age: 74
End: 2024-02-22
Payer: MEDICARE

## 2024-02-22 ENCOUNTER — TELEPHONE (OUTPATIENT)
Dept: HOME HEALTH SERVICES | Facility: HOME HEALTH | Age: 74
End: 2024-02-22

## 2024-02-22 VITALS — WEIGHT: 124.56 LBS | BODY MASS INDEX: 22.78 KG/M2

## 2024-02-22 DIAGNOSIS — M75.111 PARTIAL NONTRAUMATIC TEAR OF RIGHT ROTATOR CUFF: ICD-10-CM

## 2024-02-22 DIAGNOSIS — M25.511 RIGHT SHOULDER PAIN, UNSPECIFIED CHRONICITY: Primary | ICD-10-CM

## 2024-02-22 DIAGNOSIS — Z98.890 S/P RIGHT ROTATOR CUFF REPAIR: ICD-10-CM

## 2024-02-22 PROCEDURE — 1126F AMNT PAIN NOTED NONE PRSNT: CPT | Performed by: PHYSICIAN ASSISTANT

## 2024-02-22 PROCEDURE — 1159F MED LIST DOCD IN RCRD: CPT | Performed by: PHYSICIAN ASSISTANT

## 2024-02-22 PROCEDURE — 1160F RVW MEDS BY RX/DR IN RCRD: CPT | Performed by: PHYSICIAN ASSISTANT

## 2024-02-22 PROCEDURE — 99024 POSTOP FOLLOW-UP VISIT: CPT | Performed by: PHYSICIAN ASSISTANT

## 2024-02-22 RX ORDER — TRAMADOL HYDROCHLORIDE 50 MG/1
50 TABLET ORAL EVERY 6 HOURS PRN
Qty: 28 TABLET | Refills: 0 | Status: SHIPPED | OUTPATIENT
Start: 2024-02-22 | End: 2024-02-29

## 2024-02-22 ASSESSMENT — PAIN DESCRIPTION - DESCRIPTORS: DESCRIPTORS: ACHING;DULL

## 2024-02-22 ASSESSMENT — PAIN SCALES - GENERAL: PAINLEVEL_OUTOF10: 7

## 2024-02-22 ASSESSMENT — ENCOUNTER SYMPTOMS
OCCASIONAL FEELINGS OF UNSTEADINESS: 0
DEPRESSION: 0
LOSS OF SENSATION IN FEET: 0

## 2024-02-22 ASSESSMENT — PAIN - FUNCTIONAL ASSESSMENT: PAIN_FUNCTIONAL_ASSESSMENT: 0-10

## 2024-02-22 ASSESSMENT — LIFESTYLE VARIABLES: TOTAL SCORE: 0

## 2024-02-22 NOTE — LETTER
February 22, 2024     Patient: Indu Red   YOB: 1950   Date of Visit: 2/22/2024       To Whom It May Concern:    It is my medical opinion that Rashida Allan was here with her mother for a doctors appointment on 2- and may return to work on 2-.    If you have any questions or concerns, please don't hesitate to call.         Sincerely,        Gill Etienne PA-C    CC: No Recipients

## 2024-02-22 NOTE — HH CARE COORDINATION
Home Care received a referral for Physical Therapy. Unfortunately, we are unable to accept and process the referral at this time.    Patients, please reach out to the referring provider or your PCP to assist in obtaining an alternative home care agency and/or guidance to meet your needs.    Providers, please reach out to  Home Care with any questions regarding the declined referral.      Home

## 2024-02-22 NOTE — PROGRESS NOTES
Subjective      Past Surgical History:   Procedure Laterality Date    APPENDECTOMY  08/03/2016    COLONOSCOPY  08/03/2016    With polypectomy    ESOPHAGOGASTRODUODENOSCOPY      FOOT SURGERY Left     MR HEAD ANGIO WO IV CONTRAST  07/21/2023    MR HEAD ANGIO WO IV CONTRAST 7/21/2023 [facility] MRI    MR NECK ANGIO WO IV CONTRAST  07/21/2023    MR NECK ANGIO WO IV CONTRAST 7/21/2023 [facility] MRI    OOPHORECTOMY  08/03/2016    OTHER SURGICAL HISTORY  08/03/2016    Cervical Conization    SALPINGECTOMY      TEMPOROMANDIBULAR JOINT SURGERY      THYROIDECTOMY, PARTIAL      Substernal    TOTAL VAGINAL HYSTERECTOMY  03/09/2017          Patient History      This patient is s/p right rotator cuff repair done by Dr. Gaffney on 2-. They present today and state that their right shoulder pain is 4/10. They are slowly resuming their normal activities of daily living. They deny chest pain, shortness of breath.     There were no vitals taken for this visit.     ROS  CARDIOLOGY:   Negative for chest pain, shortness of breath.   RESPIRATORY:   Negative for chest pain, shortness of breath.   MUSCULOSKELETAL:   See HPI for details.   NEUROLOGY:   Negative for tingling, numbness, weakness.      Physical exam: Right shoulder: incision is clean dry and well healing. The right upper extremity is in good position in a sling. Patient is able to complete ROM in small circles with no pain. Compartments are soft. Neurovascular is intact.     No images are attached to the encounter or orders placed in the encounter.     Indu was seen today for post-op.  Diagnoses and all orders for this visit:  Right shoulder pain, unspecified chronicity (Primary)  S/P right rotator cuff repair  Options are discussed with the patient in detail. The patient is given a prescription for physical therapy to evaluate and treat with gentle passive ROM. The patient is instructed regarding activity modification and risk for further injury with falling or trauma, ice, provider  directed at home gentle strengthening and ROM exercises, and the appropriate use of Tylenol as needed for pain with its potential adverse reactions and side effects. The patient understands. Return in 4 weeks or sooner as needed. Please note that this report has been produced using speech recognition software.  It may contain errors related to grammar, punctuation or spelling.  Electronically signed, but not reviewed.

## 2024-02-22 NOTE — TELEPHONE ENCOUNTER
[facility] received your referral for this pt. Due to current staffing constraints, [facility] is not able to accept Please refer to another agency. At this time, we will close the referral. Thank you

## 2024-02-22 NOTE — PATIENT INSTRUCTIONS
Thank you for coming to see us today!     Continue to use tylenol for pain control.   Rest, ice and elevate and remember to do exercises.   We are going to give you a referral for home physical therapy.     Follow up in 4 weeks for advancement in therapy.

## 2024-02-23 NOTE — TELEPHONE ENCOUNTER
Spoke to patient,  she will call her insurance plan and find out who she can go to within her plan,.   She will then call me back and I will order this for her

## 2024-02-23 NOTE — TELEPHONE ENCOUNTER
Patient called back and she was unable to reach anyone today. She will try again on 2/26/2024 and call me back

## 2024-02-26 ENCOUNTER — TELEPHONE (OUTPATIENT)
Dept: ORTHOPEDIC SURGERY | Facility: CLINIC | Age: 74
End: 2024-02-26
Payer: MEDICARE

## 2024-02-26 NOTE — TELEPHONE ENCOUNTER
Faxed homecare orders to [facility] at [phone], they will call with in 3 days to see if they can take her as a client.

## 2024-02-27 ENCOUNTER — APPOINTMENT (OUTPATIENT)
Dept: ORTHOPEDIC SURGERY | Facility: CLINIC | Age: 74
End: 2024-02-27
Payer: MEDICARE

## 2024-02-27 NOTE — TELEPHONE ENCOUNTER
[facility] called,  needed another face to face .  I faxed over her last office visit.  They will then called back to let me know if they can take her.

## 2024-02-27 NOTE — TELEPHONE ENCOUNTER
[facility] return call, they are accepting the patient and will contact her shortly.    I have called the patient and let her know

## 2024-03-01 DIAGNOSIS — G35 MULTIPLE SCLEROSIS (MULTI): ICD-10-CM

## 2024-03-01 DIAGNOSIS — G89.29 CHRONIC LOW BACK PAIN WITH SCIATICA, SCIATICA LATERALITY UNSPECIFIED, UNSPECIFIED BACK PAIN LATERALITY: ICD-10-CM

## 2024-03-01 DIAGNOSIS — E89.0 POSTOPERATIVE HYPOTHYROIDISM: ICD-10-CM

## 2024-03-01 DIAGNOSIS — M54.40 CHRONIC LOW BACK PAIN WITH SCIATICA, SCIATICA LATERALITY UNSPECIFIED, UNSPECIFIED BACK PAIN LATERALITY: ICD-10-CM

## 2024-03-01 DIAGNOSIS — G89.29 OTHER CHRONIC PAIN: ICD-10-CM

## 2024-03-06 RX ORDER — VENLAFAXINE 25 MG/1
25 TABLET ORAL 2 TIMES DAILY
Qty: 180 TABLET | Refills: 1 | Status: SHIPPED | OUTPATIENT
Start: 2024-03-06

## 2024-03-06 RX ORDER — LEVOTHYROXINE SODIUM 50 UG/1
50 TABLET ORAL DAILY
Qty: 90 TABLET | Refills: 1 | Status: SHIPPED | OUTPATIENT
Start: 2024-03-06

## 2024-03-22 ENCOUNTER — OFFICE VISIT (OUTPATIENT)
Dept: ORTHOPEDIC SURGERY | Facility: CLINIC | Age: 74
End: 2024-03-22
Payer: MEDICARE

## 2024-03-22 VITALS — WEIGHT: 124.56 LBS | BODY MASS INDEX: 22.78 KG/M2

## 2024-03-22 DIAGNOSIS — G89.18 POST-OPERATIVE PAIN: ICD-10-CM

## 2024-03-22 DIAGNOSIS — Z98.890 S/P RIGHT ROTATOR CUFF REPAIR: Primary | ICD-10-CM

## 2024-03-22 PROCEDURE — 1159F MED LIST DOCD IN RCRD: CPT | Performed by: ORTHOPAEDIC SURGERY

## 2024-03-22 PROCEDURE — 1125F AMNT PAIN NOTED PAIN PRSNT: CPT | Performed by: ORTHOPAEDIC SURGERY

## 2024-03-22 PROCEDURE — 1160F RVW MEDS BY RX/DR IN RCRD: CPT | Performed by: ORTHOPAEDIC SURGERY

## 2024-03-22 PROCEDURE — 99024 POSTOP FOLLOW-UP VISIT: CPT | Performed by: ORTHOPAEDIC SURGERY

## 2024-03-22 RX ORDER — TRAMADOL HYDROCHLORIDE 50 MG/1
50 TABLET ORAL EVERY 8 HOURS PRN
Qty: 28 TABLET | Refills: 0 | Status: SHIPPED | OUTPATIENT
Start: 2024-03-22 | End: 2024-03-27

## 2024-03-22 ASSESSMENT — PAIN SCALES - GENERAL: PAINLEVEL_OUTOF10: 6

## 2024-03-22 ASSESSMENT — ENCOUNTER SYMPTOMS
OCCASIONAL FEELINGS OF UNSTEADINESS: 0
DEPRESSION: 0
LOSS OF SENSATION IN FEET: 0

## 2024-03-22 ASSESSMENT — PAIN - FUNCTIONAL ASSESSMENT: PAIN_FUNCTIONAL_ASSESSMENT: 0-10

## 2024-03-22 ASSESSMENT — LIFESTYLE VARIABLES: TOTAL SCORE: 0

## 2024-03-22 ASSESSMENT — PAIN DESCRIPTION - DESCRIPTORS: DESCRIPTORS: ACHING

## 2024-03-22 NOTE — PATIENT INSTRUCTIONS
Thank you for coming to see us today!     Continue to use tylenol for pain control.   Rest, ice and elevate and remember to do exercises.   We are going to give you a referral for physical therapy.   Follow up in 4 weeks

## 2024-03-22 NOTE — PROGRESS NOTES
Subjective      Past Surgical History:   Procedure Laterality Date    APPENDECTOMY  08/03/2016    COLONOSCOPY  08/03/2016    With polypectomy    ESOPHAGOGASTRODUODENOSCOPY      FOOT SURGERY Left     MR HEAD ANGIO WO IV CONTRAST  07/21/2023    MR HEAD ANGIO WO IV CONTRAST 7/21/2023 [facility]    MR NECK ANGIO WO IV CONTRAST  07/21/2023    MR NECK ANGIO WO IV CONTRAST 7/21/2023 [facility]    OOPHORECTOMY  08/03/2016    OTHER SURGICAL HISTORY  08/03/2016    Cervical Conization    SALPINGECTOMY      TEMPOROMANDIBULAR JOINT SURGERY      THYROIDECTOMY, PARTIAL      Substernal    TOTAL VAGINAL HYSTERECTOMY  03/09/2017          Patient History      This patient is s/p right rotator cuff repair done by me on 2-. They present today and state that their right shoulder pain is 4/10.  She is slowly resuming their normal activities of daily living.  She denied chest pain, shortness of breath.     There were no vitals taken for this visit.     ROS  CARDIOLOGY:   Negative for chest pain, shortness of breath.   RESPIRATORY:   Negative for chest pain, shortness of breath.   MUSCULOSKELETAL:   See HPI for details.   NEUROLOGY:   Negative for tingling, numbness, weakness.      Physical exam: Right shoulder: incision is clean dry and well healing. The right upper extremity is in good position in a sling. Patient is able to complete ROM in small circles with no pain.  He is able to initiate active flexion and active abduction.  compartments are soft. Neurovascular is intact.     No images are attached to the encounter or orders placed in the encounter.     Indu was seen today for post-op.  Diagnoses and all orders for this visit:  Right shoulder pain, unspecified chronicity (Primary)  S/P right rotator cuff repair  Options are discussed with the patient in detail. The patient is given a prescription for physical therapy to evaluate and treat with gentle passive and now advancing to active assisted and active ROM. The patient is  instructed regarding activity modification and risk for further injury with falling or trauma, ice, provider directed at home gentle strengthening and ROM exercises, and the appropriate use of Tylenol as needed for pain with its potential adverse reactions and side effects. The patient understands. Return in 4 weeks or sooner as needed. Please note that this report has been produced using speech recognition software.  It may contain errors related to grammar, punctuation or spelling.  Electronically signed, but not reviewed.

## 2024-04-18 ENCOUNTER — OFFICE VISIT (OUTPATIENT)
Dept: ORTHOPEDIC SURGERY | Facility: CLINIC | Age: 74
End: 2024-04-18
Payer: COMMERCIAL

## 2024-04-18 VITALS — WEIGHT: 124.56 LBS | BODY MASS INDEX: 22.78 KG/M2

## 2024-04-18 DIAGNOSIS — M25.511 RIGHT SHOULDER PAIN, UNSPECIFIED CHRONICITY: Primary | ICD-10-CM

## 2024-04-18 DIAGNOSIS — Z98.890 S/P RIGHT ROTATOR CUFF REPAIR: ICD-10-CM

## 2024-04-18 PROCEDURE — 1160F RVW MEDS BY RX/DR IN RCRD: CPT | Performed by: PHYSICIAN ASSISTANT

## 2024-04-18 PROCEDURE — 99024 POSTOP FOLLOW-UP VISIT: CPT | Performed by: PHYSICIAN ASSISTANT

## 2024-04-18 PROCEDURE — 1159F MED LIST DOCD IN RCRD: CPT | Performed by: PHYSICIAN ASSISTANT

## 2024-04-18 PROCEDURE — 1125F AMNT PAIN NOTED PAIN PRSNT: CPT | Performed by: PHYSICIAN ASSISTANT

## 2024-04-18 RX ORDER — TRAMADOL HYDROCHLORIDE 50 MG/1
TABLET ORAL
COMMUNITY

## 2024-04-18 ASSESSMENT — ENCOUNTER SYMPTOMS
LOSS OF SENSATION IN FEET: 0
DEPRESSION: 0
OCCASIONAL FEELINGS OF UNSTEADINESS: 0

## 2024-04-18 ASSESSMENT — PAIN - FUNCTIONAL ASSESSMENT: PAIN_FUNCTIONAL_ASSESSMENT: 0-10

## 2024-04-18 ASSESSMENT — PAIN DESCRIPTION - DESCRIPTORS: DESCRIPTORS: ACHING;NAGGING

## 2024-04-18 ASSESSMENT — LIFESTYLE VARIABLES: TOTAL SCORE: 0

## 2024-04-18 ASSESSMENT — PAIN SCALES - GENERAL: PAINLEVEL_OUTOF10: 7

## 2024-04-18 NOTE — PATIENT INSTRUCTIONS
Thank you for coming to see us today!     Continue to use tylenol for pain control.   Rest, ice and elevate and remember to do exercises.   Continue physical therapy only as your pain allows.    Follow up after 5- with KAYY

## 2024-04-18 NOTE — PROGRESS NOTES
Subjective      Past Surgical History:   Procedure Laterality Date    APPENDECTOMY  08/03/2016    COLONOSCOPY  08/03/2016    With polypectomy    ESOPHAGOGASTRODUODENOSCOPY      FOOT SURGERY Left     MR HEAD ANGIO WO IV CONTRAST  07/21/2023    MR HEAD ANGIO WO IV CONTRAST 7/21/2023 [facility]    MR NECK ANGIO WO IV CONTRAST  07/21/2023    MR NECK ANGIO WO IV CONTRAST 7/21/2023 [facility]    OOPHORECTOMY  08/03/2016    OTHER SURGICAL HISTORY  08/03/2016    Cervical Conization    SALPINGECTOMY      TEMPOROMANDIBULAR JOINT SURGERY      THYROIDECTOMY, PARTIAL      Substernal    TOTAL VAGINAL HYSTERECTOMY  03/09/2017          Patient History      This patient is s/p right rotator cuff repair done by Dr. Gaffney on 2-. They present today and state that their right shoulder pain is 4/10.  She is slowly resuming their normal activities of daily living. She is working with outpatient physical therapy. She noticed an increase in right shoulder pain after physical therapy on Monday.  She denied chest pain, shortness of breath.     There were no vitals taken for this visit.     ROS  CARDIOLOGY:   Negative for chest pain, shortness of breath.   RESPIRATORY:   Negative for chest pain, shortness of breath.   MUSCULOSKELETAL:   See HPI for details.   NEUROLOGY:   Negative for tingling, numbness, weakness.      Physical exam: Right shoulder: incision is clean dry and well healing. The right upper extremity is in good position. Patient is able to complete ROM in small circles with no pain. She has painless ROM in active flexion from 0-110 and active abduction from 0-90 degrees. She is able to initiate active flexion and active abduction.  compartments are soft. Neurovascular is intact.       Indu was seen today for post-op.  Diagnoses and all orders for this visit:  Right shoulder pain, unspecified chronicity (Primary)  S/P right rotator cuff repair  Options are discussed with the patient in detail. The patient is instructed to  continue with physical therapy to evaluate and treat with gentle passive and now advancing to active assisted and active ROM and to only do exercise as her pain allows.  The patient is instructed regarding activity modification and risk for further injury with falling or trauma, ice, provider directed at home gentle strengthening and ROM exercises, and the appropriate use of Tylenol as needed for pain with its potential adverse reactions and side effects. The patient understands. Return after 5- or sooner as needed. Please note that this report has been produced using speech recognition software.  It may contain errors related to grammar, punctuation or spelling.  Electronically signed, but not reviewed.

## 2024-05-06 ENCOUNTER — OFFICE VISIT (OUTPATIENT)
Dept: PRIMARY CARE | Facility: CLINIC | Age: 74
End: 2024-05-06
Payer: COMMERCIAL

## 2024-05-06 ENCOUNTER — LAB (OUTPATIENT)
Dept: LAB | Facility: LAB | Age: 74
End: 2024-05-06
Payer: COMMERCIAL

## 2024-05-06 VITALS
HEIGHT: 64 IN | WEIGHT: 127.8 LBS | RESPIRATION RATE: 18 BRPM | BODY MASS INDEX: 21.82 KG/M2 | SYSTOLIC BLOOD PRESSURE: 140 MMHG | DIASTOLIC BLOOD PRESSURE: 86 MMHG | HEART RATE: 76 BPM

## 2024-05-06 DIAGNOSIS — M19.90 UNSPECIFIED OSTEOARTHRITIS, UNSPECIFIED SITE: ICD-10-CM

## 2024-05-06 DIAGNOSIS — I10 BENIGN ESSENTIAL HYPERTENSION: ICD-10-CM

## 2024-05-06 DIAGNOSIS — I10 BENIGN ESSENTIAL HYPERTENSION: Primary | ICD-10-CM

## 2024-05-06 DIAGNOSIS — N18.31 CHRONIC KIDNEY DISEASE, STAGE 3A (MULTI): ICD-10-CM

## 2024-05-06 DIAGNOSIS — E78.00 HYPERCHOLESTEROLEMIA: ICD-10-CM

## 2024-05-06 PROCEDURE — 3079F DIAST BP 80-89 MM HG: CPT | Performed by: INTERNAL MEDICINE

## 2024-05-06 PROCEDURE — 1160F RVW MEDS BY RX/DR IN RCRD: CPT | Performed by: INTERNAL MEDICINE

## 2024-05-06 PROCEDURE — 3077F SYST BP >= 140 MM HG: CPT | Performed by: INTERNAL MEDICINE

## 2024-05-06 PROCEDURE — 80061 LIPID PANEL: CPT

## 2024-05-06 PROCEDURE — 80053 COMPREHEN METABOLIC PANEL: CPT

## 2024-05-06 PROCEDURE — 1159F MED LIST DOCD IN RCRD: CPT | Performed by: INTERNAL MEDICINE

## 2024-05-06 PROCEDURE — 99214 OFFICE O/P EST MOD 30 MIN: CPT | Performed by: INTERNAL MEDICINE

## 2024-05-06 PROCEDURE — 36415 COLL VENOUS BLD VENIPUNCTURE: CPT

## 2024-05-06 RX ORDER — DICLOFENAC SODIUM 10 MG/G
4 GEL TOPICAL 2 TIMES DAILY PRN
Qty: 200 G | Refills: 5 | Status: SHIPPED | OUTPATIENT
Start: 2024-05-06

## 2024-05-06 ASSESSMENT — ENCOUNTER SYMPTOMS
SORE THROAT: 0
CHILLS: 0
DEPRESSION: 0
MYALGIAS: 0
DYSURIA: 0
PALPITATIONS: 0
DIZZINESS: 0
RHINORRHEA: 0
APPETITE CHANGE: 0
BACK PAIN: 0
DIAPHORESIS: 0
ABDOMINAL DISTENTION: 0
LIGHT-HEADEDNESS: 0
NECK PAIN: 0
SHORTNESS OF BREATH: 0
JOINT SWELLING: 0
HEMATURIA: 0
FATIGUE: 0
FREQUENCY: 0
SINUS PRESSURE: 0
WEAKNESS: 0
LOSS OF SENSATION IN FEET: 0
NECK STIFFNESS: 0
WHEEZING: 0
OCCASIONAL FEELINGS OF UNSTEADINESS: 0
CONSTIPATION: 0
COUGH: 0
ARTHRALGIAS: 0
NAUSEA: 0
FEVER: 0
DIFFICULTY URINATING: 0
VOMITING: 0
ABDOMINAL PAIN: 0
HEADACHES: 0
DIARRHEA: 0
BLOOD IN STOOL: 0
NUMBNESS: 0

## 2024-05-06 NOTE — PROGRESS NOTES
"Subjective   Patient ID: Indu Red is a 73 y.o. female who presents for Follow-up.    HPI   She presents for follow-up.     Review of Systems   Constitutional:  Negative for appetite change, chills, diaphoresis, fatigue and fever.   HENT:  Negative for congestion, ear discharge, ear pain, hearing loss, postnasal drip, rhinorrhea, sinus pressure, sore throat and tinnitus.    Eyes:  Negative for visual disturbance.   Respiratory:  Negative for cough, shortness of breath and wheezing.    Cardiovascular:  Negative for chest pain, palpitations and leg swelling.   Gastrointestinal:  Negative for abdominal distention, abdominal pain, blood in stool, constipation, diarrhea, nausea and vomiting.   Genitourinary:  Negative for decreased urine volume, difficulty urinating, dysuria, frequency, hematuria and urgency.   Musculoskeletal:  Negative for arthralgias, back pain, gait problem, joint swelling, myalgias, neck pain and neck stiffness.   Skin:  Negative for rash.   Neurological:  Negative for dizziness, weakness, light-headedness, numbness and headaches.       Objective   /86   Pulse 76   Resp 18   Ht 1.626 m (5' 4\")   Wt 58 kg (127 lb 12.8 oz)   BMI 21.94 kg/m²     Physical Exam  Vitals reviewed.   Constitutional:       Appearance: Normal appearance. She is normal weight.   HENT:      Right Ear: Tympanic membrane and external ear normal.      Left Ear: Tympanic membrane and external ear normal.   Eyes:      Extraocular Movements: Extraocular movements intact.      Conjunctiva/sclera: Conjunctivae normal.      Pupils: Pupils are equal, round, and reactive to light.   Cardiovascular:      Rate and Rhythm: Normal rate and regular rhythm.      Pulses: Normal pulses.   Pulmonary:      Effort: Pulmonary effort is normal.      Breath sounds: Normal breath sounds.   Abdominal:      General: Bowel sounds are normal.      Palpations: Abdomen is soft.   Musculoskeletal:         General: Normal range of motion.      " Cervical back: Normal range of motion.   Skin:     General: Skin is warm and dry.   Neurological:      General: No focal deficit present.      Mental Status: She is oriented to person, place, and time.   Psychiatric:         Mood and Affect: Mood normal.         Behavior: Behavior normal.           Assessment/Plan   Problem List Items Addressed This Visit             ICD-10-CM    Benign essential hypertension - Primary I10     -BP above target goal 130/80  -CMP, TSH ordered  -Continue current antihypertensives  -Keep BP log  -Educated about adverse effects of uncontrolled blood pressure,    -Low sodium diet, regular exercise recommended           Relevant Orders    Comprehensive Metabolic Panel    Chronic kidney disease, stage 3a (Multi) N18.31     - Repeat CBC, CMP, urine microalbumin, urinalysis  -Medication list reviewed  -Counselled on low sodium diet and need to keep blood pressures well controlled  -Educated on avoidance of toxic medications such as NSAIDs (ibuprofen, Aleve, Motrin, diclofenac, Advil)  -Drink adequate quantities of water to remain hydrated.          Relevant Orders    Comprehensive Metabolic Panel    Unspecified osteoarthritis, unspecified site M19.90    Relevant Medications    diclofenac sodium (Voltaren) 1 % gel   RTC in  4 mo

## 2024-05-07 DIAGNOSIS — R25.2 CRAMP AND SPASM: ICD-10-CM

## 2024-05-07 LAB
ALBUMIN SERPL BCP-MCNC: 4.2 G/DL (ref 3.4–5)
ALP SERPL-CCNC: 69 U/L (ref 33–136)
ALT SERPL W P-5'-P-CCNC: 6 U/L (ref 7–45)
ANION GAP SERPL CALC-SCNC: 12 MMOL/L (ref 10–20)
AST SERPL W P-5'-P-CCNC: 10 U/L (ref 9–39)
BILIRUB SERPL-MCNC: 0.5 MG/DL (ref 0–1.2)
BUN SERPL-MCNC: 15 MG/DL (ref 6–23)
CALCIUM SERPL-MCNC: 9 MG/DL (ref 8.6–10.6)
CHLORIDE SERPL-SCNC: 101 MMOL/L (ref 98–107)
CHOLEST SERPL-MCNC: 172 MG/DL (ref 0–199)
CHOLESTEROL/HDL RATIO: 2.8
CO2 SERPL-SCNC: 27 MMOL/L (ref 21–32)
CREAT SERPL-MCNC: 1.2 MG/DL (ref 0.5–1.05)
EGFRCR SERPLBLD CKD-EPI 2021: 48 ML/MIN/1.73M*2
GLUCOSE SERPL-MCNC: 80 MG/DL (ref 74–99)
HDLC SERPL-MCNC: 61.3 MG/DL
LDLC SERPL CALC-MCNC: 90 MG/DL
NON HDL CHOLESTEROL: 111 MG/DL (ref 0–149)
POTASSIUM SERPL-SCNC: 4.2 MMOL/L (ref 3.5–5.3)
PROT SERPL-MCNC: 6.1 G/DL (ref 6.4–8.2)
SODIUM SERPL-SCNC: 136 MMOL/L (ref 136–145)
TRIGL SERPL-MCNC: 102 MG/DL (ref 0–149)
VLDL: 20 MG/DL (ref 0–40)

## 2024-05-10 RX ORDER — TIZANIDINE 4 MG/1
4 TABLET ORAL 3 TIMES DAILY PRN
Qty: 60 TABLET | Refills: 3 | Status: SHIPPED | OUTPATIENT
Start: 2024-05-10

## 2024-05-17 ENCOUNTER — APPOINTMENT (OUTPATIENT)
Dept: ORTHOPEDIC SURGERY | Facility: CLINIC | Age: 74
End: 2024-05-17
Payer: COMMERCIAL

## 2024-05-20 ENCOUNTER — TELEPHONE (OUTPATIENT)
Dept: CARDIOLOGY | Facility: CLINIC | Age: 74
End: 2024-05-20

## 2024-05-21 ENCOUNTER — APPOINTMENT (OUTPATIENT)
Dept: ORTHOPEDIC SURGERY | Facility: CLINIC | Age: 74
End: 2024-05-21
Payer: COMMERCIAL

## 2024-05-22 ENCOUNTER — PATIENT OUTREACH (OUTPATIENT)
Dept: PRIMARY CARE | Facility: CLINIC | Age: 74
End: 2024-05-22
Payer: COMMERCIAL

## 2024-05-22 NOTE — PROGRESS NOTES
Discharge Facility:[facility]  Discharge Diagnosis:Syncope and Collapse   Admission Date:5/19/24  Discharge Date: 5/21/24    PCP Appointment Date:Task to the office  Specialist Appointment Date: N/A  Hospital Encounter and Summary: Linked     Two attempts were made to reach patient within two business days after discharge. Voicemail left with contact information for patient to call back with any non-emergent questions or concerns.    Leyda Carlson LPN

## 2024-05-24 ENCOUNTER — OFFICE VISIT (OUTPATIENT)
Dept: CARDIOLOGY | Facility: CLINIC | Age: 74
End: 2024-05-24
Payer: COMMERCIAL

## 2024-05-24 ENCOUNTER — APPOINTMENT (OUTPATIENT)
Dept: CARDIOLOGY | Facility: CLINIC | Age: 74
End: 2024-05-24
Payer: COMMERCIAL

## 2024-05-24 VITALS
WEIGHT: 125 LBS | HEART RATE: 70 BPM | SYSTOLIC BLOOD PRESSURE: 140 MMHG | BODY MASS INDEX: 21.46 KG/M2 | DIASTOLIC BLOOD PRESSURE: 72 MMHG

## 2024-05-24 DIAGNOSIS — I25.10 ARTERIOSCLEROTIC HEART DISEASE: Primary | ICD-10-CM

## 2024-05-24 DIAGNOSIS — K21.9 GASTROESOPHAGEAL REFLUX DISEASE, UNSPECIFIED WHETHER ESOPHAGITIS PRESENT: ICD-10-CM

## 2024-05-24 DIAGNOSIS — R55 SYNCOPE AND COLLAPSE: ICD-10-CM

## 2024-05-24 DIAGNOSIS — E78.00 HYPERCHOLESTEROLEMIA: ICD-10-CM

## 2024-05-24 DIAGNOSIS — I10 PRIMARY HYPERTENSION: ICD-10-CM

## 2024-05-24 PROBLEM — Z86.79 HISTORY OF HYPERTENSION: Status: RESOLVED | Noted: 2024-02-05 | Resolved: 2024-05-24

## 2024-05-24 PROCEDURE — 1160F RVW MEDS BY RX/DR IN RCRD: CPT | Performed by: INTERNAL MEDICINE

## 2024-05-24 PROCEDURE — 99214 OFFICE O/P EST MOD 30 MIN: CPT | Performed by: INTERNAL MEDICINE

## 2024-05-24 PROCEDURE — 3077F SYST BP >= 140 MM HG: CPT | Performed by: INTERNAL MEDICINE

## 2024-05-24 PROCEDURE — 1159F MED LIST DOCD IN RCRD: CPT | Performed by: INTERNAL MEDICINE

## 2024-05-24 PROCEDURE — 3078F DIAST BP <80 MM HG: CPT | Performed by: INTERNAL MEDICINE

## 2024-05-24 PROCEDURE — 1126F AMNT PAIN NOTED NONE PRSNT: CPT | Performed by: INTERNAL MEDICINE

## 2024-05-24 PROCEDURE — 4004F PT TOBACCO SCREEN RCVD TLK: CPT | Performed by: INTERNAL MEDICINE

## 2024-05-24 RX ORDER — PANTOPRAZOLE SODIUM 40 MG/1
40 TABLET, DELAYED RELEASE ORAL
Qty: 90 TABLET | Refills: 3 | Status: SHIPPED | OUTPATIENT
Start: 2024-05-24 | End: 2025-05-19

## 2024-05-24 RX ORDER — AMLODIPINE BESYLATE 5 MG/1
5 TABLET ORAL DAILY
COMMUNITY

## 2024-05-24 ASSESSMENT — ENCOUNTER SYMPTOMS
PARESTHESIAS: 0
DYSURIA: 0
SHORTNESS OF BREATH: 0
PALPITATIONS: 0
DYSPNEA ON EXERTION: 0
LOSS OF SENSATION IN FEET: 0
NUMBNESS: 0
DEPRESSION: 0
COUGH: 0
ABDOMINAL PAIN: 0
BLURRED VISION: 0
OCCASIONAL FEELINGS OF UNSTEADINESS: 1
HEMATURIA: 0

## 2024-05-24 ASSESSMENT — PAIN SCALES - GENERAL: PAINLEVEL: 0-NO PAIN

## 2024-05-24 NOTE — PROGRESS NOTES
Randy Red is a 73 y.o. female.    Chief Complaint:  Hospital Follow-up    HPI  The patient had a syncopal episode and was admitted to [facility].  No obvious etiology was determined.  She underwent a myocardial perfusion stress test with no evidence of ischemia and preserved left ventricular function.  She feels well at this time other than a little tired from her hospitalization.  No chest pain no palpitations.    Review of Systems   Constitutional: Negative for malaise/fatigue.   HENT:  Negative for congestion.    Eyes:  Negative for blurred vision.   Cardiovascular:  Negative for chest pain, dyspnea on exertion and palpitations.   Respiratory:  Negative for cough and shortness of breath.    Musculoskeletal:  Negative for joint pain.   Gastrointestinal:  Negative for abdominal pain.   Genitourinary:  Negative for dysuria and hematuria.   Neurological:  Negative for numbness and paresthesias.       Objective   Constitutional:       Appearance: Not in distress.   Eyes:      Conjunctiva/sclera: Conjunctivae normal.   Neck:      Vascular: JVD normal.   Pulmonary:      Breath sounds: Normal breath sounds. No wheezing. No rhonchi. No rales.   Cardiovascular:      Normal rate. Regular rhythm.      Murmurs: There is no murmur.      No gallop.  No click. No rub.   Abdominal:      Palpations: Abdomen is soft.   Neurological:      General: No focal deficit present.      Mental Status: Alert.         Lab Review:   Lab on 05/06/2024   Component Date Value    Cholesterol 05/06/2024 172     HDL-Cholesterol 05/06/2024 61.3     Cholesterol/HDL Ratio 05/06/2024 2.8     LDL Calculated 05/06/2024 90     VLDL 05/06/2024 20     Triglycerides 05/06/2024 102     Non HDL Cholesterol 05/06/2024 111     Glucose 05/06/2024 80     Sodium 05/06/2024 136     Potassium 05/06/2024 4.2     Chloride 05/06/2024 101     Bicarbonate 05/06/2024 27     Anion Gap 05/06/2024 12     Urea Nitrogen 05/06/2024 15     Creatinine  05/06/2024 1.20 (H)     eGFR 05/06/2024 48 (L)     Calcium 05/06/2024 9.0     Albumin 05/06/2024 4.2     Alkaline Phosphatase 05/06/2024 69     Total Protein 05/06/2024 6.1 (L)     AST 05/06/2024 10     Bilirubin, Total 05/06/2024 0.5     ALT 05/06/2024 6 (L)        Assessment/Plan   The primary encounter diagnosis was Arteriosclerotic heart disease. Diagnoses of Primary hypertension and Hypercholesterolemia were also pertinent to this visit.    Syncope and collapse  The patient had an admission to [facility] after a syncopal episode.  The description is suggestive of a vasovagal reflex type syncopal episode.  Her potassium was just slightly depressed and she was taken off her diuretic therapy.  She had a negative myocardial perfusion stress test as well.  At this point we will just monitor on a clinical basis.  We discussed staying well-hydrated during the warm summer months and attempts to reduce the risk of a recurrent vasovagal type of an episode.    Arteriosclerotic heart disease  With negative myocardial perfusion stress test likely no underlying obstructive coronary disease.  Will continue just standard risk factor modification and will follow on a regular clinical basis.    Hypercholesterolemia  Last lipid panel was done while she was on atorvastatin.  She now has been transition to rosuvastatin therapy and we will recheck a lipid panel on her return visit.    Hypertension  Blood pressure is only mildly elevated at this time.  With a syncopal episode and mild lightheadedness symptoms I will not aggressively attempt to reduce blood pressure further at this time.  Stressed hygienic measures and recheck of blood pressure on return.  Consider adjusting therapy at that time.

## 2024-05-24 NOTE — ASSESSMENT & PLAN NOTE
The patient had an admission to [facility] after a syncopal episode.  The description is suggestive of a vasovagal reflex type syncopal episode.  Her potassium was just slightly depressed and she was taken off her diuretic therapy.  She had a negative myocardial perfusion stress test as well.  At this point we will just monitor on a clinical basis.  We discussed staying well-hydrated during the warm summer months and attempts to reduce the risk of a recurrent vasovagal type of an episode.

## 2024-05-24 NOTE — ASSESSMENT & PLAN NOTE
Last lipid panel was done while she was on atorvastatin.  She now has been transition to rosuvastatin therapy and we will recheck a lipid panel on her return visit.

## 2024-05-24 NOTE — ASSESSMENT & PLAN NOTE
With negative myocardial perfusion stress test likely no underlying obstructive coronary disease.  Will continue just standard risk factor modification and will follow on a regular clinical basis.

## 2024-05-24 NOTE — ASSESSMENT & PLAN NOTE
Blood pressure is only mildly elevated at this time.  With a syncopal episode and mild lightheadedness symptoms I will not aggressively attempt to reduce blood pressure further at this time.  Stressed hygienic measures and recheck of blood pressure on return.  Consider adjusting therapy at that time.

## 2024-06-03 PROBLEM — M25.511 PAIN IN RIGHT SHOULDER: Status: ACTIVE | Noted: 2023-04-10

## 2024-06-03 PROBLEM — R55 SYNCOPE AND COLLAPSE: Status: ACTIVE | Noted: 2024-05-19

## 2024-06-04 ENCOUNTER — APPOINTMENT (OUTPATIENT)
Dept: RADIOLOGY | Facility: HOSPITAL | Age: 74
DRG: 641 | End: 2024-06-04
Payer: COMMERCIAL

## 2024-06-04 ENCOUNTER — APPOINTMENT (OUTPATIENT)
Dept: CARDIOLOGY | Facility: HOSPITAL | Age: 74
DRG: 641 | End: 2024-06-04
Payer: COMMERCIAL

## 2024-06-04 ENCOUNTER — HOSPITAL ENCOUNTER (INPATIENT)
Facility: HOSPITAL | Age: 74
LOS: 2 days | Discharge: NURSING, REHAB OR LONG-TERM CARE | DRG: 641 | End: 2024-06-06
Attending: INTERNAL MEDICINE | Admitting: INTERNAL MEDICINE
Payer: COMMERCIAL

## 2024-06-04 DIAGNOSIS — G89.29 CHRONIC PAIN OF MULTIPLE JOINTS: ICD-10-CM

## 2024-06-04 DIAGNOSIS — M25.50 CHRONIC PAIN OF MULTIPLE JOINTS: ICD-10-CM

## 2024-06-04 DIAGNOSIS — E87.6 HYPOKALEMIA: ICD-10-CM

## 2024-06-04 DIAGNOSIS — G35 MULTIPLE SCLEROSIS (MULTI): ICD-10-CM

## 2024-06-04 DIAGNOSIS — R53.1 GENERALIZED WEAKNESS: Primary | ICD-10-CM

## 2024-06-04 LAB
ALBUMIN SERPL-MCNC: 3.8 G/DL (ref 3.5–5)
ALP BLD-CCNC: 79 U/L (ref 35–125)
ALT SERPL-CCNC: <5 U/L (ref 5–40)
AMPHETAMINES UR QL SCN>1000 NG/ML: NEGATIVE
ANION GAP SERPL CALC-SCNC: 18 MMOL/L
APPEARANCE UR: CLEAR
AST SERPL-CCNC: 10 U/L (ref 5–40)
ATRIAL RATE: 82 BPM
BACTERIA #/AREA URNS AUTO: ABNORMAL /HPF
BARBITURATES UR QL SCN>300 NG/ML: NEGATIVE
BASOPHILS # BLD AUTO: 0.05 X10*3/UL (ref 0–0.1)
BASOPHILS NFR BLD AUTO: 0.4 %
BENZODIAZ UR QL SCN>300 NG/ML: NEGATIVE
BILIRUB SERPL-MCNC: 0.9 MG/DL (ref 0.1–1.2)
BILIRUB UR STRIP.AUTO-MCNC: NEGATIVE MG/DL
BUN SERPL-MCNC: 9 MG/DL (ref 8–25)
BZE UR QL SCN>300 NG/ML: NEGATIVE
CALCIUM SERPL-MCNC: 9.5 MG/DL (ref 8.5–10.4)
CANNABINOIDS UR QL SCN>50 NG/ML: POSITIVE
CHLORIDE SERPL-SCNC: 98 MMOL/L (ref 97–107)
CK SERPL-CCNC: 53 U/L (ref 24–195)
CO2 SERPL-SCNC: 20 MMOL/L (ref 24–31)
COLOR UR: COLORLESS
CREAT SERPL-MCNC: 1.1 MG/DL (ref 0.4–1.6)
CRP SERPL-MCNC: 16.4 MG/DL (ref 0–2)
EGFRCR SERPLBLD CKD-EPI 2021: 53 ML/MIN/1.73M*2
EOSINOPHIL # BLD AUTO: 0.02 X10*3/UL (ref 0–0.4)
EOSINOPHIL NFR BLD AUTO: 0.2 %
ERYTHROCYTE [DISTWIDTH] IN BLOOD BY AUTOMATED COUNT: 14.9 % (ref 11.5–14.5)
ERYTHROCYTE [SEDIMENTATION RATE] IN BLOOD BY WESTERGREN METHOD: 90 MM/H (ref 0–30)
EST. AVERAGE GLUCOSE BLD GHB EST-MCNC: 108 MG/DL
FENTANYL+NORFENTANYL UR QL SCN: NEGATIVE
GLUCOSE SERPL-MCNC: 82 MG/DL (ref 65–99)
GLUCOSE UR STRIP.AUTO-MCNC: NORMAL MG/DL
HBA1C MFR BLD: 5.4 %
HCT VFR BLD AUTO: 34.5 % (ref 36–46)
HGB BLD-MCNC: 11.5 G/DL (ref 12–16)
IMM GRANULOCYTES # BLD AUTO: 0.04 X10*3/UL (ref 0–0.5)
IMM GRANULOCYTES NFR BLD AUTO: 0.3 % (ref 0–0.9)
KETONES UR STRIP.AUTO-MCNC: ABNORMAL MG/DL
LEUKOCYTE ESTERASE UR QL STRIP.AUTO: NEGATIVE
LYMPHOCYTES # BLD AUTO: 1.2 X10*3/UL (ref 0.8–3)
LYMPHOCYTES NFR BLD AUTO: 10.3 %
MAGNESIUM SERPL-MCNC: 1.7 MG/DL (ref 1.6–3.1)
MCH RBC QN AUTO: 27.8 PG (ref 26–34)
MCHC RBC AUTO-ENTMCNC: 33.3 G/DL (ref 32–36)
MCV RBC AUTO: 84 FL (ref 80–100)
METHADONE UR QL SCN>300 NG/ML: NEGATIVE
MONOCYTES # BLD AUTO: 0.9 X10*3/UL (ref 0.05–0.8)
MONOCYTES NFR BLD AUTO: 7.7 %
NEUTROPHILS # BLD AUTO: 9.49 X10*3/UL (ref 1.6–5.5)
NEUTROPHILS NFR BLD AUTO: 81.1 %
NITRITE UR QL STRIP.AUTO: NEGATIVE
NRBC BLD-RTO: 0 /100 WBCS (ref 0–0)
OPIATES UR QL SCN>300 NG/ML: NEGATIVE
OXYCODONE UR QL: NEGATIVE
P AXIS: 57 DEGREES
P OFFSET: 194 MS
P ONSET: 145 MS
PCP UR QL SCN>25 NG/ML: NEGATIVE
PH UR STRIP.AUTO: 6.5 [PH]
PLATELET # BLD AUTO: 299 X10*3/UL (ref 150–450)
POTASSIUM SERPL-SCNC: 2.8 MMOL/L (ref 3.4–5.1)
PR INTERVAL: 166 MS
PROT SERPL-MCNC: 7.1 G/DL (ref 5.9–7.9)
PROT UR STRIP.AUTO-MCNC: ABNORMAL MG/DL
Q ONSET: 228 MS
QRS COUNT: 14 BEATS
QRS DURATION: 84 MS
QT INTERVAL: 392 MS
QTC CALCULATION(BAZETT): 457 MS
QTC FREDERICIA: 435 MS
R AXIS: -3 DEGREES
RBC # BLD AUTO: 4.13 X10*6/UL (ref 4–5.2)
RBC # UR STRIP.AUTO: ABNORMAL /UL
RBC #/AREA URNS AUTO: ABNORMAL /HPF
SODIUM SERPL-SCNC: 136 MMOL/L (ref 133–145)
SP GR UR STRIP.AUTO: 1
SQUAMOUS #/AREA URNS AUTO: ABNORMAL /HPF
T AXIS: 4 DEGREES
T OFFSET: 424 MS
T4 FREE SERPL-MCNC: 1.4 NG/DL (ref 0.9–1.7)
TROPONIN T SERPL-MCNC: 17 NG/L
TROPONIN T SERPL-MCNC: 18 NG/L
TSH SERPL DL<=0.05 MIU/L-ACNC: 1.31 MIU/L (ref 0.27–4.2)
UROBILINOGEN UR STRIP.AUTO-MCNC: NORMAL MG/DL
VENTRICULAR RATE: 82 BPM
WBC # BLD AUTO: 11.7 X10*3/UL (ref 4.4–11.3)
WBC #/AREA URNS AUTO: ABNORMAL /HPF

## 2024-06-04 PROCEDURE — 2500000001 HC RX 250 WO HCPCS SELF ADMINISTERED DRUGS (ALT 637 FOR MEDICARE OP)

## 2024-06-04 PROCEDURE — 72170 X-RAY EXAM OF PELVIS: CPT | Performed by: RADIOLOGY

## 2024-06-04 PROCEDURE — 81001 URINALYSIS AUTO W/SCOPE: CPT

## 2024-06-04 PROCEDURE — 2500000004 HC RX 250 GENERAL PHARMACY W/ HCPCS (ALT 636 FOR OP/ED)

## 2024-06-04 PROCEDURE — 99291 CRITICAL CARE FIRST HOUR: CPT | Mod: 25

## 2024-06-04 PROCEDURE — 71045 X-RAY EXAM CHEST 1 VIEW: CPT

## 2024-06-04 PROCEDURE — 96366 THER/PROPH/DIAG IV INF ADDON: CPT

## 2024-06-04 PROCEDURE — 80053 COMPREHEN METABOLIC PANEL: CPT

## 2024-06-04 PROCEDURE — 36415 COLL VENOUS BLD VENIPUNCTURE: CPT

## 2024-06-04 PROCEDURE — 96360 HYDRATION IV INFUSION INIT: CPT

## 2024-06-04 PROCEDURE — 1200000002 HC GENERAL ROOM WITH TELEMETRY DAILY

## 2024-06-04 PROCEDURE — 83036 HEMOGLOBIN GLYCOSYLATED A1C: CPT

## 2024-06-04 PROCEDURE — 85025 COMPLETE CBC W/AUTO DIFF WBC: CPT

## 2024-06-04 PROCEDURE — 85652 RBC SED RATE AUTOMATED: CPT

## 2024-06-04 PROCEDURE — 96361 HYDRATE IV INFUSION ADD-ON: CPT

## 2024-06-04 PROCEDURE — 80307 DRUG TEST PRSMV CHEM ANLYZR: CPT

## 2024-06-04 PROCEDURE — 84439 ASSAY OF FREE THYROXINE: CPT

## 2024-06-04 PROCEDURE — 84484 ASSAY OF TROPONIN QUANT: CPT | Mod: 91

## 2024-06-04 PROCEDURE — 87086 URINE CULTURE/COLONY COUNT: CPT | Mod: WESLAB | Performed by: CLINICAL NURSE SPECIALIST

## 2024-06-04 PROCEDURE — 86140 C-REACTIVE PROTEIN: CPT

## 2024-06-04 PROCEDURE — 84443 ASSAY THYROID STIM HORMONE: CPT

## 2024-06-04 PROCEDURE — 84484 ASSAY OF TROPONIN QUANT: CPT

## 2024-06-04 PROCEDURE — 93005 ELECTROCARDIOGRAM TRACING: CPT

## 2024-06-04 PROCEDURE — 72170 X-RAY EXAM OF PELVIS: CPT

## 2024-06-04 PROCEDURE — 96365 THER/PROPH/DIAG IV INF INIT: CPT

## 2024-06-04 PROCEDURE — 71045 X-RAY EXAM CHEST 1 VIEW: CPT | Performed by: STUDENT IN AN ORGANIZED HEALTH CARE EDUCATION/TRAINING PROGRAM

## 2024-06-04 PROCEDURE — 83735 ASSAY OF MAGNESIUM: CPT

## 2024-06-04 PROCEDURE — 82550 ASSAY OF CK (CPK): CPT

## 2024-06-04 RX ORDER — PANTOPRAZOLE SODIUM 40 MG/1
40 TABLET, DELAYED RELEASE ORAL DAILY
Status: DISCONTINUED | OUTPATIENT
Start: 2024-06-05 | End: 2024-06-06 | Stop reason: HOSPADM

## 2024-06-04 RX ORDER — AMLODIPINE BESYLATE 5 MG/1
5 TABLET ORAL DAILY
Status: DISCONTINUED | OUTPATIENT
Start: 2024-06-04 | End: 2024-06-06 | Stop reason: HOSPADM

## 2024-06-04 RX ORDER — POTASSIUM CHLORIDE 14.9 MG/ML
20 INJECTION INTRAVENOUS ONCE
Status: COMPLETED | OUTPATIENT
Start: 2024-06-04 | End: 2024-06-04

## 2024-06-04 RX ORDER — HEPARIN SODIUM 5000 [USP'U]/ML
5000 INJECTION, SOLUTION INTRAVENOUS; SUBCUTANEOUS EVERY 12 HOURS
Status: DISCONTINUED | OUTPATIENT
Start: 2024-06-04 | End: 2024-06-06 | Stop reason: HOSPADM

## 2024-06-04 RX ORDER — DOCUSATE SODIUM 100 MG/1
100 CAPSULE, LIQUID FILLED ORAL 2 TIMES DAILY PRN
Status: DISCONTINUED | OUTPATIENT
Start: 2024-06-04 | End: 2024-06-04

## 2024-06-04 RX ORDER — ASPIRIN 81 MG/1
81 TABLET ORAL DAILY
Status: DISCONTINUED | OUTPATIENT
Start: 2024-06-04 | End: 2024-06-06 | Stop reason: HOSPADM

## 2024-06-04 RX ORDER — CHOLECALCIFEROL (VITAMIN D3) 25 MCG
1000 TABLET ORAL DAILY
Status: DISCONTINUED | OUTPATIENT
Start: 2024-06-04 | End: 2024-06-06 | Stop reason: HOSPADM

## 2024-06-04 RX ORDER — DOCUSATE SODIUM 100 MG/1
100 CAPSULE, LIQUID FILLED ORAL 2 TIMES DAILY
Status: DISCONTINUED | OUTPATIENT
Start: 2024-06-04 | End: 2024-06-06 | Stop reason: HOSPADM

## 2024-06-04 RX ORDER — SODIUM CHLORIDE AND POTASSIUM CHLORIDE 150; 900 MG/100ML; MG/100ML
75 INJECTION, SOLUTION INTRAVENOUS CONTINUOUS
Status: DISCONTINUED | OUTPATIENT
Start: 2024-06-04 | End: 2024-06-05

## 2024-06-04 RX ORDER — TRAMADOL HYDROCHLORIDE 50 MG/1
50 TABLET ORAL ONCE
Status: COMPLETED | OUTPATIENT
Start: 2024-06-04 | End: 2024-06-04

## 2024-06-04 RX ORDER — TERIPARATIDE 250 UG/ML
20 INJECTION, SOLUTION SUBCUTANEOUS DAILY
Status: DISCONTINUED | OUTPATIENT
Start: 2024-06-04 | End: 2024-06-04

## 2024-06-04 RX ORDER — LEVOTHYROXINE SODIUM 50 UG/1
50 TABLET ORAL DAILY
Status: DISCONTINUED | OUTPATIENT
Start: 2024-06-05 | End: 2024-06-06 | Stop reason: HOSPADM

## 2024-06-04 RX ORDER — ONDANSETRON 4 MG/1
4 TABLET, FILM COATED ORAL EVERY 6 HOURS PRN
Status: DISCONTINUED | OUTPATIENT
Start: 2024-06-04 | End: 2024-06-06 | Stop reason: HOSPADM

## 2024-06-04 RX ORDER — VENLAFAXINE 25 MG/1
25 TABLET ORAL 2 TIMES DAILY
Status: DISCONTINUED | OUTPATIENT
Start: 2024-06-04 | End: 2024-06-06 | Stop reason: HOSPADM

## 2024-06-04 RX ORDER — ALBUTEROL SULFATE 0.83 MG/ML
2.5 SOLUTION RESPIRATORY (INHALATION) EVERY 6 HOURS PRN
Status: DISCONTINUED | OUTPATIENT
Start: 2024-06-04 | End: 2024-06-06 | Stop reason: HOSPADM

## 2024-06-04 RX ORDER — METOPROLOL SUCCINATE 25 MG/1
25 TABLET, EXTENDED RELEASE ORAL DAILY
Status: DISCONTINUED | OUTPATIENT
Start: 2024-06-04 | End: 2024-06-06 | Stop reason: HOSPADM

## 2024-06-04 RX ORDER — POTASSIUM CHLORIDE 1.5 G/1.58G
40 POWDER, FOR SOLUTION ORAL ONCE
Status: COMPLETED | OUTPATIENT
Start: 2024-06-04 | End: 2024-06-04

## 2024-06-04 RX ORDER — POLYETHYLENE GLYCOL 3350 17 G/17G
17 POWDER, FOR SOLUTION ORAL DAILY
Status: DISCONTINUED | OUTPATIENT
Start: 2024-06-04 | End: 2024-06-05

## 2024-06-04 RX ORDER — TRAMADOL HYDROCHLORIDE 50 MG/1
50 TABLET ORAL EVERY 8 HOURS PRN
Status: DISCONTINUED | OUTPATIENT
Start: 2024-06-04 | End: 2024-06-06 | Stop reason: HOSPADM

## 2024-06-04 RX ADMIN — POTASSIUM CHLORIDE 20 MEQ: 14.9 INJECTION, SOLUTION INTRAVENOUS at 15:35

## 2024-06-04 RX ADMIN — METOPROLOL SUCCINATE 25 MG: 25 TABLET, EXTENDED RELEASE ORAL at 21:54

## 2024-06-04 RX ADMIN — POTASSIUM CHLORIDE 40 MEQ: 1.5 FOR SOLUTION ORAL at 15:32

## 2024-06-04 RX ADMIN — ASPIRIN 81 MG: 81 TABLET, COATED ORAL at 21:54

## 2024-06-04 RX ADMIN — POTASSIUM CHLORIDE 20 MEQ: 14.9 INJECTION, SOLUTION INTRAVENOUS at 20:58

## 2024-06-04 RX ADMIN — CHOLECALCIFEROL TAB 25 MCG (1000 UNIT) 1000 UNITS: 25 TAB at 21:54

## 2024-06-04 RX ADMIN — AMLODIPINE BESYLATE 5 MG: 5 TABLET ORAL at 21:55

## 2024-06-04 RX ADMIN — VENLAFAXINE 25 MG: 25 TABLET ORAL at 21:55

## 2024-06-04 RX ADMIN — TRAMADOL HYDROCHLORIDE 50 MG: 50 TABLET, COATED ORAL at 14:21

## 2024-06-04 ASSESSMENT — ENCOUNTER SYMPTOMS
EYES NEGATIVE: 1
BACK PAIN: 1
ENDOCRINE NEGATIVE: 1
GASTROINTESTINAL NEGATIVE: 1
PSYCHIATRIC NEGATIVE: 1
FATIGUE: 1
CARDIOVASCULAR NEGATIVE: 1
ALLERGIC/IMMUNOLOGIC NEGATIVE: 1
ARTHRALGIAS: 1
RESPIRATORY NEGATIVE: 1
ACTIVITY CHANGE: 1
HEMATOLOGIC/LYMPHATIC NEGATIVE: 1

## 2024-06-04 ASSESSMENT — PAIN DESCRIPTION - LOCATION: LOCATION: BACK

## 2024-06-04 ASSESSMENT — PAIN DESCRIPTION - PAIN TYPE: TYPE: ACUTE PAIN

## 2024-06-04 ASSESSMENT — LIFESTYLE VARIABLES
TOTAL SCORE: 0
HAVE YOU EVER FELT YOU SHOULD CUT DOWN ON YOUR DRINKING: NO
EVER FELT BAD OR GUILTY ABOUT YOUR DRINKING: NO
EVER HAD A DRINK FIRST THING IN THE MORNING TO STEADY YOUR NERVES TO GET RID OF A HANGOVER: NO
HAVE PEOPLE ANNOYED YOU BY CRITICIZING YOUR DRINKING: NO

## 2024-06-04 ASSESSMENT — PAIN - FUNCTIONAL ASSESSMENT: PAIN_FUNCTIONAL_ASSESSMENT: 0-10

## 2024-06-04 ASSESSMENT — PAIN SCALES - GENERAL
PAINLEVEL_OUTOF10: 10 - WORST POSSIBLE PAIN
PAINLEVEL_OUTOF10: 10 - WORST POSSIBLE PAIN
PAINLEVEL_OUTOF10: 7

## 2024-06-04 NOTE — ED TRIAGE NOTES
TRIAGE NOTE   I saw the patient as the Clinician in Triage and performed a brief history and physical exam, established acuity, and ordered appropriate tests to develop basic plan of care. Patient will be seen by an KELLE, resident and/or physician who will independently evaluate the patient. Please see subsequent provider notes for further details and disposition.     Brief HPI: In brief, Indu Red is a 73 y.o. female that presents for body aches and weakness.  Patient states she fell on 5/19/2024 and was hospitalized at another hospital.  She states since then she has had allover body aches and difficulty caring for herself at home.  She states she lives with her elderly mother and has not gotten out of bed in 3 days.  She states she has not eaten or drink much either as she has not had an appetite.  She is complaining of pain in her right shoulder, hips, and bilateral legs.  She states she takes tramadol occasionally at home for the pain however has not taken anything today.  She denies any further falls or injuries.  She denies chest pain, shortness of breath, fever, chills, nausea, vomiting, abdominal pain, cough, congestion, headaches, numbness, tingling, hematuria, dysuria, constipation, diarrhea.     Focused Physical exam:   GENERAL APPEARANCE: Awake and alert.  Patient is moaning in pain saying that she has pain all over her body.    VITAL SIGNS: As per the nurses' triage record.    HEENT: Normocephalic, atraumatic. Extraocular muscles are intact. Pupils equal round and reactive to light.  Dry mucous membranes.    CHEST: Clear to auscultation bilaterally.     HEART: Regular rate and rhythm.     MUSCULOSKELETAL: Moving all extremities     NEUROLOGICAL: Awake, alert and oriented x 3.     DERM: Warm and dry.  No rashes.      Plan/MDM:   Patient is a 73-year-old female presenting to the emergency department for evaluation of allover body aches and weakness.  Generalized workup ordered including CBC, CMP,  troponin, chest x-ray, EKG.  Tramadol also ordered for pain as well as 500 mL normal saline.  Patient is interested in being admitted for rehab placement.    Please see subsequent provider note for further details and disposition

## 2024-06-04 NOTE — H&P
"History Of Present Illness  Indu Red is a 73 y.o. female presenting with weakness.    73-year-old female presents to [facility] for chief complaint of generalized weakness.  Present bedside is the patient's adult daughter.    Patient states that on May 19, she had a syncopal episode at home that resulted in fall.  She sought medical treatment at an outside facility, where workup with cardiology and neurology was negative.  She was subsequently discharged home in stable condition.  However, patient states that she has \"not really felt the same\" since this incident.  She states that over the last 3-4 days she has felt particularly weak and fatigued and has been unable to leave her bed for the last 3 days.  She also reports an overall lack of intake and energy.    She states that she is consistent with her medications and takes them as prescribed.  She denies recent illness or travel.  She denies sick contacts.  Denies chest pain or shortness of breath.  Reports nausea but denies vomiting, denies diarrhea.  She reports that she has not had a bowel movement in 3 days.  She does have a history of multiple sclerosis, but she states it has been in remission for over a year.    Patient will be admitted to the hospital service for further evaluation and treatment.       Past Medical History  Past Medical History:   Diagnosis Date    Anemia     Esophagitis     Gastroesophageal reflux disease due to diaphragmatic hernia     Goiter     Hypertension     Left shoulder pain     Multiple sclerosis (Multi)     Personal history of other diseases of the female genital tract 08/03/2016    History of ovarian cyst    Personal history of other diseases of the respiratory system 01/19/2017    History of acute bronchitis    Personal history of other specified conditions 08/03/2016    History of wheezing    Postoperative urinary retention     Shoulder pain, right     Ulcer of esophagus without bleeding     Esophageal " erosions       Surgical History  Past Surgical History:   Procedure Laterality Date    APPENDECTOMY  08/03/2016    COLONOSCOPY  08/03/2016    With polypectomy    ESOPHAGOGASTRODUODENOSCOPY      FOOT SURGERY Left     MR HEAD ANGIO WO IV CONTRAST  07/21/2023    MR HEAD ANGIO WO IV CONTRAST 7/21/2023 [facility]    MR NECK ANGIO WO IV CONTRAST  07/21/2023    MR NECK ANGIO WO IV CONTRAST 7/21/2023 [facility] MRI    OOPHORECTOMY  08/03/2016    OTHER SURGICAL HISTORY  08/03/2016    Cervical Conization    SALPINGECTOMY      SHOULDER SURGERY Right 02/13/2024    TEMPOROMANDIBULAR JOINT SURGERY      THYROIDECTOMY, PARTIAL      Substernal    TOTAL VAGINAL HYSTERECTOMY  03/09/2017        Social History  She reports that she has been smoking cigarettes. She has a 12.5 pack-year smoking history. She has been exposed to tobacco smoke. She has never used smokeless tobacco. She reports that she does not currently use alcohol. She reports current drug use. Frequency: 1.00 time per week. Drug: Marijuana.    Family History  Family History   Problem Relation Name Age of Onset    Other (PCI) Mother  80 - 89        Stent    Heart disease Mother      Hypertension Mother      Stomach cancer Father          Allergies  Other, Tetracyclines, Oxycodone, Oxycodone-acetaminophen, Penicillins, Pseudoephedrine, Acetaminophen-codeine, Codeine, Psyllium, and Sulfa (sulfonamide antibiotics)    Review of Systems   Constitutional:  Positive for activity change and fatigue.   HENT: Negative.     Eyes: Negative.    Respiratory: Negative.     Cardiovascular: Negative.    Gastrointestinal: Negative.    Endocrine: Negative.    Genitourinary: Negative.    Musculoskeletal:  Positive for arthralgias, back pain and gait problem.   Skin: Negative.    Allergic/Immunologic: Negative.    Hematological: Negative.    Psychiatric/Behavioral: Negative.     All other systems reviewed and are negative.       Physical Exam  Vitals and nursing note reviewed.   Constitutional:        "Appearance: Normal appearance.   HENT:      Head: Normocephalic and atraumatic.      Mouth/Throat:      Mouth: Mucous membranes are moist.   Eyes:      Extraocular Movements: Extraocular movements intact.      Pupils: Pupils are equal, round, and reactive to light.   Cardiovascular:      Rate and Rhythm: Normal rate and regular rhythm.      Pulses: Normal pulses.      Heart sounds: Normal heart sounds.   Pulmonary:      Effort: Pulmonary effort is normal.      Breath sounds: Normal breath sounds.   Abdominal:      General: Bowel sounds are normal.      Palpations: Abdomen is soft.   Musculoskeletal:         General: Normal range of motion.      Cervical back: Normal range of motion and neck supple.      Comments: Nonspecific generalized lower back pain  Status post right rotator cuff repair   Skin:     General: Skin is warm and dry.      Capillary Refill: Capillary refill takes less than 2 seconds.   Neurological:      General: No focal deficit present.      Mental Status: She is alert and oriented to person, place, and time.   Psychiatric:         Mood and Affect: Mood normal.         Behavior: Behavior normal.          Last Recorded Vitals  Blood pressure 114/66, pulse 79, temperature 36.7 °C (98.1 °F), resp. rate (!) 22, height 1.6 m (5' 3\"), weight 59 kg (130 lb), SpO2 98%.    Relevant Results        Results for orders placed or performed during the hospital encounter of 06/04/24 (from the past 24 hour(s))   CBC and Auto Differential   Result Value Ref Range    WBC 11.7 (H) 4.4 - 11.3 x10*3/uL    nRBC 0.0 0.0 - 0.0 /100 WBCs    RBC 4.13 4.00 - 5.20 x10*6/uL    Hemoglobin 11.5 (L) 12.0 - 16.0 g/dL    Hematocrit 34.5 (L) 36.0 - 46.0 %    MCV 84 80 - 100 fL    MCH 27.8 26.0 - 34.0 pg    MCHC 33.3 32.0 - 36.0 g/dL    RDW 14.9 (H) 11.5 - 14.5 %    Platelets 299 150 - 450 x10*3/uL    Neutrophils % 81.1 40.0 - 80.0 %    Immature Granulocytes %, Automated 0.3 0.0 - 0.9 %    Lymphocytes % 10.3 13.0 - 44.0 %    Monocytes " % 7.7 2.0 - 10.0 %    Eosinophils % 0.2 0.0 - 6.0 %    Basophils % 0.4 0.0 - 2.0 %    Neutrophils Absolute 9.49 (H) 1.60 - 5.50 x10*3/uL    Immature Granulocytes Absolute, Automated 0.04 0.00 - 0.50 x10*3/uL    Lymphocytes Absolute 1.20 0.80 - 3.00 x10*3/uL    Monocytes Absolute 0.90 (H) 0.05 - 0.80 x10*3/uL    Eosinophils Absolute 0.02 0.00 - 0.40 x10*3/uL    Basophils Absolute 0.05 0.00 - 0.10 x10*3/uL   Magnesium   Result Value Ref Range    Magnesium 1.70 1.60 - 3.10 mg/dL   Comprehensive metabolic panel   Result Value Ref Range    Glucose 82 65 - 99 mg/dL    Sodium 136 133 - 145 mmol/L    Potassium 2.8 (LL) 3.4 - 5.1 mmol/L    Chloride 98 97 - 107 mmol/L    Bicarbonate 20 (L) 24 - 31 mmol/L    Urea Nitrogen 9 8 - 25 mg/dL    Creatinine 1.10 0.40 - 1.60 mg/dL    eGFR 53 (L) >60 mL/min/1.73m*2    Calcium 9.5 8.5 - 10.4 mg/dL    Albumin 3.8 3.5 - 5.0 g/dL    Alkaline Phosphatase 79 35 - 125 U/L    Total Protein 7.1 5.9 - 7.9 g/dL    AST 10 5 - 40 U/L    Bilirubin, Total 0.9 0.1 - 1.2 mg/dL    ALT <5 (L) 5 - 40 U/L    Anion Gap 18 <=19 mmol/L   Serial Troponin, Initial ([facility])   Result Value Ref Range    Troponin T, High Sensitivity 17 (HH) <=14 ng/L   ECG 12 lead   Result Value Ref Range    Ventricular Rate 82 BPM    Atrial Rate 82 BPM    AZ Interval 166 ms    QRS Duration 84 ms    QT Interval 392 ms    QTC Calculation(Bazett) 457 ms    P Axis 57 degrees    R Axis -3 degrees    T Axis 4 degrees    QRS Count 14 beats    Q Onset 228 ms    P Onset 145 ms    P Offset 194 ms    T Offset 424 ms    QTC Fredericia 435 ms   Urinalysis with Reflex Culture and Microscopic   Result Value Ref Range    Color, Urine Colorless (N) Light-Yellow, Yellow, Dark-Yellow    Appearance, Urine Clear Clear    Specific Gravity, Urine 1.005 1.005 - 1.035    pH, Urine 6.5 5.0, 5.5, 6.0, 6.5, 7.0, 7.5, 8.0    Protein, Urine 30 (1+) (A) NEGATIVE, 10 (TRACE), 20 (TRACE) mg/dL    Glucose, Urine Normal Normal mg/dL    Blood, Urine 0.5 (2+) (A)  NEGATIVE    Ketones, Urine 10 (1+) (A) NEGATIVE mg/dL    Bilirubin, Urine NEGATIVE NEGATIVE    Urobilinogen, Urine Normal Normal mg/dL    Nitrite, Urine NEGATIVE NEGATIVE    Leukocyte Esterase, Urine NEGATIVE NEGATIVE   Urinalysis Microscopic   Result Value Ref Range    WBC, Urine 1-5 1-5, NONE /HPF    RBC, Urine 11-20 (A) NONE, 1-2, 3-5 /HPF    Squamous Epithelial Cells, Urine 1-9 (SPARSE) Reference range not established. /HPF    Bacteria, Urine 1+ (A) NONE SEEN /HPF   Serial Troponin, 2 Hour ([facility])   Result Value Ref Range    Troponin T, High Sensitivity 18 (HH) <=14 ng/L         Assessment/Plan   Principal Problem:    Generalized weakness    Hypokalemia  2.8 on admission, replace with IV potassium  Recheck BMP in the a.m.  Hold home hydrochlorothiazide  Monitor on telemetry    Generalized weakness  Chronic Pain  Does have history of MS, per patient that is in remission  Potassium replacement  IV fluids with supplemental potassium  Hold home statin for now  Have ordered CK, CRP, sed rate  PRN tramadol   PT OT  Falls precautions  Encourage p.o. intake    Mild leukocytosis  Likely reactive.  Afebrile.  Vital signs stable.  Recheck CBC in a.m.    Mildly elevated troponin  Flat pattern.  Denies chest pain.  EKG NSR    History of MS  Per patient, currently in remission for over a year    History of hypertension  Continue home amlodipine, hold home lisinopril/hydrochlorothiazide  Monitor vital signs    History of hypothyroid  Continue home Synthroid  Have ordered TSH and free T4    History of depression  Continue home Effexor    History of GERD  Continue home PPI    History of CAD, HLD  Continue home ASA, hold statin for now    Nicotine dependence  Has been counseled on cessation  Has refused nicotine patch    DVT/GI  Heparin subcu, SCDs, PPI    Dispo: Patient is open to placement.           I spent 58 minutes in the professional and overall care of this patient.      Corinna Luna, APRN-CNP

## 2024-06-04 NOTE — ED TRIAGE NOTES
Patient presents to the emergency department with back pain. Patient states that she fell last month and was hospitalized 05/19. Patient states she can't walk now because of back pain that she has.

## 2024-06-05 LAB
ANION GAP SERPL CALC-SCNC: 12 MMOL/L
BASOPHILS # BLD AUTO: 0.03 X10*3/UL (ref 0–0.1)
BASOPHILS NFR BLD AUTO: 0.3 %
BUN SERPL-MCNC: 7 MG/DL (ref 8–25)
CALCIUM SERPL-MCNC: 8.7 MG/DL (ref 8.5–10.4)
CHLORIDE SERPL-SCNC: 107 MMOL/L (ref 97–107)
CO2 SERPL-SCNC: 20 MMOL/L (ref 24–31)
CREAT SERPL-MCNC: 0.9 MG/DL (ref 0.4–1.6)
EGFRCR SERPLBLD CKD-EPI 2021: 68 ML/MIN/1.73M*2
EOSINOPHIL # BLD AUTO: 0.05 X10*3/UL (ref 0–0.4)
EOSINOPHIL NFR BLD AUTO: 0.5 %
ERYTHROCYTE [DISTWIDTH] IN BLOOD BY AUTOMATED COUNT: 14.6 % (ref 11.5–14.5)
GLUCOSE SERPL-MCNC: 106 MG/DL (ref 65–99)
HCT VFR BLD AUTO: 30.1 % (ref 36–46)
HGB BLD-MCNC: 9.8 G/DL (ref 12–16)
HOLD SPECIMEN: NORMAL
IMM GRANULOCYTES # BLD AUTO: 0.04 X10*3/UL (ref 0–0.5)
IMM GRANULOCYTES NFR BLD AUTO: 0.4 % (ref 0–0.9)
LYMPHOCYTES # BLD AUTO: 1.26 X10*3/UL (ref 0.8–3)
LYMPHOCYTES NFR BLD AUTO: 12.6 %
MCH RBC QN AUTO: 27.5 PG (ref 26–34)
MCHC RBC AUTO-ENTMCNC: 32.6 G/DL (ref 32–36)
MCV RBC AUTO: 84 FL (ref 80–100)
MONOCYTES # BLD AUTO: 1.08 X10*3/UL (ref 0.05–0.8)
MONOCYTES NFR BLD AUTO: 10.8 %
NEUTROPHILS # BLD AUTO: 7.54 X10*3/UL (ref 1.6–5.5)
NEUTROPHILS NFR BLD AUTO: 75.4 %
NRBC BLD-RTO: 0 /100 WBCS (ref 0–0)
PLATELET # BLD AUTO: 290 X10*3/UL (ref 150–450)
POTASSIUM SERPL-SCNC: 3.7 MMOL/L (ref 3.4–5.1)
RBC # BLD AUTO: 3.57 X10*6/UL (ref 4–5.2)
SODIUM SERPL-SCNC: 139 MMOL/L (ref 133–145)
TROPONIN T SERPL-MCNC: 15 NG/L
WBC # BLD AUTO: 10 X10*3/UL (ref 4.4–11.3)

## 2024-06-05 PROCEDURE — 2500000004 HC RX 250 GENERAL PHARMACY W/ HCPCS (ALT 636 FOR OP/ED)

## 2024-06-05 PROCEDURE — 97535 SELF CARE MNGMENT TRAINING: CPT | Mod: GO

## 2024-06-05 PROCEDURE — 1200000002 HC GENERAL ROOM WITH TELEMETRY DAILY

## 2024-06-05 PROCEDURE — 97116 GAIT TRAINING THERAPY: CPT | Mod: GP

## 2024-06-05 PROCEDURE — 85025 COMPLETE CBC W/AUTO DIFF WBC: CPT

## 2024-06-05 PROCEDURE — 84484 ASSAY OF TROPONIN QUANT: CPT

## 2024-06-05 PROCEDURE — 2500000001 HC RX 250 WO HCPCS SELF ADMINISTERED DRUGS (ALT 637 FOR MEDICARE OP)

## 2024-06-05 PROCEDURE — 97165 OT EVAL LOW COMPLEX 30 MIN: CPT | Mod: GO

## 2024-06-05 PROCEDURE — 80048 BASIC METABOLIC PNL TOTAL CA: CPT

## 2024-06-05 PROCEDURE — 97161 PT EVAL LOW COMPLEX 20 MIN: CPT | Mod: GP

## 2024-06-05 PROCEDURE — 36415 COLL VENOUS BLD VENIPUNCTURE: CPT

## 2024-06-05 PROCEDURE — 2500000002 HC RX 250 W HCPCS SELF ADMINISTERED DRUGS (ALT 637 FOR MEDICARE OP, ALT 636 FOR OP/ED): Mod: MUE

## 2024-06-05 RX ORDER — LISINOPRIL 5 MG/1
5 TABLET ORAL DAILY
Status: DISCONTINUED | OUTPATIENT
Start: 2024-06-05 | End: 2024-06-06 | Stop reason: HOSPADM

## 2024-06-05 RX ORDER — POTASSIUM CHLORIDE 20 MEQ/1
20 TABLET, EXTENDED RELEASE ORAL ONCE
Status: COMPLETED | OUTPATIENT
Start: 2024-06-05 | End: 2024-06-05

## 2024-06-05 RX ORDER — IBUPROFEN 200 MG
1 TABLET ORAL DAILY
Status: DISCONTINUED | OUTPATIENT
Start: 2024-06-05 | End: 2024-06-06 | Stop reason: HOSPADM

## 2024-06-05 RX ORDER — POLYETHYLENE GLYCOL 3350 17 G/17G
17 POWDER, FOR SOLUTION ORAL 2 TIMES DAILY
Status: DISCONTINUED | OUTPATIENT
Start: 2024-06-05 | End: 2024-06-06 | Stop reason: HOSPADM

## 2024-06-05 RX ADMIN — TRAMADOL HYDROCHLORIDE 50 MG: 50 TABLET, COATED ORAL at 00:14

## 2024-06-05 RX ADMIN — HEPARIN SODIUM 5000 UNITS: 5000 INJECTION, SOLUTION INTRAVENOUS; SUBCUTANEOUS at 00:12

## 2024-06-05 RX ADMIN — VENLAFAXINE 25 MG: 25 TABLET ORAL at 22:25

## 2024-06-05 RX ADMIN — DOCUSATE SODIUM 100 MG: 100 CAPSULE, LIQUID FILLED ORAL at 09:10

## 2024-06-05 RX ADMIN — VENLAFAXINE 25 MG: 25 TABLET ORAL at 09:12

## 2024-06-05 RX ADMIN — AMLODIPINE BESYLATE 5 MG: 5 TABLET ORAL at 09:10

## 2024-06-05 RX ADMIN — SODIUM CHLORIDE AND POTASSIUM CHLORIDE 75 ML/HR: 9; 1.49 INJECTION, SOLUTION INTRAVENOUS at 00:33

## 2024-06-05 RX ADMIN — POLYETHYLENE GLYCOL 3350 17 G: 17 POWDER, FOR SOLUTION ORAL at 00:12

## 2024-06-05 RX ADMIN — LEVOTHYROXINE SODIUM 50 MCG: 0.05 TABLET ORAL at 07:31

## 2024-06-05 RX ADMIN — CHOLECALCIFEROL TAB 25 MCG (1000 UNIT) 1000 UNITS: 25 TAB at 09:11

## 2024-06-05 RX ADMIN — HEPARIN SODIUM 5000 UNITS: 5000 INJECTION, SOLUTION INTRAVENOUS; SUBCUTANEOUS at 09:10

## 2024-06-05 RX ADMIN — METOPROLOL SUCCINATE 25 MG: 25 TABLET, EXTENDED RELEASE ORAL at 09:11

## 2024-06-05 RX ADMIN — PANTOPRAZOLE SODIUM 40 MG: 40 TABLET, DELAYED RELEASE ORAL at 09:11

## 2024-06-05 RX ADMIN — TRAMADOL HYDROCHLORIDE 50 MG: 50 TABLET, COATED ORAL at 22:24

## 2024-06-05 RX ADMIN — POTASSIUM CHLORIDE 20 MEQ: 1500 TABLET, EXTENDED RELEASE ORAL at 09:11

## 2024-06-05 RX ADMIN — HEPARIN SODIUM 5000 UNITS: 5000 INJECTION, SOLUTION INTRAVENOUS; SUBCUTANEOUS at 22:25

## 2024-06-05 RX ADMIN — ASPIRIN 81 MG: 81 TABLET, COATED ORAL at 09:10

## 2024-06-05 RX ADMIN — DOCUSATE SODIUM 100 MG: 100 CAPSULE, LIQUID FILLED ORAL at 00:11

## 2024-06-05 RX ADMIN — LISINOPRIL 5 MG: 5 TABLET ORAL at 13:52

## 2024-06-05 RX ADMIN — POLYETHYLENE GLYCOL 3350 17 G: 17 POWDER, FOR SOLUTION ORAL at 22:25

## 2024-06-05 SDOH — ECONOMIC STABILITY: FOOD INSECURITY: WITHIN THE PAST 12 MONTHS, THE FOOD YOU BOUGHT JUST DIDN'T LAST AND YOU DIDN'T HAVE MONEY TO GET MORE.: NEVER TRUE

## 2024-06-05 SDOH — SOCIAL STABILITY: SOCIAL INSECURITY: DO YOU FEEL ANYONE HAS EXPLOITED OR TAKEN ADVANTAGE OF YOU FINANCIALLY OR OF YOUR PERSONAL PROPERTY?: NO

## 2024-06-05 SDOH — HEALTH STABILITY: PHYSICAL HEALTH: ON AVERAGE, HOW MANY DAYS PER WEEK DO YOU ENGAGE IN MODERATE TO STRENUOUS EXERCISE (LIKE A BRISK WALK)?: 7 DAYS

## 2024-06-05 SDOH — SOCIAL STABILITY: SOCIAL INSECURITY
WITHIN THE LAST YEAR, HAVE YOU BEEN KICKED, HIT, SLAPPED, OR OTHERWISE PHYSICALLY HURT BY YOUR PARTNER OR EX-PARTNER?: NO

## 2024-06-05 SDOH — SOCIAL STABILITY: SOCIAL INSECURITY: HAVE YOU HAD THOUGHTS OF HARMING ANYONE ELSE?: NO

## 2024-06-05 SDOH — ECONOMIC STABILITY: INCOME INSECURITY: IN THE LAST 12 MONTHS, WAS THERE A TIME WHEN YOU WERE NOT ABLE TO PAY THE MORTGAGE OR RENT ON TIME?: NO

## 2024-06-05 SDOH — ECONOMIC STABILITY: HOUSING INSECURITY
IN THE LAST 12 MONTHS, WAS THERE A TIME WHEN YOU DID NOT HAVE A STEADY PLACE TO SLEEP OR SLEPT IN A SHELTER (INCLUDING NOW)?: NO

## 2024-06-05 SDOH — ECONOMIC STABILITY: FOOD INSECURITY: WITHIN THE PAST 12 MONTHS, YOU WORRIED THAT YOUR FOOD WOULD RUN OUT BEFORE YOU GOT MONEY TO BUY MORE.: NEVER TRUE

## 2024-06-05 SDOH — HEALTH STABILITY: MENTAL HEALTH
HOW OFTEN DO YOU NEED TO HAVE SOMEONE HELP YOU WHEN YOU READ INSTRUCTIONS, PAMPHLETS, OR OTHER WRITTEN MATERIAL FROM YOUR DOCTOR OR PHARMACY?: NEVER

## 2024-06-05 SDOH — HEALTH STABILITY: PHYSICAL HEALTH: ON AVERAGE, HOW MANY MINUTES DO YOU ENGAGE IN EXERCISE AT THIS LEVEL?: 60 MIN

## 2024-06-05 SDOH — SOCIAL STABILITY: SOCIAL INSECURITY: WITHIN THE LAST YEAR, HAVE YOU BEEN HUMILIATED OR EMOTIONALLY ABUSED IN OTHER WAYS BY YOUR PARTNER OR EX-PARTNER?: NO

## 2024-06-05 SDOH — SOCIAL STABILITY: SOCIAL INSECURITY: ABUSE: ADULT

## 2024-06-05 SDOH — SOCIAL STABILITY: SOCIAL INSECURITY: WERE YOU ABLE TO COMPLETE ALL THE BEHAVIORAL HEALTH SCREENINGS?: YES

## 2024-06-05 SDOH — HEALTH STABILITY: MENTAL HEALTH: HOW OFTEN DO YOU HAVE A DRINK CONTAINING ALCOHOL?: NEVER

## 2024-06-05 SDOH — SOCIAL STABILITY: SOCIAL INSECURITY: HAS ANYONE EVER THREATENED TO HURT YOUR FAMILY OR YOUR PETS?: NO

## 2024-06-05 SDOH — ECONOMIC STABILITY: HOUSING INSECURITY: IN THE LAST 12 MONTHS, HOW MANY PLACES HAVE YOU LIVED?: 1

## 2024-06-05 SDOH — ECONOMIC STABILITY: INCOME INSECURITY: IN THE PAST 12 MONTHS, HAS THE ELECTRIC, GAS, OIL, OR WATER COMPANY THREATENED TO SHUT OFF SERVICE IN YOUR HOME?: NO

## 2024-06-05 SDOH — HEALTH STABILITY: MENTAL HEALTH: HOW OFTEN DO YOU HAVE 6 OR MORE DRINKS ON ONE OCCASION?: NEVER

## 2024-06-05 SDOH — HEALTH STABILITY: MENTAL HEALTH: HOW MANY STANDARD DRINKS CONTAINING ALCOHOL DO YOU HAVE ON A TYPICAL DAY?: PATIENT DOES NOT DRINK

## 2024-06-05 SDOH — SOCIAL STABILITY: SOCIAL INSECURITY: HAVE YOU HAD ANY THOUGHTS OF HARMING ANYONE ELSE?: NO

## 2024-06-05 SDOH — SOCIAL STABILITY: SOCIAL INSECURITY
WITHIN THE LAST YEAR, HAVE TO BEEN RAPED OR FORCED TO HAVE ANY KIND OF SEXUAL ACTIVITY BY YOUR PARTNER OR EX-PARTNER?: NO

## 2024-06-05 SDOH — SOCIAL STABILITY: SOCIAL NETWORK
IN A TYPICAL WEEK, HOW MANY TIMES DO YOU TALK ON THE PHONE WITH FAMILY, FRIENDS, OR NEIGHBORS?: MORE THAN THREE TIMES A WEEK

## 2024-06-05 SDOH — SOCIAL STABILITY: SOCIAL INSECURITY: WITHIN THE LAST YEAR, HAVE YOU BEEN AFRAID OF YOUR PARTNER OR EX-PARTNER?: NO

## 2024-06-05 SDOH — HEALTH STABILITY: MENTAL HEALTH
STRESS IS WHEN SOMEONE FEELS TENSE, NERVOUS, ANXIOUS, OR CAN'T SLEEP AT NIGHT BECAUSE THEIR MIND IS TROUBLED. HOW STRESSED ARE YOU?: ONLY A LITTLE

## 2024-06-05 SDOH — SOCIAL STABILITY: SOCIAL INSECURITY: DOES ANYONE TRY TO KEEP YOU FROM HAVING/CONTACTING OTHER FRIENDS OR DOING THINGS OUTSIDE YOUR HOME?: NO

## 2024-06-05 SDOH — ECONOMIC STABILITY: TRANSPORTATION INSECURITY
IN THE PAST 12 MONTHS, HAS LACK OF TRANSPORTATION KEPT YOU FROM MEETINGS, WORK, OR FROM GETTING THINGS NEEDED FOR DAILY LIVING?: NO

## 2024-06-05 SDOH — SOCIAL STABILITY: SOCIAL INSECURITY: ARE YOU OR HAVE YOU BEEN THREATENED OR ABUSED PHYSICALLY, EMOTIONALLY, OR SEXUALLY BY ANYONE?: NO

## 2024-06-05 SDOH — SOCIAL STABILITY: SOCIAL NETWORK: HOW OFTEN DO YOU GET TOGETHER WITH FRIENDS OR RELATIVES?: MORE THAN THREE TIMES A WEEK

## 2024-06-05 SDOH — SOCIAL STABILITY: SOCIAL INSECURITY: ARE THERE ANY APPARENT SIGNS OF INJURIES/BEHAVIORS THAT COULD BE RELATED TO ABUSE/NEGLECT?: NO

## 2024-06-05 SDOH — ECONOMIC STABILITY: INCOME INSECURITY: HOW HARD IS IT FOR YOU TO PAY FOR THE VERY BASICS LIKE FOOD, HOUSING, MEDICAL CARE, AND HEATING?: NOT HARD AT ALL

## 2024-06-05 SDOH — SOCIAL STABILITY: SOCIAL INSECURITY: DO YOU FEEL UNSAFE GOING BACK TO THE PLACE WHERE YOU ARE LIVING?: NO

## 2024-06-05 SDOH — ECONOMIC STABILITY: TRANSPORTATION INSECURITY
IN THE PAST 12 MONTHS, HAS THE LACK OF TRANSPORTATION KEPT YOU FROM MEDICAL APPOINTMENTS OR FROM GETTING MEDICATIONS?: NO

## 2024-06-05 ASSESSMENT — COGNITIVE AND FUNCTIONAL STATUS - GENERAL
EATING MEALS: A LITTLE
PERSONAL GROOMING: A LITTLE
WALKING IN HOSPITAL ROOM: A LITTLE
PERSONAL GROOMING: A LITTLE
CLIMB 3 TO 5 STEPS WITH RAILING: A LITTLE
TOILETING: A LITTLE
MOVING TO AND FROM BED TO CHAIR: A LITTLE
DRESSING REGULAR UPPER BODY CLOTHING: A LITTLE
STANDING UP FROM CHAIR USING ARMS: A LITTLE
DRESSING REGULAR UPPER BODY CLOTHING: A LITTLE
DRESSING REGULAR LOWER BODY CLOTHING: A LITTLE
TURNING FROM BACK TO SIDE WHILE IN FLAT BAD: A LITTLE
MOVING TO AND FROM BED TO CHAIR: A LITTLE
MOVING FROM LYING ON BACK TO SITTING ON SIDE OF FLAT BED WITH BEDRAILS: A LITTLE
HELP NEEDED FOR BATHING: A LOT
DAILY ACTIVITIY SCORE: 17
DRESSING REGULAR LOWER BODY CLOTHING: A LOT
TOILETING: A LITTLE
MOBILITY SCORE: 17
MOBILITY SCORE: 20
PATIENT BASELINE BEDBOUND: NO
WALKING IN HOSPITAL ROOM: A LITTLE
STANDING UP FROM CHAIR USING ARMS: A LITTLE
DAILY ACTIVITIY SCORE: 19
CLIMB 3 TO 5 STEPS WITH RAILING: A LOT

## 2024-06-05 ASSESSMENT — ACTIVITIES OF DAILY LIVING (ADL)
ADL_ASSISTANCE: INDEPENDENT
LACK_OF_TRANSPORTATION: NO
FEEDING YOURSELF: INDEPENDENT
ASSISTIVE_DEVICE: CANE;WALKER
TOILETING: NEEDS ASSISTANCE
ADL_ASSISTANCE: INDEPENDENT
BATHING_ASSISTANCE: MODERATE
GROOMING: INDEPENDENT
JUDGMENT_ADEQUATE_SAFELY_COMPLETE_DAILY_ACTIVITIES: YES
LACK_OF_TRANSPORTATION: NO
HEARING - RIGHT EAR: FUNCTIONAL
PATIENT'S MEMORY ADEQUATE TO SAFELY COMPLETE DAILY ACTIVITIES?: YES
BATHING: INDEPENDENT
DRESSING YOURSELF: INDEPENDENT
HEARING - LEFT EAR: FUNCTIONAL
ADEQUATE_TO_COMPLETE_ADL: YES
HOME_MANAGEMENT_TIME_ENTRY: 10
WALKS IN HOME: INDEPENDENT

## 2024-06-05 ASSESSMENT — LIFESTYLE VARIABLES
SUBSTANCE_ABUSE_PAST_12_MONTHS: NO
SKIP TO QUESTIONS 9-10: 1
HOW MANY STANDARD DRINKS CONTAINING ALCOHOL DO YOU HAVE ON A TYPICAL DAY: PATIENT DOES NOT DRINK
HOW OFTEN DO YOU HAVE 6 OR MORE DRINKS ON ONE OCCASION: NEVER
SKIP TO QUESTIONS 9-10: 1
AUDIT-C TOTAL SCORE: 0
HOW OFTEN DO YOU HAVE A DRINK CONTAINING ALCOHOL: NEVER
PRESCIPTION_ABUSE_PAST_12_MONTHS: NO

## 2024-06-05 ASSESSMENT — PAIN SCALES - GENERAL
PAINLEVEL_OUTOF10: 10 - WORST POSSIBLE PAIN
PAINLEVEL_OUTOF10: 8
PAINLEVEL_OUTOF10: 5 - MODERATE PAIN
PAINLEVEL_OUTOF10: 0 - NO PAIN
PAINLEVEL_OUTOF10: 8
PAINLEVEL_OUTOF10: 7

## 2024-06-05 ASSESSMENT — PAIN - FUNCTIONAL ASSESSMENT
PAIN_FUNCTIONAL_ASSESSMENT: 0-10

## 2024-06-05 ASSESSMENT — PAIN DESCRIPTION - ORIENTATION
ORIENTATION: LEFT
ORIENTATION: RIGHT

## 2024-06-05 ASSESSMENT — PATIENT HEALTH QUESTIONNAIRE - PHQ9
SUM OF ALL RESPONSES TO PHQ9 QUESTIONS 1 & 2: 0
2. FEELING DOWN, DEPRESSED OR HOPELESS: NOT AT ALL
1. LITTLE INTEREST OR PLEASURE IN DOING THINGS: NOT AT ALL

## 2024-06-05 ASSESSMENT — PAIN DESCRIPTION - DESCRIPTORS
DESCRIPTORS: ACHING;NAGGING
DESCRIPTORS: ACHING

## 2024-06-05 ASSESSMENT — PAIN DESCRIPTION - LOCATION
LOCATION: BACK
LOCATION: HIP

## 2024-06-05 NOTE — PROGRESS NOTES
LPCC met with pt at bedside. Pt states she lives in a multistory house with her 93 yr old mother. Pts main bed and bath are on second floor, 12 step up. Pt with raised toilet, grab bars and shower bench in bathroom. Pt states she is independent for mobility however has both a walker and a cane that she uses as needed. Pt drives. Pt state she has brother and sister that live nearby and also are very supportive to her and her mother. Pt denies MH hx and issues with housing, finances and having enough food. Pts PCP is Dr Nila Zamora, prescriptions through [facility] in [place]. Pt states she does not currently have LW or HPOA but is working on getting this done. Discussion around dc planning, pts PT eval show mod int need with ampac of 17, pt needing minimal assist for transfers/mobility. LPCC and pt discussed how she felt in working with therapy and she states she did not feel unstable or far from her baseline mobility, states that now she is feeling better physically than when she came in she does not feel as weak. Pt state that if she went home she would have her siblings take over some of the duties that she assists her mom with which are not much while she recuperated and that she would also ask them to check on both of them more frequently, family is very close and supportive. LPCC and pt agree that return home with [facility] will be more ideal for her and a safe dc plan. Pt gave ok for referral to [facility]. [facility] aware that internal referral for homecare is needed.      06/05/24 1334   Discharge Planning   Living Arrangements Spouse/significant other   Support Systems Spouse/significant other   Type of Residence Private residence   Do you have animals or pets at home? Yes   Type of Animals or Pets Turtle   Who is requesting discharge planning? Provider   Home or Post Acute Services None   Patient expects to be discharged to: Home with [facility]   Does the patient need discharge transport  arranged? No   Financial Resource Strain   How hard is it for you to pay for the very basics like food, housing, medical care, and heating? Not hard   Housing Stability   In the last 12 months, was there a time when you were not able to pay the mortgage or rent on time? N   In the last 12 months, how many places have you lived? 1   In the last 12 months, was there a time when you did not have a steady place to sleep or slept in a shelter (including now)? N   Transportation Needs   In the past 12 months, has lack of transportation kept you from medical appointments or from getting medications? no   In the past 12 months, has lack of transportation kept you from meetings, work, or from getting things needed for daily living? No

## 2024-06-05 NOTE — ED NOTES
Pt call bell/light noted on. Upon entering room Pt had gotten herself down to the foot of the bed and was getting up to use the restroom. Pt helped with 1 person assist to restroom and assisted back to bed when complete.      Richard Tucker RN  06/04/24 [id number]

## 2024-06-05 NOTE — PROGRESS NOTES
"Indu Red is a 73 y.o. female on day 1 of admission presenting with Generalized weakness.      Subjective   Patient seen and examined, adult daughter at bedside.  Patient states that she is feeling \"a lot better\".  Observed ambulating with walker in the room.  Denies chest pain, states her back pain is improved.  Denies shortness of breath.  Reports improved appetite this morning.  Denies complaints.  No acute distress.       Objective     Last Recorded Vitals  /87 (BP Location: Left arm, Patient Position: Sitting)   Pulse 86   Temp 36.4 °C (97.5 °F) (Oral)   Resp 17   Wt 60.5 kg (133 lb 4.8 oz)   SpO2 99%   Intake/Output last 3 Shifts:    Intake/Output Summary (Last 24 hours) at 6/5/2024 1254  Last data filed at 6/5/2024 0913  Gross per 24 hour   Intake 556.25 ml   Output 3350 ml   Net -2793.75 ml       Admission Weight  Weight: 59 kg (130 lb) (06/04/24 1329)    Daily Weight  06/05/24 : 60.5 kg (133 lb 4.8 oz)    Image Results  XR pelvis 1-2 views  Narrative: Interpreted By:  Kuldeep Farmer,   STUDY:  XR PELVIS 1-2 VIEWS; 6/4/2024 3:14 pm      INDICATION:  Signs/Symptoms:pain; Pain      COMPARISON:  None available.      ACCESSION NUMBER(S):  [id number]      ORDERING CLINICIAN:  RAJESH ORTIZ      TECHNIQUE:  Views: AP view of the pelvis.      FINDINGS:  RESULTS: There is no evidence for acute fracture or dislocation.  There is a nonspecific bowel gas pattern.      Hip joint spaces are adequately maintained. Proximal femurs are  intact. No significant bony degenerative changes of the hip joints  are seen. SI joints and pelvic bones show no significant osseous  abnormality.      Impression: No evidence for osseous abnormality.      Signed by: Kuldeep Farmer 6/4/2024 3:50 PM  Dictation workstation:   [id number]  XR chest 1 view  Narrative: Interpreted By:  Jason Rick,   STUDY:  XR CHEST 1 VIEW;  6/4/2024 3:14 pm      INDICATION:  Signs/Symptoms:weakness.      COMPARISON:  CT chest " 09/21/2023      ACCESSION NUMBER(S):  [id number]      ORDERING CLINICIAN:  RAJESH ORTIZ      FINDINGS:  AP radiograph of the chest was provided.      DEVICES:  None      CARDIOMEDIASTINAL SILHOUETTE:  Cardiomediastinal silhouette is normal in size and configuration.No  significant atherosclerotic calcification.      LUNGS:  No focal consolidation. No pneumothorax. No pleural effusion.      BONES:  No acute osseous changes.      Impression: 1.  No evidence of acute cardiopulmonary process.          Signed by: Jason Rick 6/4/2024 3:39 PM  Dictation workstation:   [id number]  ECG 12 lead  Normal sinus rhythm  Nonspecific T wave abnormality  Abnormal ECG  When compared with ECG of 24-FEB-2020 12:54,  Vent. rate has increased BY  32 BPM  QT has lengthened  Confirmed by Natalio Newberry ([id number]) on 6/4/2024 3:16:50 PM      Physical Exam  Vitals and nursing note reviewed.   Constitutional:       Appearance: Normal appearance.   HENT:      Head: Normocephalic and atraumatic.      Mouth/Throat:      Mouth: Mucous membranes are moist.   Eyes:      Extraocular Movements: Extraocular movements intact.      Pupils: Pupils are equal, round, and reactive to light.   Cardiovascular:      Rate and Rhythm: Normal rate.      Pulses: Normal pulses.      Heart sounds: Normal heart sounds.   Pulmonary:      Effort: Pulmonary effort is normal.      Breath sounds: Normal breath sounds.   Abdominal:      General: Bowel sounds are normal.      Palpations: Abdomen is soft.   Musculoskeletal:         General: Normal range of motion.      Cervical back: Normal range of motion and neck supple.   Skin:     General: Skin is warm and dry.      Capillary Refill: Capillary refill takes less than 2 seconds.   Neurological:      General: No focal deficit present.      Mental Status: She is alert and oriented to person, place, and time.   Psychiatric:         Mood and Affect: Mood normal.         Relevant Results               Assessment/Plan       Principal Problem:    Generalized weakness    Hypokalemia-resolved  Received potassium replacement yesterday resolved  Hold home hydrochlorothiazide  Monitor BMP     Generalized weakness-improving  Chronic Pain-improving  Does have history of MS, per patient that is in remission  Completed potassium replacement yesterday  Hold home statin for now  PRN tramadol   PT OT  Falls precautions  Encourage p.o. intake     Mild leukocytosis-resolved     Mildly elevated troponin  Flat pattern.  Denies chest pain.  EKG NSR     History of MS  Per patient, currently in remission for over a year     History of hypertension  Continue home amlodipine, lisinopril  Hold home HCTZ  Monitor vital signs     History of hypothyroid  Continue home Synthroid  Normal TSH and free T4     History of depression  Continue home Effexor     History of GERD  Continue home PPI     History of CAD, HLD  Continue home ASA, hold statin for now     Nicotine dependence  Has been counseled on cessation  Will offer nicotine patch which patient seems more receptive to today     DVT/GI  Heparin subcu, SCDs, PPI     Dispo: Patient open to placement.  PT recommending moderate intensity rehab needs.              Corinna Luna, APRN-CNP

## 2024-06-05 NOTE — NURSING NOTE
06/04/24 2227: Pt arrived from ED to room 446, Pt is A/Ox4 on room air and observed crying and expressed dissatisfaction regarding care received in the ED when she needed assistance to the restroom, Pt also c/o mild back  pain otherwise hemodynamically stable.Pt assisted in getting situated in her room, admission package provided, safety maintained call light  and personal items at reach.

## 2024-06-05 NOTE — ED PROVIDER NOTES
HPI   Chief Complaint   Patient presents with    Back Pain       HPI  Patient is a 73-year-old female who presents to ED for multiple complaints, chief complaint is lower back pain.  Patient is accompanied by daughter who is helping to provide history.  The daughter states that the patient is declining somewhat after being discharged from a rehab earlier this year after a hard fall.  Patient states that in general everything hurts.  She complains of severe back pain, bilateral hip and leg pain.  Patient has no specific complaints of chest pain or shortness of breath, recent illness or fevers, cough or congestion, headache or dizziness, abdominal pain or NVD, urinary symptoms.  Daughter of patient states that it took 25 minutes for the patient to walk down the stairs to leave the house today and that she was laying in bed for the last 3 days.  She states that she has not been eating well or drinking very much over the last 3 days.                  Yuli Coma Scale Score: 15                     Patient History   Past Medical History:   Diagnosis Date    Anemia     Esophagitis     Gastroesophageal reflux disease due to diaphragmatic hernia     Goiter     Hypertension     Left shoulder pain     Multiple sclerosis (Multi)     Personal history of other diseases of the female genital tract 08/03/2016    History of ovarian cyst    Personal history of other diseases of the respiratory system 01/19/2017    History of acute bronchitis    Personal history of other specified conditions 08/03/2016    History of wheezing    Postoperative urinary retention     Shoulder pain, right     Ulcer of esophagus without bleeding     Esophageal erosions     Past Surgical History:   Procedure Laterality Date    APPENDECTOMY  08/03/2016    COLONOSCOPY  08/03/2016    With polypectomy    ESOPHAGOGASTRODUODENOSCOPY      FOOT SURGERY Left     MR HEAD ANGIO WO IV CONTRAST  07/21/2023    MR HEAD ANGIO WO IV CONTRAST 7/21/2023 [facility] [place] [facility]    MR NECK  ANGIO WO IV CONTRAST  07/21/2023    MR NECK ANGIO WO IV CONTRAST 7/21/2023 [facility] [place]    OOPHORECTOMY  08/03/2016    OTHER SURGICAL HISTORY  08/03/2016    Cervical Conization    SALPINGECTOMY      SHOULDER SURGERY Right 02/13/2024    TEMPOROMANDIBULAR JOINT SURGERY      THYROIDECTOMY, PARTIAL      Substernal    TOTAL VAGINAL HYSTERECTOMY  03/09/2017     Family History   Problem Relation Name Age of Onset    Other (PCI) Mother  80 - 89        Stent    Heart disease Mother      Hypertension Mother      Stomach cancer Father       Social History     Tobacco Use    Smoking status: Every Day     Current packs/day: 0.25     Average packs/day: 0.3 packs/day for 50.0 years (12.5 ttl pk-yrs)     Types: Cigarettes     Passive exposure: Current    Smokeless tobacco: Never   Vaping Use    Vaping status: Never Used   Substance Use Topics    Alcohol use: Not Currently    Drug use: Yes     Frequency: 1.0 times per week     Types: Marijuana     Comment: RECREATIONAL FOR SLEEP       Physical Exam   ED Triage Vitals   Temp Heart Rate Resp BP   06/04/24 1327 06/04/24 1327 06/04/24 1327 06/04/24 1329   36.7 °C (98.1 °F) 62 18 134/84      SpO2 Temp Source Heart Rate Source Patient Position   06/04/24 1327 06/04/24 2247 06/04/24 2100 06/04/24 2247   95 % Oral Monitor Lying      BP Location FiO2 (%)     06/04/24 2247 --     Left arm        Physical Exam  Vitals reviewed.   Constitutional:       General: She is in acute distress.      Appearance: She is ill-appearing.   HENT:      Head: Normocephalic and atraumatic.   Eyes:      Extraocular Movements: Extraocular movements intact.   Cardiovascular:      Rate and Rhythm: Normal rate and regular rhythm.   Pulmonary:      Effort: Pulmonary effort is normal.      Breath sounds: Normal breath sounds.   Abdominal:      General: Abdomen is flat.      Palpations: Abdomen is soft.   Musculoskeletal:         General: Normal range of motion.      Cervical back: Normal range of motion and neck supple.    Skin:     General: Skin is warm and dry.   Neurological:      Mental Status: She is alert and oriented to person, place, and time.      Sensory: Sensation is intact.      Motor: Motor function is intact.   Psychiatric:         Mood and Affect: Mood is anxious. Affect is tearful.         Behavior: Behavior is cooperative.         ED Course & MDM   ED Course as of 06/05/24 1119   Tue Jun 04, 2024   1441 ECG 12 lead  Performed at  1425, HR of 82, NSR, NAD, QTc 457, no sign of STEMI, no Q wave or T wave abnormality noted.    Reviewed and interpreted by me at time performed   [JM]      ED Course User Index  [JM] Brittni Redd MD         Diagnoses as of 06/05/24 1119   Generalized weakness   Hypokalemia       Medical Decision Making  Parts of this chart have been completed using voice recognition software. Please excuse any errors of transcription.  My thought process and reason for plan has been formulated from the time that I saw the patient until the time of disposition and is not specific to one specific moment during their visit and furthermore my MDM encompasses this entire chart and not only this text box.    HPI:   Detailed above.    Exam:   A medically appropriate exam performed, outlined above, given the known history and presentation.    History obtained from:   Patient, EMR,  Family of Patient    EKG/Cardiac monitor:   Interpreted by attending physician, see their note for ED course for more detail.    Social Determinants of Health considered during this visit:   Housing, Family or social support    Medications given during visit:  Medications   amLODIPine (Norvasc) tablet 5 mg (5 mg oral Given 6/5/24 0910)   aspirin EC tablet 81 mg (81 mg oral Given 6/5/24 0910)   cholecalciferol (Vitamin D-3) tablet 1,000 Units (1,000 Units oral Given 6/5/24 0911)   venlafaxine (Effexor) tablet 25 mg (25 mg oral Given 6/5/24 0912)   traMADol (Ultram) tablet 50 mg (50 mg oral Given 6/5/24 0014)   pantoprazole  (ProtoNix) EC tablet 40 mg (40 mg oral Given 6/5/24 0911)   metoprolol succinate XL (Toprol-XL) 24 hr tablet 25 mg (25 mg oral Given 6/5/24 0911)   levothyroxine (Synthroid, Levoxyl) tablet 50 mcg (50 mcg oral Given 6/5/24 0731)   albuterol 2.5 mg /3 mL (0.083 %) nebulizer solution 2.5 mg (has no administration in time range)   heparin (porcine) injection 5,000 Units (5,000 Units subcutaneous Given 6/5/24 0910)   polyethylene glycol (Glycolax, Miralax) packet 17 g (17 g oral Given 6/5/24 0012)   docusate sodium (Colace) capsule 100 mg (100 mg oral Given 6/5/24 0910)   ondansetron (Zofran) tablet 4 mg (has no administration in time range)   traMADol (Ultram) tablet 50 mg (50 mg oral Given 6/4/24 1421)   potassium chloride (Klor-Con) packet 40 mEq (40 mEq oral Given 6/4/24 1532)   potassium chloride 20 mEq in 100 mL IV premix (0 mEq intravenous Stopped 6/4/24 2055)   potassium chloride 20 mEq in 100 mL IV premix (0 mEq intravenous Stopped 6/4/24 2258)   potassium chloride CR (Klor-Con M20) ER tablet 20 mEq (20 mEq oral Given 6/5/24 0911)        Diagnostic/tests:  Labs Reviewed   CBC WITH AUTO DIFFERENTIAL - Abnormal       Result Value    WBC 11.7 (*)     nRBC 0.0      RBC 4.13      Hemoglobin 11.5 (*)     Hematocrit 34.5 (*)     MCV 84      MCH 27.8      MCHC 33.3      RDW 14.9 (*)     Platelets 299      Neutrophils % 81.1      Immature Granulocytes %, Automated 0.3      Lymphocytes % 10.3      Monocytes % 7.7      Eosinophils % 0.2      Basophils % 0.4      Neutrophils Absolute 9.49 (*)     Immature Granulocytes Absolute, Automated 0.04      Lymphocytes Absolute 1.20      Monocytes Absolute 0.90 (*)     Eosinophils Absolute 0.02      Basophils Absolute 0.05     COMPREHENSIVE METABOLIC PANEL - Abnormal    Glucose 82      Sodium 136      Potassium 2.8 (*)     Chloride 98      Bicarbonate 20 (*)     Urea Nitrogen 9      Creatinine 1.10      eGFR 53 (*)     Calcium 9.5      Albumin 3.8      Alkaline Phosphatase 79       Total Protein 7.1      AST 10      Bilirubin, Total 0.9      ALT <5 (*)     Anion Gap 18     URINALYSIS WITH REFLEX CULTURE AND MICROSCOPIC - Abnormal    Color, Urine Colorless (*)     Appearance, Urine Clear      Specific Gravity, Urine 1.005      pH, Urine 6.5      Protein, Urine 30 (1+) (*)     Glucose, Urine Normal      Blood, Urine 0.5 (2+) (*)     Ketones, Urine 10 (1+) (*)     Bilirubin, Urine NEGATIVE      Urobilinogen, Urine Normal      Nitrite, Urine NEGATIVE      Leukocyte Esterase, Urine NEGATIVE     SERIAL TROPONIN, INITIAL ([facility]) - Abnormal    Troponin T, High Sensitivity 17 (*)    SERIAL TROPONIN,  2 HOUR ([facility]) - Abnormal    Troponin T, High Sensitivity 18 (*)    SEDIMENTATION RATE, AUTOMATED - Abnormal    Sedimentation Rate 90 (*)    C-REACTIVE PROTEIN - Abnormal    C-Reactive Protein 16.40 (*)    DRUG SCREEN,URINE - Abnormal    Amphetamine Screen, Urine Negative      Barbiturate Screen, Urine Negative      Benzodiazepines Screen, Urine Negative      Cannabinoid Screen, Urine Positive (*)     Cocaine Metabolite Screen, Urine Negative      Fentanyl Screen, Urine Negative      Methadone Screen, Urine Negative      Opiate Screen, Urine Negative      Oxycodone Screen, Urine Negative      PCP Screen, Urine Negative      Narrative:     These toxicological screening tests provide unconfirmed qualitative measurements to aid in treatment and diagnosis in cases of drug use or overdose. This test is used only for medical purposes. A positive result does not indicate or measure intoxication. For specific test performance or pathologist consultation, please contact the Laboratory.    The following threshold concentrations are used for these analyses.Values at or above the threshold concentration are reported as positive. Values below the threshold are reported as negative.    Drug /Screening Threshold                                                                                                  THC/CANNABINOIDS................50 ng/ml  METHADONE......................300 ng/ml  COCAINE METABOLITES............300 ng/ml  BENZODIAZEPINE.................300 ng/ml  PCP.............................25 ng/ml  OPIATE.........................300 ng/ml  AMPHETAMINE/ECSTASY...........1000 ng/ml  BARBITURATE....................200 ng/ml  OXYCODONE......................100 ng/ml  FENTANYL.........................5 ng/ml       SERIAL TROPONIN, 6 HOUR ([facility]) - Abnormal    Troponin T, High Sensitivity 15 (*)    BASIC METABOLIC PANEL - Abnormal    Glucose 106 (*)     Sodium 139      Potassium 3.7      Chloride 107      Bicarbonate 20 (*)     Urea Nitrogen 7 (*)     Creatinine 0.90      eGFR 68      Calcium 8.7      Anion Gap 12     CBC WITH AUTO DIFFERENTIAL - Abnormal    WBC 10.0      nRBC 0.0      RBC 3.57 (*)     Hemoglobin 9.8 (*)     Hematocrit 30.1 (*)     MCV 84      MCH 27.5      MCHC 32.6      RDW 14.6 (*)     Platelets 290      Neutrophils % 75.4      Immature Granulocytes %, Automated 0.4      Lymphocytes % 12.6      Monocytes % 10.8      Eosinophils % 0.5      Basophils % 0.3      Neutrophils Absolute 7.54 (*)     Immature Granulocytes Absolute, Automated 0.04      Lymphocytes Absolute 1.26      Monocytes Absolute 1.08 (*)     Eosinophils Absolute 0.05      Basophils Absolute 0.03     URINALYSIS MICROSCOPIC WITH REFLEX CULTURE - Abnormal    WBC, Urine 1-5      RBC, Urine 11-20 (*)     Squamous Epithelial Cells, Urine 1-9 (SPARSE)      Bacteria, Urine 1+ (*)    MAGNESIUM - Normal    Magnesium 1.70     CREATINE KINASE - Normal    Creatine Kinase 53     TSH - Normal    Thyroid Stimulating Hormone 1.31     THYROXINE, FREE - Normal    Thyroxine, Free 1.40     URINE CULTURE   URINALYSIS WITH REFLEX CULTURE AND MICROSCOPIC    Narrative:     The following orders were created for panel order Urinalysis with Reflex Culture and Microscopic.  Procedure                               Abnormality         Status                      ---------                               -----------         ------                     Urinalysis with Reflex C...[[id number]]  Abnormal            Final result               Extra Urine Gray Tube[[id number]]                            Final result                 Please view results for these tests on the individual orders.   TROPONIN T SERIES, HIGH SENSITIVITY (0, 2 HR, 6 HR)    Narrative:     The following orders were created for panel order Troponin T Series, High Sensitivity (0, 2HR, 6HR).  Procedure                               Abnormality         Status                     ---------                               -----------         ------                     Serial Troponin, Initial...[[id number]]  Abnormal            Final result               Serial Troponin, 2 Hour ...[[id number]]  Abnormal            Final result               Serial Troponin, 6 Hour ...[[id number]]  Abnormal            Final result                 Please view results for these tests on the individual orders.   EXTRA URINE GRAY TUBE    Extra Tube Hold for add-ons.     HEMOGLOBIN A1C    Hemoglobin A1C 5.4      Estimated Average Glucose 108      Narrative:     Diagnosis of Diabetes-Adults  Non-Diabetic: < or = 5.6%  Increased risk for developing diabetes: 5.7-6.4%  Diagnostic of diabetes: > or = 6.5%    Monitoring of Diabetes  Age (y)....................... Therapeutic Goal (%)  Adults: >18.........................<7.0  Pediatrics: 13-18...................<7.5  Pediatrics: 7-12....................<8.0  Pediatrics: 0-6..................... 7.5-8.5    American Diabetes Association. Diabetes Care 33(S1), Jan 2010          XR chest 1 view   Final Result   1.  No evidence of acute cardiopulmonary process.             Signed by: Jason Rick 6/4/2024 3:39 PM   Dictation workstation:   [id number]      XR pelvis 1-2 views   Final Result   No evidence for osseous abnormality.        Signed by: Kuldeep Farmer 6/4/2024 3:50 PM   Dictation  workstation:   [id number]           Differential Diagnosis:   As I have deemed necessary from their history, physical, and studies, I have considered the following diagnoses:     SPINAL CORD COMPRESSION  SPINAL EPIDURAL ABSCESS  METASTATIC CANCER  VERTEBRAL COMPRESSION FRACTURE  RADICULOPATHY  SPINAL STENOSIS  OSTEOARTHRITIS  SCOLIOSIS  PIRIFORMIS SYNDROME  LUMBAR STRAIN  SCIATICA    No emergent MRI indicated  I completed a structured, evidence-based clinical evaluation to screen for acute non-traumatic spinal emergencies. The patient has a normal detailed neurologic exam and a low red flag score. The evidence indicates that the patient is very low risk for an acute spinal emergency and this is consistent with my clinical intuition. The risk of further workup is higher than the likelihood of the patient having a spinal epidural abscess or other dangerous emergency spinal condition. It is, therefore, in the patient´s best interest not to do additional emergent testing at this time. I have discussed with the patient my clinical impression and the result of an evidence-based clinical evaluation to screen for spinal epidural abscess and other spinal emergencies, as well as the risk of further testing and hospitalization. The evidence shows that the risk for an acute spinal emergency is less than 1%. Although the risk of an acute spinal emergency has not been completely eliminated, the risks of further testing likely exceed any potential benefit, and the patient agrees with not pursuing further emergent evaluation for causes of back pain at this time.    BACK PAIN RED FLAGS    Minor (1 Point Each)  Alcohol Abuse  Diabetes Mellitus  Renal Failure  Night Pain  3rd Visit in last 20 days    Major (3 Points Each)  IV Drug Use  Fever without focus  Recent/Current Systemic Infection  Immunosuppression  Recent Spinal Fracture/Procedure  Incontinence or Retention  Indwelling Urinary  Catheter    Consultations:      Procedures:      Critical Care:  Upon my evaluation, this patient had a high probability of imminent or life-threatening deterioration due to severe hypokalemia, which required my direct attention, intervention, and personal management.    I have personally provided 60 minutes of non-concurrent critical care time exclusive of time spent on separately billable procedures. Time includes review of laboratory data, radiology results, discussion with consultants, and monitoring for potential decompensation. Interventions were performed as documented above.    Interventions include: Potassium repletion    Referrals:      Discharge Prescriptions:      MDM Summary:  Patient was found to have potassium of 2.8.  Workup was otherwise unremarkable.  There is no leukocytosis, patient is anemic at 9.8.  CMP otherwise shows no major electrolyte abnormality or acute kidney injury, patient has CKD.  Urinalysis shows no evidence of urinary tract infection.  Imaging of pelvis and chest showed no acute findings.  Patient was repleted with potassium and given Ultram for pain.  Given patient's inability to care for herself she would benefit from admission to inpatient medical service until she is feeling well enough to function independently at home.  I spoke with the admitting physician Dr. Yanes. We thoroughly discussed the patient's medical history, physical exam, laboratory and imaging studies, as well as, emergency department course. This also includes the patient's comorbidities, surgical history, medications, and living situation. Based upon that discussion, we've decided to admit for further management and evaluation of their back pain. The patient will be admitted to RNF unit as determined by myself and the admitting physician. The admitting physician has been fully informed regarding this case and agreed to place the admission order for the patient. Please see the admission H&P authored by   Joaquín for further detail.    Procedure  Procedures     Eriberto Moctezuma PA-C  06/05/24 1138

## 2024-06-05 NOTE — PROGRESS NOTES
Physical Therapy    Physical Therapy Evaluation & Treatment    Patient Name: Indu Red  MRN: [id number]  Today's Date: 6/5/2024   Time Calculation  Start Time: 0814  Stop Time: 0835  Time Calculation (min): 21 min    Assessment/Plan   PT Assessment  PT Assessment Results: Decreased strength, Decreased endurance, Impaired balance, Decreased mobility, Decreased coordination, Decreased safety awareness, Pain  Rehab Prognosis: Good  Evaluation/Treatment Tolerance: Patient tolerated treatment well  Medical Staff Made Aware: Yes  Strengths: Ability to acquire knowledge, Support of extended family/friends  Barriers to Participation: Comorbidities  End of Session Communication: Bedside nurse  Assessment Comment: Pt demonstrates impaired functional mobility from baseline level; pt with pain, mild balance/BLE strength deficits, decreased activity tolerance, and impaired safety awareness; pt is a high falls risk at this time.  End of Session Patient Position: Up in chair, Alarm on   IP OR SWING BED PT PLAN  Inpatient or Swing Bed: Inpatient  PT Plan  Treatment/Interventions: Bed mobility, Transfer training, Gait training, Stair training, Balance training, Neuromuscular re-education, Strengthening, Endurance training, Therapeutic exercise, Therapeutic activity, Home exercise program  PT Plan: Skilled PT  PT Frequency: 4 times per week  PT Discharge Recommendations: Moderate intensity level of continued care  Equipment Recommended upon Discharge: Wheeled walker  PT Recommended Transfer Status: Assist x1  PT - OK to Discharge: Yes    Subjective     General Visit Information:  General  Reason for Referral: impaired functional mobility; pt is a 73 year-old F admitted from home with c/o weakness/fatigue; h/o recent hospitalization d/t syncopal episode and fall.  Referred By: Dr. Joaquín MD  Past Medical History Relevant to Rehab: CKD, DJD, GERD, MS, OA, OP, PVD, TIA, anemia.  Family/Caregiver Present: No  Co-Treatment:  OT  Co-Treatment Reason: co-evaluation to optimize pt outcomes/safety  Prior to Session Communication: Bedside nurse  Patient Position Received: Bed, 3 rail up, Alarm off, not on at start of session  Preferred Learning Style: verbal  General Comment: Pt cleared for therapy via RN, received in supine, NAD, agreeable to participate in therapy. (+) telemetry  Home Living:  Home Living  Type of Home: House  Lives With: Parent(s) (lives with mother (93 year-old))  Home Adaptive Equipment: Cane  Home Layout: One level, Laundry in basement  Home Access: Stairs to enter with rails  Entrance Stairs-Rails: Right  Entrance Stairs-Number of Steps: 3-4  Bathroom Shower/Tub: Tub/shower unit  Bathroom Toilet: Standard  Bathroom Equipment: Grab bars in shower, Shower chair with back  Home Living Comments: family local and supportive  Prior Level of Function:  Prior Function Per Pt/Caregiver Report  Level of [place]: Independent with ADLs and functional transfers, Needs assistance with homemaking  Receives Help From: Family  ADL Assistance: Independent  Homemaking Assistance: Needs assistance  Driving/Transportation: Family/Friend  Homemaking Assistance Comments: assist from family with iADLs  Ambulatory Assistance: Independent (mod indep with SC prn)  Vocational: Retired  Hand Dominance: Right  Prior Function Comments: h/o two recent falls; pt reports increased difficulty with i/ADLs  Precautions:  Precautions  Hearing/Visual Limitations: reading glasses; hearing WFL  Medical Precautions: Fall precautions    Objective   Pain:  Pain Assessment  Pain Assessment: 0-10  Pain Score: 8  Pain Type: Acute pain  Pain Location: Back  Pain Interventions: Repositioned  Response to Interventions: improved  Cognition:  Cognition  Overall Cognitive Status: Within Functional Limits  Orientation Level: Oriented X4  Processing Speed: Delayed    General Assessments:  General Observation  General Observation: pleasant/cooperative  throughout    Activity Tolerance  Endurance: Tolerates 10 - 20 min exercise with multiple rests  Activity Tolerance Comments: limited d/t c/o low back pain    Sensation  Light Touch: No apparent deficits  Sensation Comment: pt denies paresthesias    Strength  Strength Comments: BLE > 3+/5  Strength  Strength Comments: BLE > 3+/5    Coordination  Movements are Fluid and Coordinated: No  Coordination Comment: decreased rate of movements    Postural Control  Postural Control: Impaired  Posture Comment: forward head, rounded shoulders    Static Sitting Balance  Static Sitting-Balance Support: Feet supported  Static Sitting-Level of Assistance: Close supervision    Static Standing Balance  Static Standing-Balance Support: Bilateral upper extremity supported  Static Standing-Level of Assistance: Minimum assistance  Functional Assessments:     Bed Mobility  Bed Mobility: Yes  Bed Mobility 1  Bed Mobility 1: Supine to sitting  Level of Assistance 1: Minimum assistance  Bed Mobility Comments 1: assist for trunk elevation    Transfers  Transfer: Yes  Transfer 1  Transfer From 1: Sit to  Transfer to 1: Stand  Technique 1: Sit to stand  Transfer Device 1: Walker  Transfer Level of Assistance 1: Minimum assistance  Trials/Comments 1: assist for  balance, cueing for sequencing  Transfers 2  Transfer From 2: Stand to  Transfer to 2: Sit  Technique 2: Stand to sit  Transfer Device 2: Walker  Transfer Level of Assistance 2: Minimum assistance  Trials/Comments 2: assist for safe eccentric lowering into chair; cueing for safety/sequencing    Ambulation/Gait Training  Ambulation/Gait Training Performed: Yes  Ambulation/Gait Training 1  Surface 1: Level tile  Device 1: Rolling walker  Assistance 1: Minimum assistance  Quality of Gait 1: Narrow base of support, Decreased step length, Forward flexed posture (significantly decreased mishel)  Comments/Distance (ft) 1: 10' x 2; 30' x 2    Stairs  Stairs: No    Extremity/Trunk  Assessments:  RLE   RLE : Within Functional Limits  LLE   LLE : Within Functional Limits  Treatments:  Ambulation/Gait Training  Ambulation/Gait Training Performed: Yes  Ambulation/Gait Training 1  Surface 1: Level tile  Device 1: Rolling walker  Assistance 1: Minimum assistance  Quality of Gait 1: Narrow base of support, Decreased step length, Forward flexed posture (significantly decreased mishel)  Comments/Distance (ft) 1: 10' x 2; 30' x 2  Outcome Measures:  [facility] Basic Mobility  Turning from your back to your side while in a flat bed without using bedrails: A little  Moving from lying on your back to sitting on the side of a flat bed without using bedrails: A little  Moving to and from bed to chair (including a wheelchair): A little  Standing up from a chair using your arms (e.g. wheelchair or bedside chair): A little  To walk in hospital room: A little  Climbing 3-5 steps with railing: A lot  Basic Mobility - Total Score: 17    Encounter Problems       Encounter Problems (Active)       Mobility       STG - Patient will ambulate 100' with use of rolling walker and modified independence. (Progressing)       Start:  06/05/24    Expected End:  07/05/24            STG - Patient will ascend and descend four to six stairs with use of unilateral handrail and supervision for safety. (Not Progressing)       Start:  06/05/24    Expected End:  07/04/24               PT Transfers       STG - Patient to transfer to and from sit to supine with independence. (Progressing)       Start:  06/05/24    Expected End:  07/05/24            STG - Patient will transfer sit to and from stand with use of rolling walker and modified independence. (Progressing)       Start:  06/05/24    Expected End:  07/05/24               Pain          Safety       STG - Patient uses call light consistently to request assistance with transfers (Progressing)       Start:  06/05/24    Expected End:  07/04/24                   Education  Documentation  Mobility Training, taught by Fabi Milan, PT at 6/5/2024  9:35 AM.  Learner: Patient  Readiness: Acceptance  Method: Explanation, Demonstration  Response: Needs Reinforcement    Education Comments  No comments found.

## 2024-06-05 NOTE — PROGRESS NOTES
Occupational Therapy    Evaluation/Treatment    Patient Name: Indu Red  MRN: [id number]  : 1950  Today's Date: 24  Time Calculation  Start Time: 814  Stop Time: 835  Time Calculation (min): 21 min       Assessment:  OT Assessment: OT order received, chart reviewed, evaluation completed. Pt is a 73 year-old F admitted from home with c/o weakness/fatigue; h/o recent hospitalization d/t syncopal episode and fall. Pt demonstrated impaired ADLs and functional mobility requiring min A and would benefit from acute OT services.  Prognosis: Good  Barriers to Discharge: Decreased caregiver support  Evaluation/Treatment Tolerance: Patient limited by fatigue, Patient limited by pain  Medical Staff Made Aware: Yes  End of Session Communication: Bedside nurse  End of Session Patient Position: Up in chair, Alarm on  OT Assessment Results: Decreased ADL status, Decreased upper extremity strength, Decreased upper extremity range of motion, Decreased safe judgment during ADL, Decreased endurance, Decreased functional mobility, Decreased IADLs  Prognosis: Good  Barriers to Discharge: Decreased caregiver support  Evaluation/Treatment Tolerance: Patient limited by fatigue, Patient limited by pain  Medical Staff Made Aware: Yes  Strengths: Ability to acquire knowledge  Barriers to Participation: Comorbidities  Plan:  Treatment Interventions: ADL retraining, Functional transfer training, UE strengthening/ROM, Endurance training, Patient/family training, Equipment evaluation/education, Compensatory technique education  OT Frequency: 3 times per week  OT Discharge Recommendations: Moderate intensity level of continued care  Equipment Recommended upon Discharge: Wheeled walker  OT Recommended Transfer Status: Minimal assist  OT - OK to Discharge: Yes  Treatment Interventions: ADL retraining, Functional transfer training, UE strengthening/ROM, Endurance training, Patient/family training, Equipment evaluation/education,  Compensatory technique education    Subjective   Current Problem:    General:   OT Received On: 06/05/24  General  Reason for Referral: activities of daily living, pt is a 73 year-old F admitted from home with c/o weakness/fatigue; h/o recent hospitalization d/t syncopal episode and fall.  Referred By: Dr. Joaquín MD  Past Medical History Relevant to Rehab: CKD, DJD, GERD, MS, OA, OP, PVD, TIA, anemia.  Family/Caregiver Present: No  Co-Treatment: PT  Co-Treatment Reason: co-evaluation to optimize pt outcomes/safety  Prior to Session Communication: Bedside nurse  Patient Position Received: Bed, 3 rail up, Alarm off, not on at start of session  Preferred Learning Style: verbal  General Comment: Pt cleared for therapy via RN, received in supine, NAD, agreeable to participate in therapy. (+) telemetry  Precautions:  Hearing/Visual Limitations: reading glasses; hearing WFL  Medical Precautions: Fall precautions  Vital Signs:     Pain:  Pain Assessment  Pain Assessment: 0-10  Pain Score: 8  Pain Type: Acute pain  Pain Location: Back  Pain Interventions: Repositioned  Response to Interventions: improved    Objective   Cognition:  Overall Cognitive Status: Within Functional Limits  Orientation Level: Oriented X4  Safety/Judgement:  (mild)  Processing Speed: Delayed           Home Living:  Type of Home: House  Lives With: Parent(s) (lives with mother (93 year-old))  Home Adaptive Equipment: Cane  Home Layout: One level, Laundry in basement  Home Access: Stairs to enter with rails  Entrance Stairs-Rails: Right  Entrance Stairs-Number of Steps: 3-4  Bathroom Shower/Tub: Tub/shower unit  Bathroom Toilet: Standard  Bathroom Equipment: Grab bars in shower, Shower chair with back  Home Living Comments: family local and supportive  Prior Function:  Level of [place]: Independent with ADLs and functional transfers, Needs assistance with homemaking  Receives Help From: Family  ADL Assistance: Independent  Homemaking Assistance:  Needs assistance  Driving/Transportation: Family/Friend  Homemaking Assistance Comments: assist from family with iADLs  Ambulatory Assistance: Independent (mod indep with SC prn)  Vocational: Retired  Hand Dominance: Right  Prior Function Comments: h/o two recent falls; pt reports increased difficulty with i/ADLs  IADL History:     ADL:  Eating Assistance: Independent  Grooming Assistance: Stand by  Grooming Deficit: Supervision/safety, Standing with assistive device, Wash/dry hands (washing hands standing at sink)  Bathing Assistance: Moderate  Bathing Deficit:  (projected)  UE Dressing Assistance: Minimal  UE Dressing Deficit: Thread RUE, Thread LUE, Pull around back  LE Dressing Assistance: Moderate  LE Dressing Deficit: Thread RLE into pants, Thread LLE into pants, Pull up over hips (assist to thread L LE and pull up in standing, pt able to thread R)  Toileting Assistance with Device: Minimal  Toileting Deficit: Clothing management up, Clothing management down, Perineal hygiene (assist for clothing management, pt able to manage hygiene)  Functional Assistance: Minimal    Activity Tolerance:  Endurance: Tolerates 10 - 20 min exercise with multiple rests  Activity Tolerance Comments: limited d/t c/o low back pain  Functional Standing Tolerance:     Bed Mobility/Transfers: Bed Mobility  Bed Mobility: Yes  Bed Mobility 1  Bed Mobility 1: Supine to sitting  Level of Assistance 1: Minimum assistance  Bed Mobility Comments 1: hand held asssit for trunk elevation, HOB elevated and use of rail    Transfers  Transfer: Yes  Transfer 1  Transfer From 1: Sit to  Transfer to 1: Stand  Technique 1: Sit to stand  Transfer Device 1: Walker  Transfer Level of Assistance 1: Minimum assistance  Trials/Comments 1: Cues for safe hand placement and min A for balance  Transfers 2  Transfer From 2: Stand to  Transfer to 2: Toilet  Technique 2: Stand to sit, Sit to stand  Transfer Device 2: Walker  Transfer Level of Assistance 2: Minimum  assistance  Trials/Comments 2: cues for grab bar use to and from toilet, min A for balance and safety  Transfers 3  Transfer From 3: Stand to  Transfer to 3: Chair with arms  Technique 3: Stand to sit  Transfer Device 3: Walker  Transfer Level of Assistance 3: Minimum assistance  Trials/Comments 3: min A to control descent into chair, cues for safe hand placement, needs in reach.      Functional Mobility:  Functional Mobility  Functional Mobility Performed: Yes  Functional Mobility 1  Surface 1: Level tile  Device 1: Rolling walker  Assistance 1: Minimum assistance  Comments 1: Pt performed functional mobility household distance to and from bathroom in room with min A for balance, cues for RW management and navigation, required extended time and multiple standing rest breaks, slow movements and effortful, c/o increased back pain with mobility.    Sensation:  Light Touch: No apparent deficits  Sensation Comment: pt denies paresthesias  Strength:  Strength Comments: R UE limited to 45 degrees shoulder flexion from hx of rotator cuff repair, L UE WFL    Coordination:  Movements are Fluid and Coordinated: No  Upper Body Coordination: delayed rate and accuracy of movements  Coordination Comment: decreased rate of movements   Hand Function:  Hand Function  Gross Grasp: Functional  Coordination: Functional  Extremities: RUE   RUE :  (R UE limited to 45 degrees shoulder flexion from hx of rotator cuff repair) and LUE   LUE: Within Functional Limits    Outcome Measures: [facility] Daily Activity  Putting on and taking off regular lower body clothing: A lot  Bathing (including washing, rinsing, drying): A lot  Putting on and taking off regular upper body clothing: A little  Toileting, which includes using toilet, bedpan or urinal: A little  Taking care of personal grooming such as brushing teeth: A little  Eating Meals: None  Daily Activity - Total Score: 17      Education Documentation  Body Mechanics, taught by Aurora Crook  OT at 6/5/2024  9:53 AM.  Learner: Patient  Readiness: Acceptance  Method: Explanation  Response: Verbalizes Understanding  Comment: Pt edu on OT POC and safety    Precautions, taught by Aurora Crook OT at 6/5/2024  9:53 AM.  Learner: Patient  Readiness: Acceptance  Method: Explanation  Response: Verbalizes Understanding  Comment: Pt edu on OT POC and safety    ADL Training, taught by Aurora Crook OT at 6/5/2024  9:53 AM.  Learner: Patient  Readiness: Acceptance  Method: Explanation  Response: Verbalizes Understanding  Comment: Pt edu on OT POC and safety    Goals:  Encounter Problems       Encounter Problems (Active)       OT Goals       ADLs (Progressing)       Start:  06/05/24    Expected End:  06/28/24       Pt will complete ADL tasks with Mod I, using AE as needed, in order to complete self-care tasks.           Functional mobility       Start:  06/05/24    Expected End:  06/28/24       Pt will perform functional mobility household distance with RW mod ind.             Functional transfers       Start:  06/05/24    Expected End:  06/28/24       Pt will perform functional transfers at mod ind level with RW

## 2024-06-05 NOTE — PROGRESS NOTES
[facility] met with pt again who was on the phone with her daughter who she put on speaker phone. Dtr expressing concern with pt returning home, states she assists pt but that she works and pts other family members are around pts age and can only help so much and expressing belief that pt should go to SNF. [facility] explained the results of pts pt eval, also explained that at a SNF pt will get around 45 minutes of PT per day and not on the weekends. Pt now conflicted about plan, ok for [facility] to sent SNF referrals to [facility] and [facility], referrals sent to SNF's await responses. Precert would be needed for SNF. Attending CNP also in attendance and aware of possible change in dc plan.     Update: [facility] can accept, request made to the direct submit team for precert to be started.      06/05/24 8704   Discharge Planning   Patient expects to be discharged to: [facility] vs SNF

## 2024-06-06 ENCOUNTER — DOCUMENTATION (OUTPATIENT)
Dept: HOME HEALTH SERVICES | Facility: HOME HEALTH | Age: 74
End: 2024-06-06
Payer: COMMERCIAL

## 2024-06-06 ENCOUNTER — APPOINTMENT (OUTPATIENT)
Dept: ORTHOPEDIC SURGERY | Facility: CLINIC | Age: 74
End: 2024-06-06
Payer: COMMERCIAL

## 2024-06-06 VITALS
DIASTOLIC BLOOD PRESSURE: 90 MMHG | OXYGEN SATURATION: 100 % | RESPIRATION RATE: 16 BRPM | BODY MASS INDEX: 23.9 KG/M2 | HEIGHT: 63 IN | TEMPERATURE: 97.7 F | HEART RATE: 60 BPM | WEIGHT: 134.9 LBS | SYSTOLIC BLOOD PRESSURE: 115 MMHG

## 2024-06-06 PROBLEM — R53.1 GENERALIZED WEAKNESS: Status: RESOLVED | Noted: 2024-06-04 | Resolved: 2024-06-06

## 2024-06-06 LAB
ANION GAP SERPL CALC-SCNC: 7 MMOL/L
BACTERIA UR CULT: NORMAL
BASOPHILS # BLD AUTO: 0.06 X10*3/UL (ref 0–0.1)
BASOPHILS NFR BLD AUTO: 0.9 %
BUN SERPL-MCNC: 9 MG/DL (ref 8–25)
CALCIUM SERPL-MCNC: 9.1 MG/DL (ref 8.5–10.4)
CHLORIDE SERPL-SCNC: 110 MMOL/L (ref 97–107)
CO2 SERPL-SCNC: 24 MMOL/L (ref 24–31)
CREAT SERPL-MCNC: 1.1 MG/DL (ref 0.4–1.6)
EGFRCR SERPLBLD CKD-EPI 2021: 53 ML/MIN/1.73M*2
EOSINOPHIL # BLD AUTO: 0.11 X10*3/UL (ref 0–0.4)
EOSINOPHIL NFR BLD AUTO: 1.6 %
ERYTHROCYTE [DISTWIDTH] IN BLOOD BY AUTOMATED COUNT: 14.6 % (ref 11.5–14.5)
GLUCOSE SERPL-MCNC: 86 MG/DL (ref 65–99)
HCT VFR BLD AUTO: 28.2 % (ref 36–46)
HGB BLD-MCNC: 9.5 G/DL (ref 12–16)
IMM GRANULOCYTES # BLD AUTO: 0.02 X10*3/UL (ref 0–0.5)
IMM GRANULOCYTES NFR BLD AUTO: 0.3 % (ref 0–0.9)
LYMPHOCYTES # BLD AUTO: 1.5 X10*3/UL (ref 0.8–3)
LYMPHOCYTES NFR BLD AUTO: 22.1 %
MCH RBC QN AUTO: 27.2 PG (ref 26–34)
MCHC RBC AUTO-ENTMCNC: 33.7 G/DL (ref 32–36)
MCV RBC AUTO: 81 FL (ref 80–100)
MONOCYTES # BLD AUTO: 0.7 X10*3/UL (ref 0.05–0.8)
MONOCYTES NFR BLD AUTO: 10.3 %
NEUTROPHILS # BLD AUTO: 4.4 X10*3/UL (ref 1.6–5.5)
NEUTROPHILS NFR BLD AUTO: 64.8 %
NRBC BLD-RTO: 0 /100 WBCS (ref 0–0)
PLATELET # BLD AUTO: 293 X10*3/UL (ref 150–450)
POTASSIUM SERPL-SCNC: 3.5 MMOL/L (ref 3.4–5.1)
RBC # BLD AUTO: 3.49 X10*6/UL (ref 4–5.2)
SODIUM SERPL-SCNC: 141 MMOL/L (ref 133–145)
WBC # BLD AUTO: 6.8 X10*3/UL (ref 4.4–11.3)

## 2024-06-06 PROCEDURE — 97116 GAIT TRAINING THERAPY: CPT | Mod: GP

## 2024-06-06 PROCEDURE — 36415 COLL VENOUS BLD VENIPUNCTURE: CPT

## 2024-06-06 PROCEDURE — 2500000002 HC RX 250 W HCPCS SELF ADMINISTERED DRUGS (ALT 637 FOR MEDICARE OP, ALT 636 FOR OP/ED)

## 2024-06-06 PROCEDURE — 85025 COMPLETE CBC W/AUTO DIFF WBC: CPT

## 2024-06-06 PROCEDURE — 2500000001 HC RX 250 WO HCPCS SELF ADMINISTERED DRUGS (ALT 637 FOR MEDICARE OP)

## 2024-06-06 PROCEDURE — 80048 BASIC METABOLIC PNL TOTAL CA: CPT

## 2024-06-06 PROCEDURE — 97110 THERAPEUTIC EXERCISES: CPT | Mod: GP

## 2024-06-06 PROCEDURE — 2500000004 HC RX 250 GENERAL PHARMACY W/ HCPCS (ALT 636 FOR OP/ED)

## 2024-06-06 RX ORDER — POTASSIUM CHLORIDE 750 MG/1
10 TABLET, FILM COATED, EXTENDED RELEASE ORAL DAILY
Start: 2024-06-06 | End: 2024-07-06

## 2024-06-06 RX ORDER — IBUPROFEN 200 MG
1 TABLET ORAL DAILY
Start: 2024-06-06 | End: 2024-07-06

## 2024-06-06 RX ORDER — POTASSIUM CHLORIDE 20 MEQ/1
40 TABLET, EXTENDED RELEASE ORAL DAILY
Status: DISCONTINUED | OUTPATIENT
Start: 2024-06-06 | End: 2024-06-06 | Stop reason: HOSPADM

## 2024-06-06 RX ADMIN — LISINOPRIL 5 MG: 5 TABLET ORAL at 08:51

## 2024-06-06 RX ADMIN — POTASSIUM CHLORIDE 40 MEQ: 1500 TABLET, EXTENDED RELEASE ORAL at 08:51

## 2024-06-06 RX ADMIN — ASPIRIN 81 MG: 81 TABLET, COATED ORAL at 08:51

## 2024-06-06 RX ADMIN — CHOLECALCIFEROL TAB 25 MCG (1000 UNIT) 1000 UNITS: 25 TAB at 08:51

## 2024-06-06 RX ADMIN — METOPROLOL SUCCINATE 25 MG: 25 TABLET, EXTENDED RELEASE ORAL at 08:51

## 2024-06-06 RX ADMIN — HEPARIN SODIUM 5000 UNITS: 5000 INJECTION, SOLUTION INTRAVENOUS; SUBCUTANEOUS at 08:51

## 2024-06-06 RX ADMIN — DOCUSATE SODIUM 100 MG: 100 CAPSULE, LIQUID FILLED ORAL at 08:51

## 2024-06-06 RX ADMIN — VENLAFAXINE 25 MG: 25 TABLET ORAL at 08:53

## 2024-06-06 RX ADMIN — LEVOTHYROXINE SODIUM 50 MCG: 0.05 TABLET ORAL at 06:36

## 2024-06-06 RX ADMIN — AMLODIPINE BESYLATE 5 MG: 5 TABLET ORAL at 08:51

## 2024-06-06 RX ADMIN — PANTOPRAZOLE SODIUM 40 MG: 40 TABLET, DELAYED RELEASE ORAL at 08:51

## 2024-06-06 ASSESSMENT — COGNITIVE AND FUNCTIONAL STATUS - GENERAL
MOVING TO AND FROM BED TO CHAIR: A LOT
TURNING FROM BACK TO SIDE WHILE IN FLAT BAD: A LITTLE
WALKING IN HOSPITAL ROOM: A LITTLE
MOVING FROM LYING ON BACK TO SITTING ON SIDE OF FLAT BED WITH BEDRAILS: A LITTLE
DAILY ACTIVITIY SCORE: 17
CLIMB 3 TO 5 STEPS WITH RAILING: A LOT
STANDING UP FROM CHAIR USING ARMS: A LOT
DRESSING REGULAR LOWER BODY CLOTHING: A LOT
DRESSING REGULAR UPPER BODY CLOTHING: A LITTLE
STANDING UP FROM CHAIR USING ARMS: A LITTLE
HELP NEEDED FOR BATHING: A LOT
TURNING FROM BACK TO SIDE WHILE IN FLAT BAD: A LITTLE
TOILETING: A LITTLE
CLIMB 3 TO 5 STEPS WITH RAILING: A LOT
MOVING FROM LYING ON BACK TO SITTING ON SIDE OF FLAT BED WITH BEDRAILS: A LITTLE
PERSONAL GROOMING: A LITTLE
MOVING TO AND FROM BED TO CHAIR: A LITTLE
MOBILITY SCORE: 17

## 2024-06-06 ASSESSMENT — PAIN SCALES - GENERAL
PAINLEVEL_OUTOF10: 0 - NO PAIN
PAINLEVEL_OUTOF10: 0 - NO PAIN

## 2024-06-06 ASSESSMENT — PAIN - FUNCTIONAL ASSESSMENT: PAIN_FUNCTIONAL_ASSESSMENT: 0-10

## 2024-06-06 NOTE — PROGRESS NOTES
[facility] spoke [facility] who confirmed she will go to SNF. Facility made aware pt is anticipated to dc today.     Pending: DC order, completed goldenrod, set up transport/make facility aware of dc time.     06/06/24 0950   Discharge Planning   Patient expects to be discharged to: [facility]   Does the patient need discharge transport arranged? Yes   RoundTrip coordination needed? Yes  (Wheelchair)   Has discharge transport been arranged? No   What day is the transport expected? 06/06/24

## 2024-06-06 NOTE — DISCHARGE INSTRUCTIONS
Repeat BMP tomorrow 6/7/2024    Follow-up with PCP in 1 to 2 weeks  Smoking cessation is highly advised.

## 2024-06-06 NOTE — CARE PLAN
The patient's goals for the shift include To have her back pain addressed.    The clinical goals for the shift include safety, stable VS, mobilize patient    Over the shift, the patient did make progress toward the following goals.

## 2024-06-06 NOTE — PROGRESS NOTES
06/06/24 1212   Discharge Planning   Patient expects to be discharged to: [facility]   Does the patient need discharge transport arranged? Yes   RoundTrip coordination needed? Yes   Has discharge transport been arranged? Yes   What day is the transport expected? 06/06/24   What time is the transport expected? 3454

## 2024-06-06 NOTE — DISCHARGE SUMMARY
Discharge Diagnosis  Hypokalemia  Generalized weakness    Issues Requiring Follow-Up  Repeat BMP tomorrow 6/7/2024    Discharge Meds     Your medication list        START taking these medications        Instructions Last Dose Given Next Dose Due   nicotine 14 mg/24 hr patch  Commonly known as: Nicoderm CQ      Place 1 patch over 24 hours on the skin once daily.       potassium chloride CR 10 mEq ER tablet  Commonly known as: Klor-Con      Take 1 tablet (10 mEq) by mouth once daily. Do not crush, chew, or split.              CONTINUE taking these medications        Instructions Last Dose Given Next Dose Due   albuterol 90 mcg/actuation inhaler           alendronate 70 mg tablet  Commonly known as: Fosamax           amLODIPine 5 mg tablet  Commonly known as: Norvasc           aspirin 81 mg EC tablet           AUBAGIO ORAL           buPROPion  mg 12 hr tablet  Commonly known as: Wellbutrin SR           diclofenac sodium 1 % gel  Commonly known as: Voltaren      Apply 4.5 inches (4 g) topically 2 times a day as needed (pain).       dimethyl fumarate 120 mg capsule,delayed release(DR/EC) capsule  Commonly known as: Tecfidera           fluticasone 50 mcg/actuation nasal spray  Commonly known as: Flonase           Forteo injection  Generic drug: teriparatide           ipratropium 42 mcg (0.06 %) nasal spray  Commonly known as: Atrovent           levothyroxine 50 mcg tablet  Commonly known as: Synthroid, Levoxyl      TAKE 1 TABLET BY MOUTH ONCE DAILY.       lisinopril 5 mg tablet           metoprolol succinate XL 25 mg 24 hr tablet  Commonly known as: Toprol-XL           pantoprazole 40 mg EC tablet  Commonly known as: ProtoNix      Take 1 tablet (40 mg) by mouth once daily in the morning. Take before meals.       Pepcid AC 10 mg tablet  Generic drug: famotidine           tiZANidine 4 mg tablet  Commonly known as: Zanaflex      TAKE 1 TABLET BY MOUTH THREE TIMES A DAY AS NEEDED       traMADol 50 mg tablet  Commonly  "known as: Ultram           venlafaxine 25 mg tablet  Commonly known as: Effexor      TAKE 1 TABLET BY MOUTH TWICE A DAY       Vitamin D3 25 MCG (1000 UT) tablet  Generic drug: cholecalciferol                  STOP taking these medications      atorvastatin 40 mg tablet  Commonly known as: Lipitor        cetirizine 10 mg tablet  Commonly known as: ZyrTEC        gabapentin 300 mg capsule  Commonly known as: Neurontin        lisinopriL-hydrochlorothiazide 10-12.5 mg tablet        rosuvastatin 40 mg tablet  Commonly known as: Crestor        valACYclovir 500 mg tablet  Commonly known as: Valtrex                  Where to Get Your Medications        Information about where to get these medications is not yet available    Ask your nurse or doctor about these medications  nicotine 14 mg/24 hr patch  potassium chloride CR 10 mEq ER tablet         Test Results Pending At Discharge  Pending Labs       Order Current Status    Urine culture In process            Hospital Course  73-year-old female, admitted 6/4/2024 for generalized weakness.  On admission with hypokalemia.  Received replacement potassium supplementation and IV fluids.  Discontinued statins and hydrochlorothiazide.  Patient advised.  Now with marked improvement of symptoms.  Resolved hypokalemia.  Markedly improved mobility.  Patient states she is feeling \"a lot better\".  Has been accepted to [facility] skilled nursing facility for further rehabilitation.    Pertinent Physical Exam At Time of Discharge  Physical Exam  Vitals and nursing note reviewed.   Constitutional:       Appearance: Normal appearance. She is normal weight.   HENT:      Head: Normocephalic and atraumatic.      Mouth/Throat:      Mouth: Mucous membranes are moist.   Eyes:      Extraocular Movements: Extraocular movements intact.      Pupils: Pupils are equal, round, and reactive to light.   Cardiovascular:      Rate and Rhythm: Normal rate and regular rhythm.      Pulses: Normal " pulses.      Heart sounds: Normal heart sounds.   Pulmonary:      Effort: Pulmonary effort is normal.      Breath sounds: Normal breath sounds.   Abdominal:      General: Bowel sounds are normal.      Palpations: Abdomen is soft.   Musculoskeletal:         General: Normal range of motion.      Cervical back: Normal range of motion and neck supple.   Skin:     General: Skin is warm and dry.      Capillary Refill: Capillary refill takes less than 2 seconds.   Neurological:      General: No focal deficit present.      Mental Status: She is alert and oriented to person, place, and time. Mental status is at baseline.   Psychiatric:         Mood and Affect: Mood normal.         Behavior: Behavior normal.         Outpatient Follow-Up  Future Appointments   Date Time Provider Department Center   6/11/2024  1:15 PM Reza Hansen OD [id number] [facility]   6/12/2024  1:20 PM Don Jett MD [id number] [facility]   8/26/2024  2:15 PM Kev Reyna DO [id number] [facility]   9/9/2024 11:20 AM Don Jett MD [id number] [facility]     Follow-up PCP in 1-2 weeks  Repeat BMP tomorrow 6/7/2024    Corinna Luna, APRN-CNP

## 2024-06-06 NOTE — PROGRESS NOTES
Physical Therapy    Physical Therapy Treatment    Patient Name: Indu Red  MRN: [id number]  Today's Date: 6/6/2024  Time Calculation  Start Time: 1015  Stop Time: 1039  Time Calculation (min): 24 min    Assessment/Plan   PT Assessment  PT Assessment Results: Decreased strength, Decreased endurance, Decreased mobility, Impaired balance, Impaired judgement, Decreased safety awareness  Rehab Prognosis: Good  Evaluation/Treatment Tolerance: Patient tolerated treatment well  Medical Staff Made Aware: Yes  Strengths: Ability to acquire knowledge, Support of extended family/friends  Barriers to Participation: Comorbidities  End of Session Communication: Bedside nurse  Assessment Comment: Significantly improved gait quality/tolerance, increased time to complete all tasks, pleasant/cooperative throughout; remains a falls risk.  End of Session Patient Position: Up in chair, Alarm on  PT Plan  Inpatient/Swing Bed or Outpatient: Inpatient  PT Plan  Treatment/Interventions: Bed mobility, Transfer training, Gait training, Stair training, Balance training, Neuromuscular re-education, Strengthening, Endurance training, Therapeutic exercise, Therapeutic activity  PT Plan: Skilled PT  PT Frequency: 4 times per week  PT Discharge Recommendations: Moderate intensity level of continued care  Equipment Recommended upon Discharge: Wheeled walker  PT Recommended Transfer Status: Assist x1  PT - OK to Discharge: Yes    General Visit Information:   PT  Visit  PT Received On: 06/06/24  Response to Previous Treatment: Patient with no complaints from previous session.  General  Reason for Referral: impaired functional mobility; pt is a 73 year-old F admitted from home with c/o weakness/fatigue; h/o recent hospitalization d/t syncopal episode and fall.  Referred By: Dr. Joaquín MD  Past Medical History Relevant to Rehab: CKD, DJD, GERD, MS, OA, OP, PVD, TIA, anemia.  Family/Caregiver Present: No  Co-Treatment:  (no)  Co-Treatment Reason:  n/a  Prior to Session Communication: Bedside nurse  Patient Position Received: Up in chair, Alarm off, not on at start of session  Preferred Learning Style: verbal  General Comment: Pt cleared for therapy via RN, received in supine, NAD, agreeable to participate in therapy. (+) telemetry    Subjective   Precautions:  Precautions  Hearing/Visual Limitations: reading glasses; hearing WFL  Medical Precautions: Fall precautions    Objective   Pain:  Pain Assessment  Pain Assessment: 0-10  Pain Score: 0 - No pain  Cognition:  Cognition  Overall Cognitive Status: Within Functional Limits  Orientation Level: Oriented X4  Insight: Mild  Coordination:  Movements are Fluid and Coordinated: Yes  Postural Control:  Postural Control  Postural Control: Impaired  Posture Comment: forward head, rounded shoulders  Static Sitting Balance  Static Sitting-Balance Support: Feet supported  Static Sitting-Level of Assistance: Modified independent  Static Standing Balance  Static Standing-Balance Support: Bilateral upper extremity supported  Static Standing-Level of Assistance: Close supervision  Extremity/Trunk Assessments:  RLE   RLE : Within Functional Limits  LLE   LLE : Within Functional Limits  Activity Tolerance:  Activity Tolerance  Endurance: Tolerates 10 - 20 min exercise with multiple rests  Treatments:  Therapeutic Exercise  Therapeutic Exercise Performed: Yes (signficantly increased time to complete all therex; intermittent cueing to remain on task)  Therapeutic Exercise Activity 1: B ankle pumps x 15  Therapeutic Exercise Activity 2: B seated knee ext x 15  Therapeutic Exercise Activity 3: B seated hip flex x 15    Therapeutic Activity  Therapeutic Activity Performed: Yes  Therapeutic Activity 1: stand <> sit on toilet with supervision for safety; supervision for static/dynamic balance in bathroom for hygiene tasks    Bed Mobility  Bed Mobility: No    Ambulation/Gait Training  Ambulation/Gait Training Performed:  Yes  Ambulation/Gait Training 1  Surface 1: Level tile  Device 1: Rolling walker  Assistance 1: Contact guard  Quality of Gait 1: Narrow base of support, Decreased step length, Forward flexed posture (significantly decreased mishel, reciprocating pattern; cueing to keep BUE on RW at all times during ambulation)  Comments/Distance (ft) 1: 40' x 4  Transfers  Transfer: Yes  Transfer 1  Transfer From 1: Sit to  Transfer to 1: Stand  Technique 1: Sit to stand  Transfer Device 1: Walker  Transfer Level of Assistance 1: Close supervision  Transfers 2  Transfer From 2: Stand to  Transfer to 2: Sit  Technique 2: Stand to sit  Transfer Device 2: Walker  Transfer Level of Assistance 2: Close supervision  Trials/Comments 2: good eccentric lowering noted to sit    Stairs  Stairs: No    Outcome Measures:  [facility] Basic Mobility  Turning from your back to your side while in a flat bed without using bedrails: A little  Moving from lying on your back to sitting on the side of a flat bed without using bedrails: A little  Moving to and from bed to chair (including a wheelchair): A little  Standing up from a chair using your arms (e.g. wheelchair or bedside chair): A little  To walk in hospital room: A little  Climbing 3-5 steps with railing: A lot  Basic Mobility - Total Score: 17    Education Documentation  Mobility Training, taught by Fabi Milan PT at 6/6/2024 10:56 AM.  Learner: Patient  Readiness: Acceptance  Method: Explanation, Demonstration  Response: Needs Reinforcement    Education Comments  No comments found.      OP EDUCATION:  Outpatient Education  Individual(s) Educated: Patient  Education Provided: Fall Risk, POC  Risk and Benefits Discussed with Patient/Caregiver/Other: yes  Patient/Caregiver Demonstrated Understanding: yes  Plan of Care Discussed and Agreed Upon: yes  Patient Response to Education: Patient/Caregiver Verbalized Understanding of Information    Encounter Problems       Encounter Problems (Active)        Mobility       STG - Patient will ambulate 100' with use of rolling walker and modified independence. (Progressing)       Start:  06/05/24    Expected End:  07/05/24            STG - Patient will ascend and descend four to six stairs with use of unilateral handrail and supervision for safety. (Not Progressing)       Start:  06/05/24    Expected End:  07/04/24               PT Transfers       STG - Patient to transfer to and from sit to supine with independence. (Progressing)       Start:  06/05/24    Expected End:  07/05/24            STG - Patient will transfer sit to and from stand with use of rolling walker and modified independence. (Progressing)       Start:  06/05/24    Expected End:  07/05/24               Pain          Pain - Adult          Safety       STG - Patient uses call light consistently to request assistance with transfers (Progressing)       Start:  06/05/24    Expected End:  07/04/24

## 2024-06-06 NOTE — HH CARE COORDINATION
This referral has been made a CANCEL with  Home Care due to  discharging to a SNF instead of home with home care.  . If you have further questions, feel free to reach out to our office at [phone]. Thank you, [facility] Intake.

## 2024-06-12 ENCOUNTER — APPOINTMENT (OUTPATIENT)
Dept: PRIMARY CARE | Facility: CLINIC | Age: 74
End: 2024-06-12
Payer: COMMERCIAL

## 2024-06-18 ENCOUNTER — HOSPITAL ENCOUNTER (OUTPATIENT)
Dept: RADIOLOGY | Facility: CLINIC | Age: 74
Discharge: HOME | End: 2024-06-18
Payer: COMMERCIAL

## 2024-06-18 ENCOUNTER — OFFICE VISIT (OUTPATIENT)
Dept: ORTHOPEDIC SURGERY | Facility: CLINIC | Age: 74
End: 2024-06-18
Payer: COMMERCIAL

## 2024-06-18 VITALS — WEIGHT: 125 LBS | HEIGHT: 63 IN | BODY MASS INDEX: 22.15 KG/M2

## 2024-06-18 DIAGNOSIS — S86.011A ACHILLES TENDON SPRAIN, RIGHT, INITIAL ENCOUNTER: ICD-10-CM

## 2024-06-18 DIAGNOSIS — M25.511 RIGHT SHOULDER PAIN, UNSPECIFIED CHRONICITY: Primary | ICD-10-CM

## 2024-06-18 DIAGNOSIS — Z98.890 S/P RIGHT ROTATOR CUFF REPAIR: ICD-10-CM

## 2024-06-18 DIAGNOSIS — R52 PAIN: ICD-10-CM

## 2024-06-18 DIAGNOSIS — S93.401A MODERATE RIGHT ANKLE SPRAIN, INITIAL ENCOUNTER: ICD-10-CM

## 2024-06-18 PROCEDURE — 1111F DSCHRG MED/CURRENT MED MERGE: CPT | Performed by: ORTHOPAEDIC SURGERY

## 2024-06-18 PROCEDURE — 1159F MED LIST DOCD IN RCRD: CPT | Performed by: ORTHOPAEDIC SURGERY

## 2024-06-18 PROCEDURE — 99213 OFFICE O/P EST LOW 20 MIN: CPT | Performed by: ORTHOPAEDIC SURGERY

## 2024-06-18 PROCEDURE — 73610 X-RAY EXAM OF ANKLE: CPT | Mod: RT

## 2024-06-18 PROCEDURE — 4004F PT TOBACCO SCREEN RCVD TLK: CPT | Performed by: ORTHOPAEDIC SURGERY

## 2024-06-18 PROCEDURE — 73610 X-RAY EXAM OF ANKLE: CPT | Mod: RIGHT SIDE | Performed by: ORTHOPAEDIC SURGERY

## 2024-06-18 PROCEDURE — 1125F AMNT PAIN NOTED PAIN PRSNT: CPT | Performed by: ORTHOPAEDIC SURGERY

## 2024-06-18 PROCEDURE — 1160F RVW MEDS BY RX/DR IN RCRD: CPT | Performed by: ORTHOPAEDIC SURGERY

## 2024-06-18 ASSESSMENT — LIFESTYLE VARIABLES
HAS A RELATIVE, FRIEND, DOCTOR, OR ANOTHER HEALTH PROFESSIONAL EXPRESSED CONCERN ABOUT YOUR DRINKING OR SUGGESTED YOU CUT DOWN: NO
HOW OFTEN DURING THE LAST YEAR HAVE YOU HAD A FEELING OF GUILT OR REMORSE AFTER DRINKING: NEVER
HOW OFTEN DO YOU HAVE SIX OR MORE DRINKS ON ONE OCCASION: NEVER
HOW OFTEN DURING THE LAST YEAR HAVE YOU FAILED TO DO WHAT WAS NORMALLY EXPECTED FROM YOU BECAUSE OF DRINKING: NEVER
HOW OFTEN DURING THE LAST YEAR HAVE YOU NEEDED AN ALCOHOLIC DRINK FIRST THING IN THE MORNING TO GET YOURSELF GOING AFTER A NIGHT OF HEAVY DRINKING: NEVER
HOW MANY STANDARD DRINKS CONTAINING ALCOHOL DO YOU HAVE ON A TYPICAL DAY: PATIENT DOES NOT DRINK
AUDIT-C TOTAL SCORE: 0
HAVE YOU OR SOMEONE ELSE BEEN INJURED AS A RESULT OF YOUR DRINKING: NO
SKIP TO QUESTIONS 9-10: 1
HOW OFTEN DO YOU HAVE A DRINK CONTAINING ALCOHOL: NEVER
HOW OFTEN DURING THE LAST YEAR HAVE YOU BEEN UNABLE TO REMEMBER WHAT HAPPENED THE NIGHT BEFORE BECAUSE YOU HAD BEEN DRINKING: NEVER
HOW OFTEN DURING THE LAST YEAR HAVE YOU FOUND THAT YOU WERE NOT ABLE TO STOP DRINKING ONCE YOU HAD STARTED: NEVER
AUDIT TOTAL SCORE: 0

## 2024-06-18 ASSESSMENT — PATIENT HEALTH QUESTIONNAIRE - PHQ9
SUM OF ALL RESPONSES TO PHQ9 QUESTIONS 1 AND 2: 0
1. LITTLE INTEREST OR PLEASURE IN DOING THINGS: NOT AT ALL
2. FEELING DOWN, DEPRESSED OR HOPELESS: NOT AT ALL

## 2024-06-18 ASSESSMENT — COLUMBIA-SUICIDE SEVERITY RATING SCALE - C-SSRS
2. HAVE YOU ACTUALLY HAD ANY THOUGHTS OF KILLING YOURSELF?: NO
6. HAVE YOU EVER DONE ANYTHING, STARTED TO DO ANYTHING, OR PREPARED TO DO ANYTHING TO END YOUR LIFE?: NO
1. IN THE PAST MONTH, HAVE YOU WISHED YOU WERE DEAD OR WISHED YOU COULD GO TO SLEEP AND NOT WAKE UP?: NO

## 2024-06-18 ASSESSMENT — PAIN SCALES - GENERAL
PAINLEVEL: 10-WORST PAIN EVER
PAINLEVEL_OUTOF10: 10 - WORST POSSIBLE PAIN

## 2024-06-18 ASSESSMENT — PAIN - FUNCTIONAL ASSESSMENT: PAIN_FUNCTIONAL_ASSESSMENT: 0-10

## 2024-06-18 ASSESSMENT — ENCOUNTER SYMPTOMS
DEPRESSION: 0
LOSS OF SENSATION IN FEET: 0
OCCASIONAL FEELINGS OF UNSTEADINESS: 0

## 2024-06-18 ASSESSMENT — PAIN DESCRIPTION - DESCRIPTORS: DESCRIPTORS: PRESSURE

## 2024-06-18 NOTE — PROGRESS NOTES
Subjective      Past Surgical History:   Procedure Laterality Date    APPENDECTOMY  08/03/2016    COLONOSCOPY  08/03/2016    With polypectomy    ESOPHAGOGASTRODUODENOSCOPY      FOOT SURGERY Left     MR HEAD ANGIO WO IV CONTRAST  07/21/2023    MR HEAD ANGIO WO IV CONTRAST 7/21/2023 [facility]    MR NECK ANGIO WO IV CONTRAST  07/21/2023    MR NECK ANGIO WO IV CONTRAST 7/21/2023 [facility] MRI    OOPHORECTOMY  08/03/2016    OTHER SURGICAL HISTORY  08/03/2016    Cervical Conization    SALPINGECTOMY      TEMPOROMANDIBULAR JOINT SURGERY      THYROIDECTOMY, PARTIAL      Substernal    TOTAL VAGINAL HYSTERECTOMY  03/09/2017      Patient History      This patient is s/p right rotator cuff repair done by myself on 2-. They present today and state that their right shoulder pain is 4/10.  She is slowly resuming their normal activities of daily living. She was working with outpatient physical therapy. She suffered a syncopal episode on 6-4-2024 and was admitted to the hospital. She was diagnosed with hypokalemia. She does note right ankle pain on exam today. She is seen today sitting in a wheelchair. She denied chest pain, shortness of breath.     There were no vitals taken for this visit.     ROS  CARDIOLOGY:   Negative for chest pain, shortness of breath.   RESPIRATORY:   Negative for chest pain, shortness of breath.   MUSCULOSKELETAL:   See HPI for details.   NEUROLOGY:   Negative for tingling, numbness, weakness.      Physical exam: Right shoulder: incision is clean dry and well healing. The right upper extremity is in good position. Patient is able to complete ROM in small circles with no pain. She has painless ROM in active flexion from 0-110 and active abduction from 0-110 degrees. She is able to initiate active flexion and active abduction.  compartments are soft. Neurovascular is intact. Right ankle: There is diffuse tenderness at the right ankle medially and laterally. Nontender in the right calf. Neurovascular is intact.  [place] is negative and equal bilaterally.    Right foot x-rays from 6- shows   No acute fracture or dislocation. Scattered mild to moderate degenerative   changes at the IP joints. Mild-moderate first MTP joint degenerative   change. Scattered mild degenerative changes at the midfoot, most   pronounced at the talonavicular joint. Tiny plantar calcaneal spurs.   Small amount of linear calcification at the Achilles tendon insertion.     Indu was seen today for post-op.    Diagnoses and all orders for this visit:  Right shoulder pain, unspecified chronicity (Primary)  S/P right rotator cuff repair  Options are discussed with the patient in detail.  The patient is given a prescription for physical therapy.  She is further instructed regarding activity modification and risk for further injury with falling or trauma, ice, provider directed at home gentle strengthening and ROM exercises, and the appropriate use of Tylenol as needed for pain with its potential adverse reactions and side effects. The patient understands. Return as needed. Please note that this report has been produced using speech recognition software.  It may contain errors related to grammar, punctuation or spelling.  Electronically signed, but not reviewed.

## 2024-06-19 ENCOUNTER — OFFICE VISIT (OUTPATIENT)
Dept: PRIMARY CARE | Facility: CLINIC | Age: 74
End: 2024-06-19
Payer: COMMERCIAL

## 2024-06-19 VITALS
DIASTOLIC BLOOD PRESSURE: 78 MMHG | WEIGHT: 129 LBS | BODY MASS INDEX: 22.85 KG/M2 | TEMPERATURE: 97.1 F | SYSTOLIC BLOOD PRESSURE: 130 MMHG | HEART RATE: 56 BPM | OXYGEN SATURATION: 98 %

## 2024-06-19 DIAGNOSIS — E78.00 HYPERCHOLESTEROLEMIA: ICD-10-CM

## 2024-06-19 DIAGNOSIS — M54.41 CHRONIC BILATERAL LOW BACK PAIN WITH BILATERAL SCIATICA: ICD-10-CM

## 2024-06-19 DIAGNOSIS — G89.29 CHRONIC BILATERAL LOW BACK PAIN WITH BILATERAL SCIATICA: ICD-10-CM

## 2024-06-19 DIAGNOSIS — G89.4 CHRONIC PAIN SYNDROME: Primary | ICD-10-CM

## 2024-06-19 DIAGNOSIS — E87.6 HYPOKALEMIA: ICD-10-CM

## 2024-06-19 DIAGNOSIS — E89.0 POSTOPERATIVE HYPOTHYROIDISM: ICD-10-CM

## 2024-06-19 DIAGNOSIS — M54.42 CHRONIC BILATERAL LOW BACK PAIN WITH BILATERAL SCIATICA: ICD-10-CM

## 2024-06-19 DIAGNOSIS — I10 BENIGN ESSENTIAL HYPERTENSION: ICD-10-CM

## 2024-06-19 PROCEDURE — 1125F AMNT PAIN NOTED PAIN PRSNT: CPT | Performed by: FAMILY MEDICINE

## 2024-06-19 PROCEDURE — 1111F DSCHRG MED/CURRENT MED MERGE: CPT | Performed by: FAMILY MEDICINE

## 2024-06-19 PROCEDURE — 1159F MED LIST DOCD IN RCRD: CPT | Performed by: FAMILY MEDICINE

## 2024-06-19 PROCEDURE — 99204 OFFICE O/P NEW MOD 45 MIN: CPT | Performed by: FAMILY MEDICINE

## 2024-06-19 PROCEDURE — 1123F ACP DISCUSS/DSCN MKR DOCD: CPT | Performed by: FAMILY MEDICINE

## 2024-06-19 PROCEDURE — 3075F SYST BP GE 130 - 139MM HG: CPT | Performed by: FAMILY MEDICINE

## 2024-06-19 PROCEDURE — 3078F DIAST BP <80 MM HG: CPT | Performed by: FAMILY MEDICINE

## 2024-06-19 PROCEDURE — 99214 OFFICE O/P EST MOD 30 MIN: CPT | Performed by: FAMILY MEDICINE

## 2024-06-19 PROCEDURE — 1160F RVW MEDS BY RX/DR IN RCRD: CPT | Performed by: FAMILY MEDICINE

## 2024-06-19 PROCEDURE — 1158F ADVNC CARE PLAN TLK DOCD: CPT | Performed by: FAMILY MEDICINE

## 2024-06-19 RX ORDER — AMLODIPINE BESYLATE 5 MG/1
5 TABLET ORAL DAILY
Qty: 90 TABLET | Refills: 1 | Status: SHIPPED | OUTPATIENT
Start: 2024-06-19 | End: 2025-06-19

## 2024-06-19 RX ORDER — ROSUVASTATIN CALCIUM 20 MG/1
20 TABLET, COATED ORAL DAILY
Qty: 100 TABLET | Refills: 3 | Status: SHIPPED | OUTPATIENT
Start: 2024-06-19 | End: 2025-07-24

## 2024-06-19 RX ORDER — LEVOTHYROXINE SODIUM 50 UG/1
50 TABLET ORAL DAILY
Qty: 90 TABLET | Refills: 1 | Status: SHIPPED | OUTPATIENT
Start: 2024-06-19

## 2024-06-19 RX ORDER — LISINOPRIL 5 MG/1
5 TABLET ORAL DAILY
Qty: 90 TABLET | Refills: 1 | Status: SHIPPED | OUTPATIENT
Start: 2024-06-19 | End: 2025-06-19

## 2024-06-19 RX ORDER — GABAPENTIN 100 MG/1
100 CAPSULE ORAL EVERY MORNING
Qty: 90 CAPSULE | Refills: 1 | Status: SHIPPED | OUTPATIENT
Start: 2024-06-19 | End: 2025-06-19

## 2024-06-19 RX ORDER — POTASSIUM CHLORIDE 750 MG/1
10 TABLET, FILM COATED, EXTENDED RELEASE ORAL DAILY
Qty: 30 TABLET | Refills: 0 | Status: SHIPPED | OUTPATIENT
Start: 2024-06-19 | End: 2024-07-19

## 2024-06-19 ASSESSMENT — PAIN SCALES - GENERAL: PAINLEVEL: 8

## 2024-06-19 NOTE — PROGRESS NOTES
History Of Present Illness  Indu Red is a 73 y.o. female presenting for Hypertension (/) and POTASSIUM LOW  .    HPI     5/19/24 had syncope episode. Admitted to [facility].  Discharged and was discontinued on gabapentin. Started on Amlodipine. Discharged on 5/21/24.    Went back to the ER on 6/4/2024 with persistent back pain.  Was admitted again.  Was treated for hypokalemia and weakness and was discharged on 6/6/2024 starting potassium 10 mill equivalent once daily.    Going back to the emergency room on 6/12/2024 for weight foot and calf pain and concerns for UTI.  Vitals in the ED were stable.  Evaluation included negative lower extremity venous duplex on the right and the nonacute x-ray of the right foot which showed degenerative changes.    Today she is accompanied by her daughter who also gives HPI.  Patient reports she still not feeling quite well.  She complains of pain in her lower back especially since gabapentin 300 mg was discontinued in the hospital.  Since she has been home she has been taking the gabapentin 300 mg again.    For the hypokalemia she needs a refill on the potassium.    She needs a refill of levothyroxine for her chronic hypothyroidism.    For essential hypertension she needs a refill of her lisinopril and amlodipine.  Adverse side effects.    She needs a refill of her Crestor for hyper lipidemia    Past Medical History  Patient Active Problem List    Diagnosis Date Noted    Moderate right ankle sprain 06/18/2024    Achilles tendon sprain, right, initial encounter 06/18/2024    Syncope and collapse 05/19/2024    Unspecified osteoarthritis, unspecified site 05/06/2024    Healthcare maintenance 02/06/2024    Medicare annual wellness visit, subsequent 02/06/2024    Acute cystitis with hematuria 02/06/2024    Anemia 02/05/2024    History of thyroid disorder 02/05/2024    Tubular adenoma of colon 02/05/2024    Urinary retention 01/08/2024    ELLIE (acute kidney injury) ([facility])  01/07/2024    Epigastric pain 01/05/2024    Hypertension 01/05/2024    Elevated troponin 01/04/2024    Nausea and vomiting 01/04/2024    Other sprain of right shoulder joint, sequela 12/19/2023    Partial nontraumatic tear of right rotator cuff 12/19/2023    Right shoulder pain 10/12/2023    Arteriosclerotic heart disease 09/21/2023    Encounter for screening mammogram for malignant neoplasm of breast 08/28/2023    Screening for malignant neoplasm of colon 08/28/2023    Nicotine dependence, cigarettes, uncomplicated 08/28/2023    Abdominal pain 08/25/2023    Abscess 08/25/2023    Adhesive capsulitis of right shoulder 08/25/2023    Age-related nuclear cataract of both eyes 08/25/2023    Age-related osteoporosis without current pathological fracture 08/25/2023    Atrophy of vagina 08/25/2023    Bilateral presbyopia 08/25/2023    Bradycardia 08/25/2023    Bursitis of left shoulder 08/25/2023    Cervical radiculitis 08/25/2023    [place] 08/25/2023    Chronic pain 08/25/2023    Chronic pain of multiple joints 08/25/2023    Allergic rhinitis 08/25/2023    Chronic kidney disease, stage 3a (Multi) 08/25/2023    Constipation due to opioid therapy 08/25/2023    Diverticulosis 08/25/2023    DJD (degenerative joint disease), cervical 08/25/2023    Dry eye syndrome of both lacrimal glands 08/25/2023    Abnormal electrocardiography 08/25/2023    GERD (gastroesophageal reflux disease) 08/25/2023    History of colon polyps 08/25/2023    Hyperchloremia 08/25/2023    Hypercholesterolemia 08/25/2023    Hyperglycemia 08/25/2023    Hypernatremia 08/25/2023    Hyperopia of both eyes 08/25/2023    Incomplete bladder emptying 08/25/2023    Insect bite of left foot 08/25/2023    Itching 08/25/2023    Knee pain 08/25/2023    Leukocytosis 08/25/2023    Low back pain 08/25/2023    Marijuana use 08/25/2023    Meibomian gland dysfunction (MGD) of left eye 08/25/2023    Multiple sclerosis (Multi) 08/25/2023    Muscle cramps 08/25/2023     Nasal sinus congestion 08/25/2023    Needs smoking cessation education 08/25/2023    Nicotine dependence 08/25/2023    Nuclear sclerosis 08/25/2023    OAB (overactive bladder) 08/25/2023    Osteoarthritis 08/25/2023    Osteopenia 08/25/2023    Osteoporosis 08/25/2023    Pain, pelvic, female 08/25/2023    Pigmented skin lesion of uncertain nature 08/25/2023    Postoperative hypothyroidism 08/25/2023    Postoperative pain 08/25/2023    PVD (posterior vitreous detachment), both eyes 08/25/2023    Radiculopathy 08/25/2023    Rash 08/25/2023    Referred otalgia of right ear 08/25/2023    Schatzki's ring 08/25/2023    Sprain of MCL (medial collateral ligament) of knee 08/25/2023    Synovial plica syndrome of left knee 08/25/2023    TIA (transient ischemic attack) 08/25/2023    TMJ pain dysfunction syndrome 08/25/2023    Tobacco use disorder 08/25/2023    Vaginal discharge 08/25/2023    Vitamin D deficiency 08/25/2023    Vitreous degeneration 08/25/2023    Wheezing without diagnosis of asthma 08/25/2023    Antalgic gait 06/16/2023    Contracture of joint of left foot 06/16/2023    Hallux varus (acquired), left foot 06/16/2023    Hammertoe of second toe of left foot 06/16/2023    Metatarsalgia of left foot 06/16/2023    Other disturbances of skin sensation 06/16/2023    Pain in right shoulder 04/10/2023    Melanocytic nevi of trunk 01/10/2018    Other seborrheic keratosis 01/10/2018    S/P right rotator cuff repair 06/21/2016    Dental abscess 07/22/2014        Medications  Current Outpatient Medications on File Prior to Visit   Medication Sig    albuterol 90 mcg/actuation inhaler Inhale 2 puffs every 6 hours if needed for wheezing or shortness of breath.    aspirin 81 mg EC tablet 1 tablet (81 mg) once every 24 hours. AM    diclofenac sodium (Voltaren) 1 % gel Apply 4.5 inches (4 g) topically 2 times a day as needed (pain).    ipratropium (Atrovent) 42 mcg (0.06 %) nasal spray 2 sprays 4 times a day as needed for rhinitis.     metoprolol succinate XL (Toprol-XL) 25 mg 24 hr tablet Take 1 tablet (25 mg) by mouth once daily. AM    nicotine (Nicoderm CQ) 14 mg/24 hr patch Place 1 patch over 24 hours on the skin once daily.    teriparatide (Forteo) injection Inject 0.08 mL (20 mcg) under the skin once daily. . REFRIGERATE AND DISCARD PEN 28 DAYS AFTER INITIAL USE.  MID AFTERNOON FOR OSTEOPOROSIS    tiZANidine (Zanaflex) 4 mg tablet TAKE 1 TABLET BY MOUTH THREE TIMES A DAY AS NEEDED    traMADol (Ultram) 50 mg tablet Take by mouth.    venlafaxine (Effexor) 25 mg tablet TAKE 1 TABLET BY MOUTH TWICE A DAY    cholecalciferol (Vitamin D3) 25 MCG (1000 UT) tablet Take 1 tablet (25 mcg) by mouth once daily. AM    teriflunomide (AUBAGIO ORAL) Take 14 mg by mouth once daily. EVERY AFTERNOON FOR TREATMENT OF MS     No current facility-administered medications on file prior to visit.        Surgical History  She has a past surgical history that includes Appendectomy (08/03/2016); Other surgical history (08/03/2016); Colonoscopy (08/03/2016); Oophorectomy (08/03/2016); Total vaginal hysterectomy (03/09/2017); MR angio neck wo IV contrast (07/21/2023); MR angio head wo IV contrast (07/21/2023); Thyroidectomy, partial; Salpingectomy; Temporomandibular joint surgery; Esophagogastroduodenoscopy; Foot surgery (Left); and Shoulder surgery (Right, 02/13/2024).     Social History  She reports that she has been smoking cigarettes. She has a 12.5 pack-year smoking history. She has been exposed to tobacco smoke. She has never used smokeless tobacco. She reports that she does not currently use alcohol. She reports current drug use. Frequency: 1.00 time per week. Drug: Marijuana.    Family History  Family History   Problem Relation Name Age of Onset    Other (PCI) Mother  80 - 89        Stent    Heart disease Mother      Hypertension Mother      Stomach cancer Father          Allergies  Other, Tetracyclines, Oxycodone, Oxycodone-acetaminophen, Penicillins,  Pseudoephedrine, Acetaminophen-codeine, Codeine, Psyllium, and Sulfa (sulfonamide antibiotics)    Immunizations  Immunization History   Administered Date(s) Administered    Flu vaccine, quadrivalent, high-dose, preservative free, age 65y+ (FLUZONE) 11/14/2023    Flu vaccine, quadrivalent, recombinant, preservative free, adult (FLUBLOK) 11/04/2020    Influenza, High Dose Seasonal, Preservative Free 01/27/2017, 09/26/2017, 11/08/2018, 09/12/2019, 10/11/2021, 10/25/2022    Influenza, Unspecified 10/25/2022    Influenza, seasonal, injectable 12/04/2012, 09/30/2013, 12/15/2015    Influenza, seasonal, injectable, preservative free 11/13/2018    Pfizer COVID-19 vaccine, bivalent, age 12 years and older (30 mcg/0.3 mL) 09/20/2022    Pfizer Gray Cap SARS-CoV-2 08/18/2022    Pfizer Purple Cap SARS-CoV-2 03/08/2021, 04/05/2021, 11/15/2021    Pneumococcal conjugate vaccine, 13-valent (PREVNAR 13) 05/29/2015, 03/12/2019    Pneumococcal polysaccharide vaccine, 23-valent, age 2 years and older (PNEUMOVAX 23) 02/25/2013, 10/09/2018    Tdap vaccine, age 7 year and older (BOOSTRIX, ADACEL) 05/10/2011    Zoster vaccine, recombinant, adult (SHINGRIX) 09/12/2019, 09/08/2020    Zoster, live 08/27/2012        ROS  Negative, except as discussed in HPI     Vitals  /78   Pulse 56   Temp 36.2 °C (97.1 °F)   Wt 58.5 kg (129 lb)   SpO2 98%   BMI 22.85 kg/m²      Physical Exam  Vitals and nursing note reviewed.   Constitutional:       General: She is not in acute distress.     Appearance: Normal appearance.   Cardiovascular:      Rate and Rhythm: Normal rate and regular rhythm.      Heart sounds: Normal heart sounds.   Pulmonary:      Effort: No respiratory distress.      Breath sounds: Normal breath sounds.   Neurological:      General: No focal deficit present.      Mental Status: She is alert. Mental status is at baseline.         Relevant Results  Lab Results   Component Value Date    WBC 6.8 06/06/2024    WBC 10.0 06/05/2024     HGB 9.5 (L) 06/06/2024    HGB 9.8 (L) 06/05/2024    HCT 28.2 (L) 06/06/2024    HCT 30.1 (L) 06/05/2024    MCV 81 06/06/2024    MCV 84 06/05/2024     06/06/2024     06/05/2024     Lab Results   Component Value Date     06/06/2024     06/05/2024    K 3.5 06/06/2024    K 3.7 06/05/2024     (H) 06/06/2024     06/05/2024    CO2 24 06/06/2024    CO2 20 (L) 06/05/2024    BUN 9 06/06/2024    BUN 7 (L) 06/05/2024    CREATININE 1.10 06/06/2024    CREATININE 0.90 06/05/2024    CALCIUM 9.1 06/06/2024    CALCIUM 8.7 06/05/2024    PROT 7.1 06/04/2024    PROT 6.1 (L) 05/06/2024    BILITOT 0.9 06/04/2024    BILITOT 0.5 05/06/2024    ALKPHOS 79 06/04/2024    ALKPHOS 69 05/06/2024    ALT <5 (L) 06/04/2024    ALT 6 (L) 05/06/2024    AST 10 06/04/2024    AST 10 05/06/2024    GLUCOSE 86 06/06/2024    GLUCOSE 106 (H) 06/05/2024     Lab Results   Component Value Date    HGBA1C 5.4 06/04/2024     Lab Results   Component Value Date    TSH 1.31 06/04/2024    FREET4 1.40 06/04/2024      Lab Results   Component Value Date    CHOL 172 05/06/2024    TRIG 102 05/06/2024    HDL 61.3 05/06/2024    LDLDIRECT 92 02/06/2024           Assessment/Plan   Indu was seen today for hypertension and potassium low.  Diagnoses and all orders for this visit:  Chronic pain syndrome (Primary)  Hypokalemia  -     potassium chloride CR (Klor-Con) 10 mEq ER tablet; Take 1 tablet (10 mEq) by mouth once daily. Do not crush, chew, or split.  -     Basic metabolic panel; Future  Postoperative hypothyroidism  -     levothyroxine (Synthroid, Levoxyl) 50 mcg tablet; Take 1 tablet (50 mcg) by mouth once daily.  Benign essential hypertension  -     lisinopril 5 mg tablet; Take 1 tablet (5 mg) by mouth once daily.  -     amLODIPine (Norvasc) 5 mg tablet; Take 1 tablet (5 mg) by mouth once daily.  Chronic bilateral low back pain with bilateral sciatica  Comments:  Her pain is impairing her ADLs.  Will resume gabapentin at a lower dose, 100  mg instead of 300mg  Orders:  -     gabapentin (Neurontin) 100 mg capsule; Take 1 capsule (100 mg) by mouth once daily in the morning.  Hypercholesterolemia  -     rosuvastatin (Crestor) 20 mg tablet; Take 1 tablet (20 mg) by mouth once daily.         Counseling:   Medication education:   -Education:  The patient is counseled regarding potential side-effects of any and all new medications  -Understanding:  Patient expressed understanding of information discussed today  -Adherence:  No barriers to adherence identified    Final treatment plan is a result of shared decision making with patient.         Otto Momin MD

## 2024-06-21 DIAGNOSIS — K21.9 GASTROESOPHAGEAL REFLUX DISEASE, UNSPECIFIED WHETHER ESOPHAGITIS PRESENT: ICD-10-CM

## 2024-06-24 NOTE — TELEPHONE ENCOUNTER
Please put this patient on the schedule today for an injection   Previously Declined (within the last year)

## 2024-06-25 RX ORDER — PANTOPRAZOLE SODIUM 40 MG/1
40 TABLET, DELAYED RELEASE ORAL
Qty: 90 TABLET | Refills: 3 | Status: SHIPPED | OUTPATIENT
Start: 2024-06-25 | End: 2025-06-20

## 2024-06-28 DIAGNOSIS — K21.9 GASTROESOPHAGEAL REFLUX DISEASE, UNSPECIFIED WHETHER ESOPHAGITIS PRESENT: ICD-10-CM

## 2024-07-01 NOTE — TELEPHONE ENCOUNTER
Rx request received  Pharmacy populated  Last appmt 6/19/24 for New patient ov.    Has an upcoming appmt 9/2024 with another pcp office.

## 2024-07-02 RX ORDER — PANTOPRAZOLE SODIUM 40 MG/1
40 TABLET, DELAYED RELEASE ORAL
Qty: 90 TABLET | Refills: 3 | OUTPATIENT
Start: 2024-07-02 | End: 2025-06-27

## 2024-07-18 ENCOUNTER — LAB (OUTPATIENT)
Dept: LAB | Facility: LAB | Age: 74
End: 2024-07-18
Payer: COMMERCIAL

## 2024-07-18 DIAGNOSIS — E87.6 HYPOKALEMIA: ICD-10-CM

## 2024-07-18 LAB
ANION GAP SERPL CALC-SCNC: 14 MMOL/L (ref 10–20)
BUN SERPL-MCNC: 14 MG/DL (ref 6–23)
CALCIUM SERPL-MCNC: 9.3 MG/DL (ref 8.6–10.6)
CHLORIDE SERPL-SCNC: 111 MMOL/L (ref 98–107)
CO2 SERPL-SCNC: 25 MMOL/L (ref 21–32)
CREAT SERPL-MCNC: 1.03 MG/DL (ref 0.5–1.05)
EGFRCR SERPLBLD CKD-EPI 2021: 58 ML/MIN/1.73M*2
GLUCOSE SERPL-MCNC: 74 MG/DL (ref 74–99)
POTASSIUM SERPL-SCNC: 4.9 MMOL/L (ref 3.5–5.3)
SODIUM SERPL-SCNC: 145 MMOL/L (ref 136–145)

## 2024-07-18 PROCEDURE — 80048 BASIC METABOLIC PNL TOTAL CA: CPT

## 2024-07-18 PROCEDURE — 36415 COLL VENOUS BLD VENIPUNCTURE: CPT

## 2024-07-19 RX ORDER — POTASSIUM CHLORIDE 750 MG/1
10 TABLET, FILM COATED, EXTENDED RELEASE ORAL DAILY
Qty: 90 TABLET | Refills: 3 | Status: SHIPPED | OUTPATIENT
Start: 2024-07-19 | End: 2025-07-19

## 2024-07-19 NOTE — TELEPHONE ENCOUNTER
Potassium level was normal  and kidney function stable with yesterday's lab check. Is that still on the potassium supplement?

## 2024-07-25 NOTE — TELEPHONE ENCOUNTER
ROBERT [phone] [facility] CALLING FOR A COURTESY Xll TO CONFIRM THAT THE PROVIDER IS AWARE THAT THE PATIENT IS ON [employer] [facility] CASE [employer]NAG[facility]

## 2024-08-04 DIAGNOSIS — I10 PRIMARY HYPERTENSION: Primary | ICD-10-CM

## 2024-08-05 RX ORDER — METOPROLOL SUCCINATE 25 MG/1
25 TABLET, EXTENDED RELEASE ORAL DAILY
Qty: 90 TABLET | Refills: 3 | Status: SHIPPED | OUTPATIENT
Start: 2024-08-05 | End: 2025-07-31

## 2024-08-08 ENCOUNTER — TELEPHONE (OUTPATIENT)
Dept: HOME HEALTH SERVICES | Facility: HOME HEALTH | Age: 74
End: 2024-08-08
Payer: COMMERCIAL

## 2024-08-08 ENCOUNTER — DOCUMENTATION (OUTPATIENT)
Dept: HOME HEALTH SERVICES | Facility: HOME HEALTH | Age: 74
End: 2024-08-08
Payer: COMMERCIAL

## 2024-08-08 ENCOUNTER — OFFICE VISIT (OUTPATIENT)
Dept: PRIMARY CARE | Facility: CLINIC | Age: 74
End: 2024-08-08
Payer: COMMERCIAL

## 2024-08-08 VITALS
HEART RATE: 111 BPM | DIASTOLIC BLOOD PRESSURE: 100 MMHG | SYSTOLIC BLOOD PRESSURE: 140 MMHG | OXYGEN SATURATION: 97 % | WEIGHT: 118.8 LBS | BODY MASS INDEX: 21.04 KG/M2 | TEMPERATURE: 96.4 F

## 2024-08-08 DIAGNOSIS — A08.4 VIRAL GASTROENTERITIS: Primary | ICD-10-CM

## 2024-08-08 DIAGNOSIS — M54.42 CHRONIC BILATERAL LOW BACK PAIN WITH BILATERAL SCIATICA: ICD-10-CM

## 2024-08-08 DIAGNOSIS — M54.41 CHRONIC BILATERAL LOW BACK PAIN WITH BILATERAL SCIATICA: ICD-10-CM

## 2024-08-08 DIAGNOSIS — K21.9 GASTROESOPHAGEAL REFLUX DISEASE, UNSPECIFIED WHETHER ESOPHAGITIS PRESENT: ICD-10-CM

## 2024-08-08 DIAGNOSIS — G89.29 CHRONIC BILATERAL LOW BACK PAIN WITH BILATERAL SCIATICA: ICD-10-CM

## 2024-08-08 PROCEDURE — 99214 OFFICE O/P EST MOD 30 MIN: CPT | Performed by: FAMILY MEDICINE

## 2024-08-08 RX ORDER — ONDANSETRON 4 MG/1
4 TABLET, ORALLY DISINTEGRATING ORAL EVERY 8 HOURS PRN
Qty: 40 TABLET | Refills: 1 | Status: SHIPPED | OUTPATIENT
Start: 2024-08-08 | End: 2025-08-08

## 2024-08-08 RX ORDER — PANTOPRAZOLE SODIUM 40 MG/1
40 TABLET, DELAYED RELEASE ORAL
Qty: 90 TABLET | Refills: 3 | Status: SHIPPED | OUTPATIENT
Start: 2024-08-08 | End: 2025-08-03

## 2024-08-08 ASSESSMENT — LIFESTYLE VARIABLES
SKIP TO QUESTIONS 9-10: 1
HOW MANY STANDARD DRINKS CONTAINING ALCOHOL DO YOU HAVE ON A TYPICAL DAY: PATIENT DOES NOT DRINK
HOW OFTEN DO YOU HAVE SIX OR MORE DRINKS ON ONE OCCASION: NEVER
HOW OFTEN DO YOU HAVE A DRINK CONTAINING ALCOHOL: NEVER
AUDIT-C TOTAL SCORE: 0

## 2024-08-08 ASSESSMENT — PATIENT HEALTH QUESTIONNAIRE - PHQ9
SUM OF ALL RESPONSES TO PHQ9 QUESTIONS 1 AND 2: 0
SUM OF ALL RESPONSES TO PHQ9 QUESTIONS 1 AND 2: 0
2. FEELING DOWN, DEPRESSED OR HOPELESS: NOT AT ALL
2. FEELING DOWN, DEPRESSED OR HOPELESS: NOT AT ALL
1. LITTLE INTEREST OR PLEASURE IN DOING THINGS: NOT AT ALL
1. LITTLE INTEREST OR PLEASURE IN DOING THINGS: NOT AT ALL

## 2024-08-08 ASSESSMENT — ENCOUNTER SYMPTOMS
OCCASIONAL FEELINGS OF UNSTEADINESS: 1
DEPRESSION: 0
LOSS OF SENSATION IN FEET: 0

## 2024-08-08 ASSESSMENT — PAIN SCALES - GENERAL: PAINLEVEL: 10-WORST PAIN EVER

## 2024-08-08 NOTE — PROGRESS NOTES
History Of Present Illness  Indu Red is a 73 y.o. female presenting for Pain (SCIATIC PAIN) and Sick Visit (STATES MONDAY HAD SUBWAY SANDWICH AND SINCE HAS HAD NAUSEA, VOMITING, DIARRHEA, CHILLS, STOMACH PAIN. DAUGHTER THINKS SHE NEEDS TO GO TO ED. CAN'T HOLD DOWN FLUIDS. MOM HAS REFUSED. HAS BEEN IN BED SINCE MONDAY. DAUGHTER STATES SHE IS UNABLE TO DO ANYTHING FOR HERSELF CURRENTLY. )  .    HPI     Accompanied by daughter, Rashida, who also gives HPI.     Patient initially made appointment for back pain. Has chronic sciatica bilaterally. Has done physical therapy, pain management with injections that provide short term relief, and has been taking gabapentin 100mg in AM and 300mg at bedtime. 100mg gabapentin in the AM takes edge off pain but makes her sleepy; tylneol and tizanidine helps minimally but also sedating. Pain worsening, difficult for her to bend over or get up. Using a cane to walk. Lives with mother who is 90. Not driving, relies on son to take her to physical therapy for shoulder.    For past 3 days since eating a [employer] sandwich, patient has had nausea and vomiting. Nonbloody. One day ago started with diarrhea that is nonbloody. Mother in home is not sick; and daughter in another household is not sick. Has chills. Limited hydration. Daughter wanted to patient patient to the ER but she refused. Patient has mainly been in bed, but was able to get up with a cane and daughter's assistance for today's visit. She was dizzy when she got up.   No chest pain, dyspnea.     Past Medical History  Patient Active Problem List    Diagnosis Date Noted    Moderate right ankle sprain 06/18/2024    Achilles tendon sprain, right, initial encounter 06/18/2024    Syncope and collapse 05/19/2024    Unspecified osteoarthritis, unspecified site 05/06/2024    Healthcare maintenance 02/06/2024    Medicare annual wellness visit, subsequent 02/06/2024    Acute cystitis with hematuria 02/06/2024    Anemia 02/05/2024     History of thyroid disorder 02/05/2024    Tubular adenoma of colon 02/05/2024    Urinary retention 01/08/2024    ELLIE (acute kidney injury) (CMS-HCC) 01/07/2024    Epigastric pain 01/05/2024    Hypertension 01/05/2024    Elevated troponin 01/04/2024    Nausea and vomiting 01/04/2024    Other sprain of right shoulder joint, sequela 12/19/2023    Partial nontraumatic tear of right rotator cuff 12/19/2023    Right shoulder pain 10/12/2023    Arteriosclerotic heart disease 09/21/2023    Encounter for screening mammogram for malignant neoplasm of breast 08/28/2023    Screening for malignant neoplasm of colon 08/28/2023    Nicotine dependence, cigarettes, uncomplicated 08/28/2023    Abdominal pain 08/25/2023    Abscess 08/25/2023    Adhesive capsulitis of right shoulder 08/25/2023    Age-related nuclear cataract of both eyes 08/25/2023    Age-related osteoporosis without current pathological fracture 08/25/2023    Atrophy of vagina 08/25/2023    Bilateral presbyopia 08/25/2023    Bradycardia 08/25/2023    Bursitis of left shoulder 08/25/2023    Cervical radiculitis 08/25/2023    [place] 08/25/2023    Chronic pain 08/25/2023    Chronic pain of multiple joints 08/25/2023    Allergic rhinitis 08/25/2023    Chronic kidney disease, stage 3a (Multi) 08/25/2023    Constipation due to opioid therapy 08/25/2023    Diverticulosis 08/25/2023    DJD (degenerative joint disease), cervical 08/25/2023    Dry eye syndrome of both lacrimal glands 08/25/2023    Abnormal electrocardiography 08/25/2023    GERD (gastroesophageal reflux disease) 08/25/2023    History of colon polyps 08/25/2023    Hyperchloremia 08/25/2023    Hypercholesterolemia 08/25/2023    Hyperglycemia 08/25/2023    Hypernatremia 08/25/2023    Hyperopia of both eyes 08/25/2023    Incomplete bladder emptying 08/25/2023    Insect bite of left foot 08/25/2023    Itching 08/25/2023    Knee pain 08/25/2023    Leukocytosis 08/25/2023    Low back pain 08/25/2023    Marijuana  use 08/25/2023    Meibomian gland dysfunction (MGD) of left eye 08/25/2023    Multiple sclerosis (Multi) 08/25/2023    Muscle cramps 08/25/2023    Nasal sinus congestion 08/25/2023    Needs smoking cessation education 08/25/2023    Nicotine dependence 08/25/2023    Nuclear sclerosis 08/25/2023    OAB (overactive bladder) 08/25/2023    Osteoarthritis 08/25/2023    Osteopenia 08/25/2023    Osteoporosis 08/25/2023    Pain, pelvic, female 08/25/2023    Pigmented skin lesion of uncertain nature 08/25/2023    Postoperative hypothyroidism 08/25/2023    Postoperative pain 08/25/2023    PVD (posterior vitreous detachment), both eyes 08/25/2023    Radiculopathy 08/25/2023    Rash 08/25/2023    Referred otalgia of right ear 08/25/2023    Schatzki's ring 08/25/2023    Sprain of MCL (medial collateral ligament) of knee 08/25/2023    Synovial plica syndrome of left knee 08/25/2023    TIA (transient ischemic attack) 08/25/2023    TMJ pain dysfunction syndrome 08/25/2023    Tobacco use disorder 08/25/2023    Vaginal discharge 08/25/2023    Vitamin D deficiency 08/25/2023    Vitreous degeneration 08/25/2023    Wheezing without diagnosis of asthma 08/25/2023    Antalgic gait 06/16/2023    Contracture of joint of left foot 06/16/2023    Hallux varus (acquired), left foot 06/16/2023    Hammertoe of second toe of left foot 06/16/2023    Metatarsalgia of left foot 06/16/2023    Other disturbances of skin sensation 06/16/2023    Pain in right shoulder 04/10/2023    Melanocytic nevi of trunk 01/10/2018    Other seborrheic keratosis 01/10/2018    S/P right rotator cuff repair 06/21/2016    Dental abscess 07/22/2014        Medications  Current Outpatient Medications   Medication Sig Dispense Refill    albuterol 90 mcg/actuation inhaler Inhale 2 puffs every 6 hours if needed for wheezing or shortness of breath.      amLODIPine (Norvasc) 5 mg tablet Take 1 tablet (5 mg) by mouth once daily. 90 tablet 1    aspirin 81 mg EC tablet 1 tablet (81  mg) once every 24 hours. AM      cholecalciferol (Vitamin D3) 25 MCG (1000 UT) tablet Take 1 tablet (25 mcg) by mouth once daily. AM      diclofenac sodium (Voltaren) 1 % gel Apply 4.5 inches (4 g) topically 2 times a day as needed (pain). 200 g 5    gabapentin (Neurontin) 100 mg capsule Take 1 capsule (100 mg) by mouth once daily in the morning. 90 capsule 1    ipratropium (Atrovent) 42 mcg (0.06 %) nasal spray 2 sprays 4 times a day as needed for rhinitis.      levothyroxine (Synthroid, Levoxyl) 50 mcg tablet Take 1 tablet (50 mcg) by mouth once daily. 90 tablet 1    lisinopril 5 mg tablet Take 1 tablet (5 mg) by mouth once daily. 90 tablet 1    metoprolol succinate XL (Toprol-XL) 25 mg 24 hr tablet TAKE 1 TABLET BY MOUTH EVERY DAY FOR 90 DAYS 90 tablet 3    potassium chloride CR 10 mEq ER tablet Take 1 tablet (10 mEq) by mouth once daily. DO NOT CRUSH CHEW OR SPLIT 90 tablet 3    rosuvastatin (Crestor) 20 mg tablet Take 1 tablet (20 mg) by mouth once daily. 100 tablet 3    teriparatide (Forteo) injection Inject 0.08 mL (20 mcg) under the skin once daily. . REFRIGERATE AND DISCARD PEN 28 DAYS AFTER INITIAL USE.  MID AFTERNOON FOR OSTEOPOROSIS      tiZANidine (Zanaflex) 4 mg tablet TAKE 1 TABLET BY MOUTH THREE TIMES A DAY AS NEEDED 60 tablet 3    traMADol (Ultram) 50 mg tablet Take by mouth.      venlafaxine (Effexor) 25 mg tablet TAKE 1 TABLET BY MOUTH TWICE A  tablet 1    nicotine (Nicoderm CQ) 14 mg/24 hr patch Place 1 patch over 24 hours on the skin once daily.      ondansetron ODT (Zofran-ODT) 4 mg disintegrating tablet Take 1 tablet (4 mg) by mouth every 8 hours if needed for nausea or vomiting. 40 tablet 1    pantoprazole (ProtoNix) 40 mg EC tablet Take 1 tablet (40 mg) by mouth once daily in the morning. Take before meals. 90 tablet 3    teriflunomide (AUBAGIO ORAL) Take 14 mg by mouth once daily. EVERY AFTERNOON FOR TREATMENT OF MS       No current facility-administered medications for this visit.         Surgical History  She has a past surgical history that includes Appendectomy (08/03/2016); Other surgical history (08/03/2016); Colonoscopy (08/03/2016); Oophorectomy (08/03/2016); Total vaginal hysterectomy (03/09/2017); MR angio neck wo IV contrast (07/21/2023); MR angio head wo IV contrast (07/21/2023); Thyroidectomy, partial; Salpingectomy; Temporomandibular joint surgery; Esophagogastroduodenoscopy; Foot surgery (Left); and Shoulder surgery (Right, 02/13/2024).     Social History  She reports that she has been smoking cigarettes. She has a 12.5 pack-year smoking history. She has been exposed to tobacco smoke. She has never used smokeless tobacco. She reports that she does not currently use alcohol. She reports current drug use. Frequency: 1.00 time per week. Drug: Marijuana.    Family History  Family History   Problem Relation Name Age of Onset    Other (PCI) Mother  80 - 89        Stent    Heart disease Mother      Hypertension Mother      Stomach cancer Father          Allergies  Other, Tetracyclines, Oxycodone, Oxycodone-acetaminophen, Penicillins, Pseudoephedrine, Acetaminophen-codeine, Codeine, Psyllium, and Sulfa (sulfonamide antibiotics)    Immunizations  Immunization History   Administered Date(s) Administered    Flu vaccine, quadrivalent, high-dose, preservative free, age 65y+ (FLUZONE) 11/14/2023    Flu vaccine, quadrivalent, recombinant, preservative free, adult (FLUBLOK) 11/04/2020    Flu vaccine, trivalent, preservative free, HIGH-DOSE, age 65y+ (Fluzone) 01/27/2017, 09/26/2017, 11/08/2018, 09/12/2019, 10/11/2021, 10/25/2022    Flu vaccine, trivalent, preservative free, age 6 months and greater (Fluarix/Fluzone/Flulaval) 11/13/2018    Influenza, Unspecified 10/25/2022    Influenza, seasonal, injectable 12/04/2012, 09/30/2013, 12/15/2015    Pfizer COVID-19 vaccine, bivalent, age 12 years and older (30 mcg/0.3 mL) 09/20/2022    Pfizer Gray Cap SARS-CoV-2 08/18/2022    Pfizer Purple Cap  SARS-CoV-2 03/08/2021, 04/05/2021, 11/15/2021    Pneumococcal conjugate vaccine, 13-valent (PREVNAR 13) 05/29/2015, 03/12/2019    Pneumococcal polysaccharide vaccine, 23-valent, age 2 years and older (PNEUMOVAX 23) 02/25/2013, 10/09/2018    Tdap vaccine, age 7 year and older (BOOSTRIX, ADACEL) 05/10/2011    Zoster vaccine, recombinant, adult (SHINGRIX) 09/12/2019, 09/08/2020    Zoster, live 08/27/2012        ROS  Negative, except as discussed in HPI     Vitals  BP (!) 140/100   Pulse (!) 111   Temp 35.8 °C (96.4 °F)   Wt 53.9 kg (118 lb 12.8 oz)   SpO2 97%   BMI 21.04 kg/m²      Physical Exam  Vitals and nursing note reviewed. Exam conducted with a chaperone present (daughter Rashida).   Constitutional:       General: She is not in acute distress.     Appearance: She is not toxic-appearing.      Comments: Appears in mild discomfort   HENT:      Mouth/Throat:      Comments: Mildly dry  Cardiovascular:      Rate and Rhythm: Regular rhythm. Tachycardia present.   Pulmonary:      Effort: Pulmonary effort is normal. No respiratory distress.   Abdominal:      Palpations: Abdomen is soft.   Skin:     General: Skin is warm and dry.   Neurological:      Mental Status: She is alert and oriented to person, place, and time. Mental status is at baseline.   Psychiatric:         Thought Content: Thought content normal.         Judgment: Judgment normal.         Relevant Results  Lab Results   Component Value Date    WBC 6.8 06/06/2024    WBC 10.0 06/05/2024    HGB 9.5 (L) 06/06/2024    HGB 9.8 (L) 06/05/2024    HCT 28.2 (L) 06/06/2024    HCT 30.1 (L) 06/05/2024    MCV 81 06/06/2024    MCV 84 06/05/2024     06/06/2024     06/05/2024     Lab Results   Component Value Date     07/18/2024     06/06/2024    K 4.9 07/18/2024    K 3.5 06/06/2024     (H) 07/18/2024     (H) 06/06/2024    CO2 25 07/18/2024    CO2 24 06/06/2024    BUN 14 07/18/2024    BUN 9 06/06/2024    CREATININE 1.03 07/18/2024     CREATININE 1.10 06/06/2024    CALCIUM 9.3 07/18/2024    CALCIUM 9.1 06/06/2024    PROT 7.1 06/04/2024    PROT 6.1 (L) 05/06/2024    BILITOT 0.9 06/04/2024    BILITOT 0.5 05/06/2024    ALKPHOS 79 06/04/2024    ALKPHOS 69 05/06/2024    ALT <5 (L) 06/04/2024    ALT 6 (L) 05/06/2024    AST 10 06/04/2024    AST 10 05/06/2024    GLUCOSE 74 07/18/2024    GLUCOSE 86 06/06/2024     Lab Results   Component Value Date    HGBA1C 5.4 06/04/2024     Lab Results   Component Value Date    TSH 1.31 06/04/2024    FREET4 1.40 06/04/2024      Lab Results   Component Value Date    CHOL 172 05/06/2024    TRIG 102 05/06/2024    HDL 61.3 05/06/2024    LDLDIRECT 92 02/06/2024           Assessment/Plan   Indu was seen today for pain and sick visit.  Diagnoses and all orders for this visit:  Viral gastroenteritis (Primary)         -    As patient refusing to go to the ER at this time, and has a caretaker discussed trial of supportive care with zofrant, imodium and increasing hydration. If symptoms worsen, advise daughter to take pt to the ER, which patient and daughter were agreeable to plan  -     ondansetron ODT (Zofran-ODT) 4 mg disintegrating tablet; Take 1 tablet (4 mg) by mouth every 8 hours if needed for nausea or vomiting.  Gastroesophageal reflux disease, unspecified whether esophagitis present  -     pantoprazole (ProtoNix) 40 mg EC tablet; Take 1 tablet (40 mg) by mouth once daily in the morning. Take before meals.  Chronic bilateral low back pain with bilateral sciatica        -      Patient is homebound by age, functional limitations including being a nondriver and using cane, relying on other for transport to medical facilities. Also, suspect that this recent viral gastroenteritis will further weaken patient so she will be further benefited to have home physical therapy.  -     Referral to Home Care; Future  -     XR lumbar spine 2-3 views; Future         Counseling:   Medication education:   -Education:  The patient is  counseled regarding potential side-effects of any and all new medications  -Understanding:  Patient expressed understanding of information discussed today  -Adherence:  No barriers to adherence identified    Final treatment plan is a result of shared decision making with patient.         Otto Momin MD

## 2024-08-08 NOTE — TELEPHONE ENCOUNTER
Thank you for your referral to  Home Care. At this time, we are unable to accommodate your patient's needs in a safe and timely manner. In order to help your patient receive quality home care, we have included certified agencies that service this area:         [facility] - P: [phone]; F: [phone]            [facility] - P: [phone]; F:[phone]         You may contact one of these agencies, or an alternate of your choice, to see if they are able to accept your patient.    Thank you,  [facility] Intake    An additional option to consider is [facility] with Dr. Zhang's team

## 2024-08-21 DIAGNOSIS — M54.42 CHRONIC BILATERAL LOW BACK PAIN WITH BILATERAL SCIATICA: ICD-10-CM

## 2024-08-21 DIAGNOSIS — M54.41 CHRONIC BILATERAL LOW BACK PAIN WITH BILATERAL SCIATICA: ICD-10-CM

## 2024-08-21 DIAGNOSIS — G89.29 CHRONIC BILATERAL LOW BACK PAIN WITH BILATERAL SCIATICA: ICD-10-CM

## 2024-08-21 RX ORDER — GABAPENTIN 100 MG/1
100 CAPSULE ORAL EVERY MORNING
Qty: 90 CAPSULE | Refills: 1 | OUTPATIENT
Start: 2024-08-21 | End: 2025-08-21

## 2024-08-22 ENCOUNTER — OFFICE VISIT (OUTPATIENT)
Dept: ORTHOPEDIC SURGERY | Facility: CLINIC | Age: 74
End: 2024-08-22
Payer: COMMERCIAL

## 2024-08-22 ENCOUNTER — TELEPHONE (OUTPATIENT)
Dept: PRIMARY CARE | Facility: CLINIC | Age: 74
End: 2024-08-22

## 2024-08-22 VITALS — BODY MASS INDEX: 22.32 KG/M2 | HEIGHT: 63 IN | WEIGHT: 126 LBS

## 2024-08-22 DIAGNOSIS — G89.29 CHRONIC BILATERAL LOW BACK PAIN WITH BILATERAL SCIATICA: Primary | ICD-10-CM

## 2024-08-22 DIAGNOSIS — S93.401A MODERATE RIGHT ANKLE SPRAIN, INITIAL ENCOUNTER: ICD-10-CM

## 2024-08-22 DIAGNOSIS — Z98.890 S/P RIGHT ROTATOR CUFF REPAIR: ICD-10-CM

## 2024-08-22 DIAGNOSIS — M25.511 RIGHT SHOULDER PAIN, UNSPECIFIED CHRONICITY: Primary | ICD-10-CM

## 2024-08-22 DIAGNOSIS — S86.011A ACHILLES TENDON SPRAIN, RIGHT, INITIAL ENCOUNTER: ICD-10-CM

## 2024-08-22 DIAGNOSIS — M54.41 CHRONIC BILATERAL LOW BACK PAIN WITH BILATERAL SCIATICA: Primary | ICD-10-CM

## 2024-08-22 DIAGNOSIS — M54.42 CHRONIC BILATERAL LOW BACK PAIN WITH BILATERAL SCIATICA: Primary | ICD-10-CM

## 2024-08-22 PROCEDURE — 1160F RVW MEDS BY RX/DR IN RCRD: CPT | Performed by: ORTHOPAEDIC SURGERY

## 2024-08-22 PROCEDURE — 4004F PT TOBACCO SCREEN RCVD TLK: CPT | Performed by: ORTHOPAEDIC SURGERY

## 2024-08-22 PROCEDURE — 1123F ACP DISCUSS/DSCN MKR DOCD: CPT | Performed by: ORTHOPAEDIC SURGERY

## 2024-08-22 PROCEDURE — 99213 OFFICE O/P EST LOW 20 MIN: CPT | Performed by: ORTHOPAEDIC SURGERY

## 2024-08-22 PROCEDURE — 1125F AMNT PAIN NOTED PAIN PRSNT: CPT | Performed by: ORTHOPAEDIC SURGERY

## 2024-08-22 PROCEDURE — 3008F BODY MASS INDEX DOCD: CPT | Performed by: ORTHOPAEDIC SURGERY

## 2024-08-22 PROCEDURE — 1159F MED LIST DOCD IN RCRD: CPT | Performed by: ORTHOPAEDIC SURGERY

## 2024-08-22 ASSESSMENT — PAIN SCALES - GENERAL: PAINLEVEL: 7

## 2024-08-22 NOTE — TELEPHONE ENCOUNTER
Formerly [facility], later [facility] [phone] CALLING REGARDING HOME THERAPY FOR BACK AND PT WOULD LIKE OUTPATIENT  THERAPY WOULD LIKE ORDER [phone]

## 2024-08-22 NOTE — PROGRESS NOTES
Subjective      Past Surgical History:   Procedure Laterality Date    APPENDECTOMY  08/03/2016    COLONOSCOPY  08/03/2016    With polypectomy    ESOPHAGOGASTRODUODENOSCOPY      FOOT SURGERY Left     MR HEAD ANGIO WO IV CONTRAST  07/21/2023    MR HEAD ANGIO WO IV CONTRAST 7/21/2023 [facility] MRI    MR NECK ANGIO WO IV CONTRAST  07/21/2023    MR NECK ANGIO WO IV CONTRAST 7/21/2023 [facility] MRI    OOPHORECTOMY  08/03/2016    OTHER SURGICAL HISTORY  08/03/2016    Cervical Conization    SALPINGECTOMY      TEMPOROMANDIBULAR JOINT SURGERY      THYROIDECTOMY, PARTIAL      Substernal    TOTAL VAGINAL HYSTERECTOMY  03/09/2017      Patient History      This patient is s/p right rotator cuff repair done by myself on 2-. They present today and state that their right shoulder pain is now markedly improved and is only a 2/10 at most and is only worse with and aggravated by exercising which she needs to do to regain her full potential.  She is resuming their normal activities of daily living. She was working with outpatient physical therapy. She suffered a syncopal episode on 6-4-2024 and was admitted to the hospital. She was diagnosed with hypokalemia. She  injured her ankle at that time but now states that her right ankle pain is completely improved.  She is walking independently without pain. She denied chest pain, shortness of breath.     There were no vitals taken for this visit.     ROS  CARDIOLOGY:   Negative for chest pain, shortness of breath.   RESPIRATORY:   Negative for chest pain, shortness of breath.   MUSCULOSKELETAL:   See HPI for details.   NEUROLOGY:   Negative for tingling, numbness, weakness.      Physical exam: Right shoulder: incision is clean dry and well healing. The right upper extremity is in good position. Patient is able to complete ROM in small circles with no pain. She has painless ROM in active flexion from 0-110 and active abduction from 0-110 degrees. She is able to initiate active flexion and active  abduction.  compartments are soft. Neurovascular is intact. Right ankle:  Nontender.  No pain with range of motion or weightbearing.  The patient is able to walk independently without support and without pain.Nontender in the right calf. Neurovascular is intact. Huntington is negative and equal bilaterally.    Right foot x-rays from 6- shows   No acute fracture or dislocation. Scattered mild to moderate degenerative   changes at the IP joints. Mild-moderate first MTP joint degenerative   change. Scattered mild degenerative changes at the midfoot, most   pronounced at the talonavicular joint. Tiny plantar calcaneal spurs.   Small amount of linear calcification at the Achilles tendon insertion.     Indu was seen today for post-op.    Diagnoses and all orders for this visit:  Right shoulder pain, unspecified chronicity (Primary)  S/P right rotator cuff repair  Options are discussed with the patient in detail.  The patient is  instructured to continue withphysical therapy.  She is further instructed regarding activity modification and risk for further injury with falling or trauma, ice, provider directed at home gentle strengthening and ROM exercises, and the appropriate use of Tylenol as needed for pain with its potential adverse reactions and side effects. The patient understands. Return as needed. Please note that this report has been produced using speech recognition software.  It may contain errors related to grammar, punctuation or spelling.  Electronically signed, but not reviewed.

## 2024-08-23 ENCOUNTER — APPOINTMENT (OUTPATIENT)
Dept: ORTHOPEDIC SURGERY | Facility: CLINIC | Age: 74
End: 2024-08-23
Payer: COMMERCIAL

## 2024-08-26 ENCOUNTER — OFFICE VISIT (OUTPATIENT)
Dept: CARDIOLOGY | Facility: CLINIC | Age: 74
End: 2024-08-26
Payer: COMMERCIAL

## 2024-08-26 VITALS
OXYGEN SATURATION: 99 % | SYSTOLIC BLOOD PRESSURE: 118 MMHG | HEART RATE: 66 BPM | BODY MASS INDEX: 22.5 KG/M2 | DIASTOLIC BLOOD PRESSURE: 68 MMHG | WEIGHT: 127 LBS

## 2024-08-26 DIAGNOSIS — I10 BENIGN ESSENTIAL HYPERTENSION: Primary | ICD-10-CM

## 2024-08-26 PROCEDURE — 4004F PT TOBACCO SCREEN RCVD TLK: CPT | Performed by: INTERNAL MEDICINE

## 2024-08-26 PROCEDURE — 1123F ACP DISCUSS/DSCN MKR DOCD: CPT | Performed by: INTERNAL MEDICINE

## 2024-08-26 PROCEDURE — 99213 OFFICE O/P EST LOW 20 MIN: CPT | Performed by: INTERNAL MEDICINE

## 2024-08-26 PROCEDURE — 1125F AMNT PAIN NOTED PAIN PRSNT: CPT | Performed by: INTERNAL MEDICINE

## 2024-08-26 PROCEDURE — 3078F DIAST BP <80 MM HG: CPT | Performed by: INTERNAL MEDICINE

## 2024-08-26 PROCEDURE — 1159F MED LIST DOCD IN RCRD: CPT | Performed by: INTERNAL MEDICINE

## 2024-08-26 PROCEDURE — 3074F SYST BP LT 130 MM HG: CPT | Performed by: INTERNAL MEDICINE

## 2024-08-26 ASSESSMENT — PAIN SCALES - GENERAL: PAINLEVEL: 5

## 2024-08-26 ASSESSMENT — ENCOUNTER SYMPTOMS
DEPRESSION: 0
LOSS OF SENSATION IN FEET: 0
OCCASIONAL FEELINGS OF UNSTEADINESS: 1

## 2024-08-26 NOTE — PROGRESS NOTES
Subjective      Chief Complaint   Patient presents with    3 month follow up        Here to reassess hypertension management, angina free with no signs of heart failure.  No syncopal episodes since her previous visit in the absence of diuretic therapy.      .  Previous:.  Syncopal episode in May 2024 with a brief hospitalization at [facility] and no definitive cause of her episode.  She had a myocardial perfusion study that was nonischemic with preserved LV systolic function.  It was felt that she may have had dehydration in the setting of her previous diuretic and that diuretic was stopped         Review of Systems   All other systems reviewed and are negative.       Objective   Physical Exam  Constitutional:       Appearance: Normal appearance.   HENT:      Head: Normocephalic and atraumatic.   Eyes:      Pupils: Pupils are equal, round, and reactive to light.   Cardiovascular:      Rate and Rhythm: Normal rate and regular rhythm.      Pulses: Normal pulses.      Heart sounds: Normal heart sounds.   Pulmonary:      Effort: Pulmonary effort is normal.      Breath sounds: Normal breath sounds.   Abdominal:      General: Abdomen is flat. Bowel sounds are normal.      Palpations: Abdomen is soft.   Musculoskeletal:         General: Normal range of motion.      Cervical back: Normal range of motion.   Skin:     General: Skin is warm and dry.   Neurological:      General: No focal deficit present.   Psychiatric:         Mood and Affect: Mood normal.         Judgment: Judgment normal.          Lab Review:   Not applicable    Hypertension  Well-managed in the setting of discontinuing her diuretic therapy suggesting that she may have had orthostatic hypotension in the past from dehydration because the remote syncope

## 2024-09-09 ENCOUNTER — APPOINTMENT (OUTPATIENT)
Dept: PRIMARY CARE | Facility: CLINIC | Age: 74
End: 2024-09-09
Payer: COMMERCIAL

## 2024-09-18 ENCOUNTER — TELEPHONE (OUTPATIENT)
Dept: PRIMARY CARE | Facility: CLINIC | Age: 74
End: 2024-09-18
Payer: COMMERCIAL

## 2024-09-18 NOTE — TELEPHONE ENCOUNTER
PT REQUESTING REFILL LEVOTHYROXIN 50 MG, GABAPENTIN 100, GABAPENTIN 300 MG SENT TO [facility] ON [address]

## 2024-09-18 NOTE — TELEPHONE ENCOUNTER
PT CALLING TO CONFIRM THAT SHE RECEIVED THE MESSAGE REGARDING MEDICATION REFILL STATES HE PHONE DISCONNECTED.

## 2024-09-18 NOTE — TELEPHONE ENCOUNTER
SPOKE WITH PHARMACY, THEY WERE ABLE TO PROCESS REFILLS. PT CAN  TODAY. CALLED PT TO LET HER KNOW BUT GOT DISCONNECTED TWICE.

## 2024-09-25 ENCOUNTER — HOSPITAL ENCOUNTER (INPATIENT)
Facility: HOSPITAL | Age: 74
LOS: 2 days | Discharge: HOME | End: 2024-09-28
Attending: STUDENT IN AN ORGANIZED HEALTH CARE EDUCATION/TRAINING PROGRAM | Admitting: INTERNAL MEDICINE
Payer: COMMERCIAL

## 2024-09-25 ENCOUNTER — TELEPHONE (OUTPATIENT)
Dept: PRIMARY CARE | Facility: CLINIC | Age: 74
End: 2024-09-25
Payer: COMMERCIAL

## 2024-09-25 ENCOUNTER — CLINICAL SUPPORT (OUTPATIENT)
Dept: EMERGENCY MEDICINE | Facility: HOSPITAL | Age: 74
End: 2024-09-25
Payer: COMMERCIAL

## 2024-09-25 ENCOUNTER — APPOINTMENT (OUTPATIENT)
Dept: RADIOLOGY | Facility: HOSPITAL | Age: 74
End: 2024-09-25
Payer: COMMERCIAL

## 2024-09-25 DIAGNOSIS — K59.00 CONSTIPATION, UNSPECIFIED CONSTIPATION TYPE: ICD-10-CM

## 2024-09-25 DIAGNOSIS — K56.7 ILEUS (MULTI): Primary | ICD-10-CM

## 2024-09-25 DIAGNOSIS — T40.2X5A CONSTIPATION DUE TO OPIOID THERAPY: ICD-10-CM

## 2024-09-25 DIAGNOSIS — K59.03 CONSTIPATION DUE TO OPIOID THERAPY: ICD-10-CM

## 2024-09-25 LAB
ALBUMIN SERPL BCP-MCNC: 4.1 G/DL (ref 3.4–5)
ALP SERPL-CCNC: 76 U/L (ref 33–136)
ALT SERPL W P-5'-P-CCNC: 5 U/L (ref 7–45)
ANION GAP BLDV CALCULATED.4IONS-SCNC: 11 MMOL/L (ref 10–25)
ANION GAP SERPL CALC-SCNC: 23 MMOL/L (ref 10–20)
APPEARANCE UR: CLEAR
AST SERPL W P-5'-P-CCNC: 10 U/L (ref 9–39)
BASE EXCESS BLDV CALC-SCNC: -0.2 MMOL/L (ref -2–3)
BASOPHILS # BLD AUTO: 0.04 X10*3/UL (ref 0–0.1)
BASOPHILS NFR BLD AUTO: 0.3 %
BILIRUB SERPL-MCNC: 0.5 MG/DL (ref 0–1.2)
BILIRUB UR STRIP.AUTO-MCNC: NEGATIVE MG/DL
BODY TEMPERATURE: 37 DEGREES CELSIUS
BUN SERPL-MCNC: 13 MG/DL (ref 6–23)
CA-I BLDV-SCNC: 1.15 MMOL/L (ref 1.1–1.33)
CALCIUM SERPL-MCNC: 9.5 MG/DL (ref 8.6–10.6)
CHLORIDE BLDV-SCNC: 102 MMOL/L (ref 98–107)
CHLORIDE SERPL-SCNC: 100 MMOL/L (ref 98–107)
CO2 SERPL-SCNC: 15 MMOL/L (ref 21–32)
COLOR UR: YELLOW
CREAT SERPL-MCNC: 0.98 MG/DL (ref 0.5–1.05)
EGFRCR SERPLBLD CKD-EPI 2021: 61 ML/MIN/1.73M*2
EOSINOPHIL # BLD AUTO: 0.06 X10*3/UL (ref 0–0.4)
EOSINOPHIL NFR BLD AUTO: 0.5 %
ERYTHROCYTE [DISTWIDTH] IN BLOOD BY AUTOMATED COUNT: 14.6 % (ref 11.5–14.5)
GLUCOSE BLDV-MCNC: 71 MG/DL (ref 74–99)
GLUCOSE SERPL-MCNC: 72 MG/DL (ref 74–99)
GLUCOSE UR STRIP.AUTO-MCNC: NORMAL MG/DL
HCO3 BLDV-SCNC: 24.8 MMOL/L (ref 22–26)
HCT VFR BLD AUTO: 40.2 % (ref 36–46)
HCT VFR BLD EST: 32 % (ref 36–46)
HGB BLD-MCNC: 12.7 G/DL (ref 12–16)
HGB BLDV-MCNC: 10.8 G/DL (ref 12–16)
IMM GRANULOCYTES # BLD AUTO: 0.07 X10*3/UL (ref 0–0.5)
IMM GRANULOCYTES NFR BLD AUTO: 0.6 % (ref 0–0.9)
INHALED O2 CONCENTRATION: 21 %
KETONES UR STRIP.AUTO-MCNC: ABNORMAL MG/DL
LACTATE BLDV-SCNC: 0.8 MMOL/L (ref 0.4–2)
LEUKOCYTE ESTERASE UR QL STRIP.AUTO: NEGATIVE
LIPASE SERPL-CCNC: 153 U/L (ref 9–82)
LYMPHOCYTES # BLD AUTO: 2.32 X10*3/UL (ref 0.8–3)
LYMPHOCYTES NFR BLD AUTO: 19.8 %
MCH RBC QN AUTO: 27.1 PG (ref 26–34)
MCHC RBC AUTO-ENTMCNC: 31.6 G/DL (ref 32–36)
MCV RBC AUTO: 86 FL (ref 80–100)
MONOCYTES # BLD AUTO: 0.69 X10*3/UL (ref 0.05–0.8)
MONOCYTES NFR BLD AUTO: 5.9 %
MUCOUS THREADS #/AREA URNS AUTO: ABNORMAL /LPF
NEUTROPHILS # BLD AUTO: 8.52 X10*3/UL (ref 1.6–5.5)
NEUTROPHILS NFR BLD AUTO: 72.9 %
NITRITE UR QL STRIP.AUTO: NEGATIVE
NRBC BLD-RTO: 0 /100 WBCS (ref 0–0)
OXYHGB MFR BLDV: 71.5 % (ref 45–75)
PCO2 BLDV: 41 MM HG (ref 41–51)
PH BLDV: 7.39 PH (ref 7.33–7.43)
PH UR STRIP.AUTO: 6 [PH]
PLATELET # BLD AUTO: 412 X10*3/UL (ref 150–450)
PO2 BLDV: 45 MM HG (ref 35–45)
POTASSIUM BLDV-SCNC: 3.2 MMOL/L (ref 3.5–5.3)
POTASSIUM SERPL-SCNC: 3.6 MMOL/L (ref 3.5–5.3)
PROT SERPL-MCNC: 6.6 G/DL (ref 6.4–8.2)
PROT UR STRIP.AUTO-MCNC: ABNORMAL MG/DL
RBC # BLD AUTO: 4.69 X10*6/UL (ref 4–5.2)
RBC # UR STRIP.AUTO: ABNORMAL /UL
RBC #/AREA URNS AUTO: ABNORMAL /HPF
SAO2 % BLDV: 74 % (ref 45–75)
SODIUM BLDV-SCNC: 135 MMOL/L (ref 136–145)
SODIUM SERPL-SCNC: 134 MMOL/L (ref 136–145)
SP GR UR STRIP.AUTO: 1.01
SQUAMOUS #/AREA URNS AUTO: ABNORMAL /HPF
UROBILINOGEN UR STRIP.AUTO-MCNC: ABNORMAL MG/DL
WBC # BLD AUTO: 11.7 X10*3/UL (ref 4.4–11.3)
WBC #/AREA URNS AUTO: ABNORMAL /HPF

## 2024-09-25 PROCEDURE — 83690 ASSAY OF LIPASE: CPT

## 2024-09-25 PROCEDURE — 36415 COLL VENOUS BLD VENIPUNCTURE: CPT

## 2024-09-25 PROCEDURE — 81001 URINALYSIS AUTO W/SCOPE: CPT | Performed by: STUDENT IN AN ORGANIZED HEALTH CARE EDUCATION/TRAINING PROGRAM

## 2024-09-25 PROCEDURE — 74177 CT ABD & PELVIS W/CONTRAST: CPT

## 2024-09-25 PROCEDURE — 96375 TX/PRO/DX INJ NEW DRUG ADDON: CPT

## 2024-09-25 PROCEDURE — 93005 ELECTROCARDIOGRAM TRACING: CPT

## 2024-09-25 PROCEDURE — 2550000001 HC RX 255 CONTRASTS: Mod: SE | Performed by: STUDENT IN AN ORGANIZED HEALTH CARE EDUCATION/TRAINING PROGRAM

## 2024-09-25 PROCEDURE — 85025 COMPLETE CBC W/AUTO DIFF WBC: CPT

## 2024-09-25 PROCEDURE — 96374 THER/PROPH/DIAG INJ IV PUSH: CPT

## 2024-09-25 PROCEDURE — 2500000004 HC RX 250 GENERAL PHARMACY W/ HCPCS (ALT 636 FOR OP/ED): Mod: SE

## 2024-09-25 PROCEDURE — 99285 EMERGENCY DEPT VISIT HI MDM: CPT

## 2024-09-25 PROCEDURE — 74177 CT ABD & PELVIS W/CONTRAST: CPT | Performed by: RADIOLOGY

## 2024-09-25 PROCEDURE — 96361 HYDRATE IV INFUSION ADD-ON: CPT

## 2024-09-25 PROCEDURE — 84132 ASSAY OF SERUM POTASSIUM: CPT

## 2024-09-25 PROCEDURE — 80053 COMPREHEN METABOLIC PANEL: CPT

## 2024-09-25 RX ORDER — MORPHINE SULFATE 4 MG/ML
4 INJECTION INTRAVENOUS ONCE
Status: COMPLETED | OUTPATIENT
Start: 2024-09-25 | End: 2024-09-25

## 2024-09-25 RX ORDER — ONDANSETRON HYDROCHLORIDE 2 MG/ML
4 INJECTION, SOLUTION INTRAVENOUS ONCE
Status: COMPLETED | OUTPATIENT
Start: 2024-09-25 | End: 2024-09-25

## 2024-09-25 ASSESSMENT — COLUMBIA-SUICIDE SEVERITY RATING SCALE - C-SSRS
1. IN THE PAST MONTH, HAVE YOU WISHED YOU WERE DEAD OR WISHED YOU COULD GO TO SLEEP AND NOT WAKE UP?: NO
2. HAVE YOU ACTUALLY HAD ANY THOUGHTS OF KILLING YOURSELF?: NO
6. HAVE YOU EVER DONE ANYTHING, STARTED TO DO ANYTHING, OR PREPARED TO DO ANYTHING TO END YOUR LIFE?: NO

## 2024-09-25 ASSESSMENT — PAIN - FUNCTIONAL ASSESSMENT
PAIN_FUNCTIONAL_ASSESSMENT: 0-10
PAIN_FUNCTIONAL_ASSESSMENT: 0-10

## 2024-09-25 ASSESSMENT — PAIN SCALES - GENERAL
PAINLEVEL_OUTOF10: 9
PAINLEVEL_OUTOF10: 5 - MODERATE PAIN
PAINLEVEL_OUTOF10: 0 - NO PAIN

## 2024-09-25 ASSESSMENT — PAIN DESCRIPTION - PROGRESSION: CLINICAL_PROGRESSION: GRADUALLY IMPROVING

## 2024-09-25 NOTE — TELEPHONE ENCOUNTER
Patient states she is in [facility] ER now. States as soon as her daughter gets to ED to pick her up she is leaving. States she has waited 3 hrs. Nurse advised patient to stay at ED for tx. Advised and educated patient the importance of staying at ED for hydration if she is still not tolerating oral intake. Patient declines again stating that she is going to leave the ED. Sending to PCP as BHAVANA

## 2024-09-25 NOTE — ED TRIAGE NOTES
Patient has been having abdominal pain x1-2 weeks, nausea / vomiting and has not had a BM in 10 days. Patient is passing very little gas. Patient has been on a liquid diety x2 weeks and has not been able to tolerate PO including medications.

## 2024-09-25 NOTE — TELEPHONE ENCOUNTER
Patient daughter Rashida called in very upset stating her mom has been laying in bed for the last 2weeks, not being able to eat solid foods nor is she able to to take her thyroid or HTN meds as well without profusely vomiting the meds and food up immediately after. Pt has not had an BM in 2weeks and is also complaining of pain in her ABD. Pt daughter also stated pt is pale in color. Pt was referred to to the ER immediately which pt daughter Rashida proceeded to state that pt brother took her to the ER where she waited for 5hrs in the waiting room which pt did leave without being seen. I then suggested calling the EMS to transport to the pt to the ER. Rashida did schedule an follow up appt for 10/8/2024 for pt sx.

## 2024-09-26 PROBLEM — K56.7 ILEUS (MULTI): Status: ACTIVE | Noted: 2024-09-26

## 2024-09-26 LAB
ALBUMIN SERPL BCP-MCNC: 3.7 G/DL (ref 3.4–5)
ANION GAP SERPL CALC-SCNC: 16 MMOL/L (ref 10–20)
ATRIAL RATE: 59 BPM
BASOPHILS # BLD AUTO: 0.05 X10*3/UL (ref 0–0.1)
BASOPHILS NFR BLD AUTO: 0.5 %
BUN SERPL-MCNC: 11 MG/DL (ref 6–23)
CALCIUM SERPL-MCNC: 8.9 MG/DL (ref 8.6–10.6)
CHLORIDE SERPL-SCNC: 104 MMOL/L (ref 98–107)
CO2 SERPL-SCNC: 23 MMOL/L (ref 21–32)
CREAT SERPL-MCNC: 0.94 MG/DL (ref 0.5–1.05)
EGFRCR SERPLBLD CKD-EPI 2021: 64 ML/MIN/1.73M*2
EOSINOPHIL # BLD AUTO: 0.07 X10*3/UL (ref 0–0.4)
EOSINOPHIL NFR BLD AUTO: 0.7 %
ERYTHROCYTE [DISTWIDTH] IN BLOOD BY AUTOMATED COUNT: 14.2 % (ref 11.5–14.5)
GLUCOSE SERPL-MCNC: 58 MG/DL (ref 74–99)
HCT VFR BLD AUTO: 36.3 % (ref 36–46)
HGB BLD-MCNC: 11.5 G/DL (ref 12–16)
IMM GRANULOCYTES # BLD AUTO: 0.05 X10*3/UL (ref 0–0.5)
IMM GRANULOCYTES NFR BLD AUTO: 0.5 % (ref 0–0.9)
LYMPHOCYTES # BLD AUTO: 2.38 X10*3/UL (ref 0.8–3)
LYMPHOCYTES NFR BLD AUTO: 23.7 %
MAGNESIUM SERPL-MCNC: 1.94 MG/DL (ref 1.6–2.4)
MCH RBC QN AUTO: 26.6 PG (ref 26–34)
MCHC RBC AUTO-ENTMCNC: 31.7 G/DL (ref 32–36)
MCV RBC AUTO: 84 FL (ref 80–100)
MONOCYTES # BLD AUTO: 0.6 X10*3/UL (ref 0.05–0.8)
MONOCYTES NFR BLD AUTO: 6 %
NEUTROPHILS # BLD AUTO: 6.9 X10*3/UL (ref 1.6–5.5)
NEUTROPHILS NFR BLD AUTO: 68.6 %
NRBC BLD-RTO: 0 /100 WBCS (ref 0–0)
P AXIS: 57 DEGREES
P OFFSET: 185 MS
P ONSET: 138 MS
PHOSPHATE SERPL-MCNC: 3.2 MG/DL (ref 2.5–4.9)
PLATELET # BLD AUTO: 444 X10*3/UL (ref 150–450)
POTASSIUM SERPL-SCNC: 3.3 MMOL/L (ref 3.5–5.3)
PR INTERVAL: 156 MS
Q ONSET: 216 MS
QRS COUNT: 9 BEATS
QRS DURATION: 82 MS
QT INTERVAL: 424 MS
QTC CALCULATION(BAZETT): 419 MS
QTC FREDERICIA: 421 MS
R AXIS: -39 DEGREES
RBC # BLD AUTO: 4.33 X10*6/UL (ref 4–5.2)
SODIUM SERPL-SCNC: 140 MMOL/L (ref 136–145)
T AXIS: 64 DEGREES
T OFFSET: 428 MS
T4 FREE SERPL-MCNC: 1.22 NG/DL (ref 0.78–1.48)
TSH SERPL-ACNC: 7.44 MIU/L (ref 0.44–3.98)
VENTRICULAR RATE: 59 BPM
WBC # BLD AUTO: 10.1 X10*3/UL (ref 4.4–11.3)

## 2024-09-26 PROCEDURE — 97530 THERAPEUTIC ACTIVITIES: CPT | Mod: GP | Performed by: PHYSICAL THERAPIST

## 2024-09-26 PROCEDURE — 2500000001 HC RX 250 WO HCPCS SELF ADMINISTERED DRUGS (ALT 637 FOR MEDICARE OP)

## 2024-09-26 PROCEDURE — 99223 1ST HOSP IP/OBS HIGH 75: CPT

## 2024-09-26 PROCEDURE — 1210000001 HC SEMI-PRIVATE ROOM DAILY

## 2024-09-26 PROCEDURE — 84443 ASSAY THYROID STIM HORMONE: CPT

## 2024-09-26 PROCEDURE — 99222 1ST HOSP IP/OBS MODERATE 55: CPT | Performed by: SURGERY

## 2024-09-26 PROCEDURE — 2500000005 HC RX 250 GENERAL PHARMACY W/O HCPCS

## 2024-09-26 PROCEDURE — 83735 ASSAY OF MAGNESIUM: CPT

## 2024-09-26 PROCEDURE — S4991 NICOTINE PATCH NONLEGEND: HCPCS

## 2024-09-26 PROCEDURE — 97161 PT EVAL LOW COMPLEX 20 MIN: CPT | Mod: GP | Performed by: PHYSICAL THERAPIST

## 2024-09-26 PROCEDURE — 80069 RENAL FUNCTION PANEL: CPT

## 2024-09-26 PROCEDURE — 2500000004 HC RX 250 GENERAL PHARMACY W/ HCPCS (ALT 636 FOR OP/ED): Mod: SE

## 2024-09-26 PROCEDURE — 2500000004 HC RX 250 GENERAL PHARMACY W/ HCPCS (ALT 636 FOR OP/ED)

## 2024-09-26 PROCEDURE — 36415 COLL VENOUS BLD VENIPUNCTURE: CPT

## 2024-09-26 PROCEDURE — 96376 TX/PRO/DX INJ SAME DRUG ADON: CPT

## 2024-09-26 PROCEDURE — 84439 ASSAY OF FREE THYROXINE: CPT

## 2024-09-26 PROCEDURE — 2500000002 HC RX 250 W HCPCS SELF ADMINISTERED DRUGS (ALT 637 FOR MEDICARE OP, ALT 636 FOR OP/ED)

## 2024-09-26 PROCEDURE — 85025 COMPLETE CBC W/AUTO DIFF WBC: CPT

## 2024-09-26 RX ORDER — GABAPENTIN 300 MG/1
300 CAPSULE ORAL NIGHTLY
COMMUNITY

## 2024-09-26 RX ORDER — SENNOSIDES 8.6 MG/1
2 TABLET ORAL 2 TIMES DAILY
Status: DISCONTINUED | OUTPATIENT
Start: 2024-09-26 | End: 2024-09-28 | Stop reason: HOSPADM

## 2024-09-26 RX ORDER — ONDANSETRON HYDROCHLORIDE 2 MG/ML
4 INJECTION, SOLUTION INTRAVENOUS ONCE
Status: COMPLETED | OUTPATIENT
Start: 2024-09-26 | End: 2024-09-26

## 2024-09-26 RX ORDER — VENLAFAXINE 25 MG/1
25 TABLET ORAL 2 TIMES DAILY
Status: DISCONTINUED | OUTPATIENT
Start: 2024-09-26 | End: 2024-09-28 | Stop reason: HOSPADM

## 2024-09-26 RX ORDER — LISINOPRIL 10 MG/1
5 TABLET ORAL DAILY
Status: DISCONTINUED | OUTPATIENT
Start: 2024-09-26 | End: 2024-09-28 | Stop reason: HOSPADM

## 2024-09-26 RX ORDER — GABAPENTIN 100 MG/1
100 CAPSULE ORAL EVERY MORNING
Status: DISCONTINUED | OUTPATIENT
Start: 2024-09-26 | End: 2024-09-28 | Stop reason: HOSPADM

## 2024-09-26 RX ORDER — ASPIRIN 81 MG/1
81 TABLET ORAL EVERY 24 HOURS
Status: DISCONTINUED | OUTPATIENT
Start: 2024-09-26 | End: 2024-09-28 | Stop reason: HOSPADM

## 2024-09-26 RX ORDER — POLYETHYLENE GLYCOL 3350 17 G/17G
17 POWDER, FOR SOLUTION ORAL DAILY
Status: DISCONTINUED | OUTPATIENT
Start: 2024-09-26 | End: 2024-09-28 | Stop reason: HOSPADM

## 2024-09-26 RX ORDER — POTASSIUM CHLORIDE 20 MEQ/1
10 TABLET, EXTENDED RELEASE ORAL DAILY
Status: DISCONTINUED | OUTPATIENT
Start: 2024-09-26 | End: 2024-09-28 | Stop reason: HOSPADM

## 2024-09-26 RX ORDER — AMLODIPINE BESYLATE 5 MG/1
5 TABLET ORAL DAILY
Status: DISCONTINUED | OUTPATIENT
Start: 2024-09-26 | End: 2024-09-28 | Stop reason: HOSPADM

## 2024-09-26 RX ORDER — PANTOPRAZOLE SODIUM 40 MG/1
40 TABLET, DELAYED RELEASE ORAL
Status: DISCONTINUED | OUTPATIENT
Start: 2024-09-26 | End: 2024-09-28 | Stop reason: HOSPADM

## 2024-09-26 RX ORDER — ENOXAPARIN SODIUM 100 MG/ML
40 INJECTION SUBCUTANEOUS EVERY 24 HOURS
Status: DISCONTINUED | OUTPATIENT
Start: 2024-09-26 | End: 2024-09-28 | Stop reason: HOSPADM

## 2024-09-26 RX ORDER — LEVOTHYROXINE SODIUM 50 UG/1
50 TABLET ORAL DAILY
Status: DISCONTINUED | OUTPATIENT
Start: 2024-09-26 | End: 2024-09-28 | Stop reason: HOSPADM

## 2024-09-26 RX ORDER — LIDOCAINE 560 MG/1
1 PATCH PERCUTANEOUS; TOPICAL; TRANSDERMAL DAILY
Status: DISCONTINUED | OUTPATIENT
Start: 2024-09-26 | End: 2024-09-28 | Stop reason: HOSPADM

## 2024-09-26 RX ORDER — METOPROLOL SUCCINATE 50 MG/1
25 TABLET, EXTENDED RELEASE ORAL DAILY
Status: DISCONTINUED | OUTPATIENT
Start: 2024-09-26 | End: 2024-09-28 | Stop reason: HOSPADM

## 2024-09-26 RX ORDER — MORPHINE SULFATE 4 MG/ML
4 INJECTION INTRAVENOUS ONCE
Status: COMPLETED | OUTPATIENT
Start: 2024-09-26 | End: 2024-09-26

## 2024-09-26 RX ORDER — ALBUTEROL SULFATE 90 UG/1
2 INHALANT RESPIRATORY (INHALATION) EVERY 6 HOURS PRN
Status: DISCONTINUED | OUTPATIENT
Start: 2024-09-26 | End: 2024-09-28 | Stop reason: HOSPADM

## 2024-09-26 RX ORDER — IBUPROFEN 200 MG
1 TABLET ORAL DAILY
Status: DISCONTINUED | OUTPATIENT
Start: 2024-09-26 | End: 2024-09-28 | Stop reason: HOSPADM

## 2024-09-26 RX ORDER — DICLOFENAC SODIUM 10 MG/G
4 GEL TOPICAL 2 TIMES DAILY PRN
Status: DISCONTINUED | OUTPATIENT
Start: 2024-09-26 | End: 2024-09-28 | Stop reason: HOSPADM

## 2024-09-26 RX ORDER — POTASSIUM CHLORIDE 1.5 G/1.58G
40 POWDER, FOR SOLUTION ORAL ONCE
Status: COMPLETED | OUTPATIENT
Start: 2024-09-26 | End: 2024-09-26

## 2024-09-26 RX ORDER — ROSUVASTATIN CALCIUM 20 MG/1
20 TABLET, COATED ORAL DAILY
Status: DISCONTINUED | OUTPATIENT
Start: 2024-09-26 | End: 2024-09-28 | Stop reason: HOSPADM

## 2024-09-26 RX ORDER — POLYETHYLENE GLYCOL 3350, SODIUM CHLORIDE, SODIUM BICARBONATE, POTASSIUM CHLORIDE 420; 11.2; 5.72; 1.48 G/4L; G/4L; G/4L; G/4L
2000 POWDER, FOR SOLUTION ORAL ONCE AS NEEDED
Status: DISCONTINUED | OUTPATIENT
Start: 2024-09-26 | End: 2024-09-28 | Stop reason: HOSPADM

## 2024-09-26 RX ORDER — TIZANIDINE 4 MG/1
4 TABLET ORAL 3 TIMES DAILY PRN
Status: DISCONTINUED | OUTPATIENT
Start: 2024-09-26 | End: 2024-09-28 | Stop reason: HOSPADM

## 2024-09-26 RX ORDER — ONDANSETRON HYDROCHLORIDE 2 MG/ML
4 INJECTION, SOLUTION INTRAVENOUS EVERY 6 HOURS PRN
Status: DISCONTINUED | OUTPATIENT
Start: 2024-09-26 | End: 2024-09-28 | Stop reason: HOSPADM

## 2024-09-26 ASSESSMENT — PAIN SCALES - GENERAL
PAINLEVEL_OUTOF10: 0 - NO PAIN
PAINLEVEL_OUTOF10: 3
PAINLEVEL_OUTOF10: 3
PAINLEVEL_OUTOF10: 7

## 2024-09-26 ASSESSMENT — COGNITIVE AND FUNCTIONAL STATUS - GENERAL
MOBILITY SCORE: 17
TURNING FROM BACK TO SIDE WHILE IN FLAT BAD: A LITTLE
MOVING TO AND FROM BED TO CHAIR: A LITTLE
MOVING FROM LYING ON BACK TO SITTING ON SIDE OF FLAT BED WITH BEDRAILS: A LITTLE
CLIMB 3 TO 5 STEPS WITH RAILING: A LOT
STANDING UP FROM CHAIR USING ARMS: A LITTLE
WALKING IN HOSPITAL ROOM: A LITTLE

## 2024-09-26 ASSESSMENT — PAIN - FUNCTIONAL ASSESSMENT
PAIN_FUNCTIONAL_ASSESSMENT: 0-10

## 2024-09-26 ASSESSMENT — LIFESTYLE VARIABLES
HAVE PEOPLE ANNOYED YOU BY CRITICIZING YOUR DRINKING: NO
EVER HAD A DRINK FIRST THING IN THE MORNING TO STEADY YOUR NERVES TO GET RID OF A HANGOVER: NO
TOTAL SCORE: 0
EVER FELT BAD OR GUILTY ABOUT YOUR DRINKING: NO
HAVE YOU EVER FELT YOU SHOULD CUT DOWN ON YOUR DRINKING: NO

## 2024-09-26 ASSESSMENT — PAIN DESCRIPTION - ORIENTATION: ORIENTATION: UPPER

## 2024-09-26 ASSESSMENT — PAIN DESCRIPTION - LOCATION: LOCATION: ABDOMEN

## 2024-09-26 ASSESSMENT — PAIN SCALES - WONG BAKER
WONGBAKER_NUMERICALRESPONSE: HURTS LITTLE MORE
WONGBAKER_NUMERICALRESPONSE: HURTS LITTLE BIT

## 2024-09-26 ASSESSMENT — ACTIVITIES OF DAILY LIVING (ADL): ADL_ASSISTANCE: INDEPENDENT

## 2024-09-26 NOTE — ED PROVIDER NOTES
"Emergency Department Provider Note        History of Present Illness     History provided by: Patient and Family Member  Limitations to History: None  External Records Reviewed with Brief Summary: None    HPI:  Indu Red is a 73 y.o. female with PMH of HTN/HLD, CKD, hypothyroidism presented to the ED with daughter at bedside reporting intermittent and progressively worsening lower abdominal pain for the last 2 weeks.  Patient notes that she has not had a bowel movement for now 14 days, reports that she feels \"gassy\" but has not really been passing gas.  Patient now also has progressively been having worsening nausea, vomiting, intermittent chills and night sweats.  Patient states that she was initially able to eat but with the persistent vomiting transition to liquid diet, however continue to have vomiting with liquids and has been unable to tolerate anything for the last couple days, including her medications.  Patient notes this nonbloody, non bilious vomiting.  Patient denies chest pain, shortness of breath, fever, or dysuria.     Physical Exam   Triage vitals:  T 36.1 °C (97 °F)  HR 61  /77  RR 16  O2 99 % None (Room air)    General: Awake, alert, in no acute distress, ill-appearing and uncomfortable  Eyes: Gaze conjugate.  No scleral icterus or injection  HENT: Normo-cephalic, atraumatic. No stridor, dry mucous membranes.  CV: Regular rate, regular rhythm. Radial pulses 2+ bilaterally  Resp: Breathing non-labored, speaking in full sentences.  Clear to auscultation bilaterally  GI: Soft, mildly distended, diffuse generalized abdominal tenderness, worse in the lower abdomen. No rebound or guarding.  MSK/Extremities: No gross bony deformities. Moving all extremities  Skin: Warm. Appropriate color  Neuro: Alert. Oriented. Face symmetric. Speech is fluent.  Gross strength and sensation intact in b/l UE and LEs  Psych: Appropriate mood and affect    Medical Decision Making & ED Course   Medical Decision " Makin y.o. female with PMH of HTN/HLD, CKD, hypothyroidism presenting with 2 weeks of worsening lower abdominal pain, nausea, nonbloody/nonbilious vomiting, constipation.  Given persistence of pain and vomiting with inability to not tolerate p.o. meds or any sort of diet and in the setting of constipation with decreased flatulence hide concern for SBO.  CBC showed slightly elevated WBC at 11.7, low concern for infectious etiology given patient is an afebrile non tachycardic, otherwise CBC unremarkable with normal hemoglobin hematocrit and low concern for acute blood loss or anemia.  CMP was remarkable for mild anion gap, and stat VBG showed pH to 7.38 patient has no metabolic acidosis at this time. Lactate was not elevated, lower concern for mesenteric ischemia.  Patient was noted to have a mildly elevated lipase, however no prior recorded lipase unclear if this is a chronic in nature.  Will treat pain and nausea with morphine and Zofran.  Given CT abdomen IV contrast findings, will consult ACS.   ----    Differential diagnoses considered include but are not limited to: SBO, ileus, mesenteric ischemia, diverticulitis, pancreatitis, fecal impaction, constipation, colonic obstruction, [place] syndrome     Social Determinants of Health which Significantly Impact Care: None identified     EKG Independent Interpretation: The EKG obtained at 1848 was independently interpreted by myself. It demonstrates sinus bradycardia with a ventricular rate of 59.  Left axis. Intervals normal. ST segments showed no acute elevations or depressions, no T wave changes.     Independent Result Review and Interpretation: Relevant laboratory and radiographic results were reviewed and independently interpreted by myself.  As necessary, they are commented on in the ED Course.    Chronic conditions affecting the patient's care: As documented above in MDM    The patient was discussed with the following consultants/services:  ACS    ED  Course:  ED Course as of 09/27/24 1030   Wed Sep 25, 2024   2129 UA noted to have RBCs otherwise negative nitrite or leukocyte esterase, unlikely UTI. [SANCHO]   Thu Sep 26, 2024   0043 Mild nonspecific fluid diffusely throughout multiple prominent but not overtly dilated small bowel loops as well as the proximal colon.  No discrete transition point. Considerations include but are not limited to ileus, enterocolitis, or (less likely) partial obstruction without visualized transition point.  Will consult ACS.   [SANCHO]      ED Course User Index  [SANCHO] Sergey Galeano DO         Diagnoses as of 09/27/24 1030   Ileus (Multi)     Disposition   Patient was signed out to Dr. Lopes at 0200 pending completion of their work-up.  Please see the next provider's transition of care note for the remainder of the patient's care.     As a result of their workup, the patient will require admission to the hospital.  The patient was informed of her diagnosis.  The patient was given the opportunity to ask questions and I answered them. The patient agreed to be admitted to the hospital.    Procedures   Procedures    Patient seen and discussed with ED attending physician.    Sergey Galeano DO  Emergency Medicine     Sergey Galeano DO  Resident  09/27/24 1032

## 2024-09-26 NOTE — CONSULTS
[facility]  ACUTE CARE SURGERY - HISTORY AND PHYSICAL / CONSULT    Patient Name: Indu Red  MRN: [id number]  Admit Date: 925  : 1950  AGE: 73 y.o.   GENDER: female  ==============================================================================  TODAY'S ASSESSMENT AND PLAN OF CARE:  73 year-old female with a past medical history of HTN, HLD, CKD, MS, RA, hypothyroidism, esophageal ulcers, and GERD who presents to the ED with 2 weeks of abdominal pain and constipation. CT abd/pel performed and demonstrated diffuse mild small bowel dilatation and proximal dilatation without discrete transition point. ACS consulted to evaluate for small bowel obstruction. Vital signs stable. No concerning findings on abdominal exam. Labs unremarkable with WBC upper limit of normal 11.7.     Recommendations:  - no acute surgical intervention indicated; very low concern for closed loop SBO  - recommend tap water enema to relieve stool burden/constipation  - agree with admission to medicine team  - page/call with questions or concerns    Tia Arguello MD  General Surgery PGY-2  ACS [id number]  ==============================================================================  CHIEF COMPLAINT/REASON FOR CONSULT:  Small bowel obstruction    73 year-old female with a past medical history of HTN, HLD, CKD, MS, RA, hypothyroidism, esophageal ulcers, and GERD who presents to the ED with 2 weeks of abdominal pain and constipation. CT abd/pel performed and demonstrated diffuse mild small bowel dilatation and proximal dilatation without discrete transition point as well as a large stool burden in the distal colon. ACS consulted to evaluate for small bowel obstruction. Vital signs stable. No concerning findings on abdominal exam. Labs unremarkable with WBC upper limit of normal 11.7.     Per patient, she has add abdominal discomfort for months, however it has worsened over the last 2 weeks. She also states  that she has not had a bowel movement for >1 week. States that she feels bloated and wants to pass gas, but has been unable to. She has not taken any stool softeners or laxatives. She endorses nausea and emesis. She also endorses decreased appetite, 10-20 lb weight loss, fatigue, chills, night sweats over the last 6 months. Last colonoscopy was in 2019, which showed sessile and adenomatous polyps, which were removed. She was due for another colonoscopy in 2023 but was unable to make it. Up until 2 weeks ago, patient stated that she had normal daily bowel movements.     PAST MEDICAL HISTORY:   PMH:   Past Medical History:   Diagnosis Date    Anemia     Esophagitis     Gastroesophageal reflux disease due to diaphragmatic hernia     Goiter     Hypertension     Left shoulder pain     Multiple sclerosis (Multi)     Personal history of other diseases of the female genital tract 08/03/2016    History of ovarian cyst    Personal history of other diseases of the respiratory system 01/19/2017    History of acute bronchitis    Personal history of other specified conditions 08/03/2016    History of wheezing    Postoperative urinary retention     Shoulder pain, right     Ulcer of esophagus without bleeding     Esophageal erosions       PSH:   Past Surgical History:   Procedure Laterality Date    APPENDECTOMY  08/03/2016    COLONOSCOPY  08/03/2016    With polypectomy    ESOPHAGOGASTRODUODENOSCOPY      FOOT SURGERY Left     MR HEAD ANGIO WO IV CONTRAST  07/21/2023    MR HEAD ANGIO WO IV CONTRAST 7/21/2023 [facility]    MR NECK ANGIO WO IV CONTRAST  07/21/2023    MR NECK ANGIO WO IV CONTRAST 7/21/2023 [facility]    OOPHORECTOMY  08/03/2016    OTHER SURGICAL HISTORY  08/03/2016    Cervical Conization    SALPINGECTOMY      SHOULDER SURGERY Right 02/13/2024    TEMPOROMANDIBULAR JOINT SURGERY      THYROIDECTOMY, PARTIAL      Substernal    TOTAL VAGINAL HYSTERECTOMY  03/09/2017     FH:   Family History   Problem Relation Name Age of Onset     Other (PCI) Mother  80 - 89        Stent    Heart disease Mother      Hypertension Mother      Stomach cancer Father       SOCIAL HISTORY:    Smoking: daily smoker since age 23    Alcohol: denies    Drug use: denies    MEDICATIONS:   Prior to Admission medications    Medication Sig Start Date End Date Taking? Authorizing Provider   albuterol 90 mcg/actuation inhaler Inhale 2 puffs every 6 hours if needed for wheezing or shortness of breath. 1/19/17   Historical Provider, MD   amLODIPine (Norvasc) 5 mg tablet Take 1 tablet (5 mg) by mouth once daily. 6/19/24 6/19/25  Otto Momin MD   aspirin 81 mg EC tablet 1 tablet (81 mg) once every 24 hours. AM    Historical Provider, MD   cholecalciferol (Vitamin D3) 25 MCG (1000 UT) tablet Take 1 tablet (25 mcg) by mouth once daily. AM    Historical Provider, MD   diclofenac sodium (Voltaren) 1 % gel Apply 4.5 inches (4 g) topically 2 times a day as needed (pain). 5/6/24   Don Jett MD   gabapentin (Neurontin) 100 mg capsule Take 1 capsule (100 mg) by mouth once daily in the morning. 6/19/24 6/19/25  Otto Momin MD   ipratropium (Atrovent) 42 mcg (0.06 %) nasal spray 2 sprays 4 times a day as needed for rhinitis.    Historical Provider, MD   levothyroxine (Synthroid, Levoxyl) 50 mcg tablet Take 1 tablet (50 mcg) by mouth once daily. 6/19/24   Otto Momin MD   lisinopril 5 mg tablet Take 1 tablet (5 mg) by mouth once daily. 6/19/24 6/19/25  Otto Momin MD   metoprolol succinate XL (Toprol-XL) 25 mg 24 hr tablet TAKE 1 TABLET BY MOUTH EVERY DAY FOR 90 DAYS 8/5/24 7/31/25  Vance Majano DO   nicotine (Nicoderm CQ) 14 mg/24 hr patch Place 1 patch over 24 hours on the skin once daily. 6/6/24 7/6/24  RONNIE Ibarra-CNP   ondansetron ODT (Zofran-ODT) 4 mg disintegrating tablet Take 1 tablet (4 mg) by mouth every 8 hours if needed for nausea or vomiting. 8/8/24 8/8/25  Otto Momin MD   pantoprazole (ProtoNix) 40 mg EC tablet Take 1 tablet (40 mg) by mouth once daily  "in the morning. Take before meals. 8/8/24 8/3/25  Otto Momin MD   potassium chloride CR 10 mEq ER tablet Take 1 tablet (10 mEq) by mouth once daily. DO NOT CRUSH CHEW OR SPLIT 7/19/24 7/19/25  Otto Momin MD   rosuvastatin (Crestor) 20 mg tablet Take 1 tablet (20 mg) by mouth once daily. 6/19/24 7/24/25  Otto Momin MD   teriflunomide (AUBAGIO ORAL) Take 14 mg by mouth once daily. EVERY AFTERNOON FOR TREATMENT OF MS    Historical Provider, MD   teriparatide (Forteo) injection Inject 0.08 mL (20 mcg) under the skin once daily. . REFRIGERATE AND DISCARD PEN 28 DAYS AFTER INITIAL USE.  MID AFTERNOON FOR OSTEOPOROSIS    Historical Provider, MD   tiZANidine (Zanaflex) 4 mg tablet TAKE 1 TABLET BY MOUTH THREE TIMES A DAY AS NEEDED 5/10/24   Celina Nielson APRN-CNP   traMADol (Ultram) 50 mg tablet Take by mouth.    Historical Provider, MD   venlafaxine (Effexor) 25 mg tablet TAKE 1 TABLET BY MOUTH TWICE A DAY 3/6/24   Don Jett MD     ALLERGIES:   Allergies   Allergen Reactions    Other Anaphylaxis    Tetracyclines Anaphylaxis, Unknown, Nausea And Vomiting and Nausea Only    Oxycodone Nausea And Vomiting and Nausea Only    Oxycodone-Acetaminophen Unknown and Other     vomiting    Penicillins Other and Unknown     \"I STOP BREATHING\"    Pseudoephedrine Nausea And Vomiting and Nausea Only    Acetaminophen-Codeine Unknown    Codeine Nausea/vomiting     DIZZINESS    Psyllium Nausea/vomiting     BLOATING    Sulfa (Sulfonamide Antibiotics) Rash       REVIEW OF SYSTEMS:  ROS: 12-point review of system performed and is negative except as stated in HPI.    PHYSICAL EXAM:  General: pleasant, awake, alert, no acute distress  HEENT: atraumatic, normocephalic  CV: regular rate and rhythm  Pulm: non-labored breathing on room air  GI: abdomen soft, non-distended, non-tender to palpation  : AMARJIT performed at bedside with chaperone; stool burden palpated; no rectal masses or lesions noted on exam; no bleeding; increased " rectal tone  Skin: warm, dry  Extremities: BOOTH  Psych: appropriate mood and affect    IMAGING SUMMARY:  CT abd/pel 9/25/24:  IMPRESSION:  Mild nonspecific fluid diffusely throughout multiple prominent but  not overtly dilated small bowel loops as well as the proximal colon.  No discrete transition point. Considerations include but are not  limited to ileus, enterocolitis, or (less likely) partial obstruction  without visualized transition point.      LABS:  Results from last 7 days   Lab Units 09/25/24 2051   WBC AUTO x10*3/uL 11.7*   HEMOGLOBIN g/dL 12.7   HEMATOCRIT % 40.2   PLATELETS AUTO x10*3/uL 412   NEUTROS PCT AUTO % 72.9   LYMPHS PCT AUTO % 19.8   MONOS PCT AUTO % 5.9   EOS PCT AUTO % 0.5         Results from last 7 days   Lab Units 09/25/24 2051   SODIUM mmol/L 134*   POTASSIUM mmol/L 3.6   CHLORIDE mmol/L 100   CO2 mmol/L 15*   BUN mg/dL 13   CREATININE mg/dL 0.98   CALCIUM mg/dL 9.5   PROTEIN TOTAL g/dL 6.6   BILIRUBIN TOTAL mg/dL 0.5   ALK PHOS U/L 76   ALT U/L 5*   AST U/L 10   GLUCOSE mg/dL 72*     Results from last 7 days   Lab Units 09/25/24 2051   BILIRUBIN TOTAL mg/dL 0.5             I have reviewed all laboratory and imaging results ordered/pertinent for this encounter.     I saw and evaluated the patient. I personally obtained the key and critical portions of the history and physical exam. I reviewed the resident’s documentation and discussed the patient with the resident. I agree with the resident’s medical decision making as documented in the resident’s note.    73F with h/o GERD who presents with several weeks of constipation and lower abdominal pain, which has been significantly over the last week and associated with fever/chills and nausea/vomiting. ACS consulted with concern for possible bowel obstruction. On exam, pt sleeping comfortably but easily woken. Reports persistent crampy pain across the lower abdomen but is hungry. Vitals normal. Abdomen mildly distended but soft. Tender to  deep palpation in lower quadrants. Labs notable for wbc 11.7, lipase 153, BD 0.2, Lactate 0.8. On review of the CT there are multiple mildly dilated and fluid filled loops of small bowel without a transition point, and a large amount of stool in the rectum and extending to the distal transverse colon. Pancreas normal. Her most recent colonoscopy was in 2019 which was largely normal except for several polyps. At this point I suspect the patient most likely has some element of enteritis/colitis which is exacerbating constipation. No signs of small bowel obstruction. Agree with medicine admission.    Isacc Valdez MD  Trauma, Critical Care, and Acute Care Surgery  Pager: [phone]

## 2024-09-26 NOTE — PROGRESS NOTES
Pharmacy Medication History Review    Indu Red is a 73 y.o. female admitted for Ileus (Multi). Pharmacy reviewed the patient's [id number] medications and allergies for accuracy.    Medications ADDED:  Gabapentin 300mg at bedtime-  On 24 patient reports still taking at bedtime.  Last filled in Dec 2023 for 360 capsules / 90 day supply (originally prescribed to take up to 4 times a day)  Medications CHANGED:  none  Medications REMOVED:   Teriflunomide (per patient and chart review patient no longer taking-  no recent pharmacy fill history)    The list below reflects the updated PTA list.   Prior to Admission Medications   Prescriptions Last Dose Informant   albuterol 90 mcg/actuation inhaler More than a month Self   Sig: Inhale 2 puffs every 6 hours if needed for wheezing or shortness of breath.   amLODIPine (Norvasc) 5 mg tablet 2024 Self   Sig: Take 1 tablet (5 mg) by mouth once daily.   aspirin 81 mg EC tablet 2024 Self   Si tablet (81 mg) once every 24 hours. AM   cholecalciferol (Vitamin D3) 25 MCG (1000 UT) tablet 2024 Self   Sig: Take 1 tablet (25 mcg) by mouth once daily. AM   diclofenac sodium (Voltaren) 1 % gel Past Week Self   Sig: Apply 4.5 inches (4 g) topically 2 times a day as needed (pain).   gabapentin (Neurontin) 100 mg capsule 2024 Self   Sig: Take 1 capsule (100 mg) by mouth once daily in the morning.   ipratropium (Atrovent) 42 mcg (0.06 %) nasal spray Past Week Self   Si sprays 4 times a day as needed for rhinitis.   levothyroxine (Synthroid, Levoxyl) 50 mcg tablet 2024 Self   Sig: Take 1 tablet (50 mcg) by mouth once daily.   lisinopril 5 mg tablet 2024 Self   Sig: Take 1 tablet (5 mg) by mouth once daily.   metoprolol succinate XL (Toprol-XL) 25 mg 24 hr tablet 2024 Self   Sig: TAKE 1 TABLET BY MOUTH EVERY DAY FOR 90 DAYS   nicotine (Nicoderm CQ) 14 mg/24 hr patch     Sig: Place 1 patch over 24 hours on the skin once daily.  "  ondansetron ODT (Zofran-ODT) 4 mg disintegrating tablet 9/25/2024 Self   Sig: Take 1 tablet (4 mg) by mouth every 8 hours if needed for nausea or vomiting.   pantoprazole (ProtoNix) 40 mg EC tablet 9/25/2024 Self   Sig: Take 1 tablet (40 mg) by mouth once daily in the morning. Take before meals.   potassium chloride CR 10 mEq ER tablet 9/25/2024 Self   Sig: Take 1 tablet (10 mEq) by mouth once daily. DO NOT CRUSH CHEW OR SPLIT   rosuvastatin (Crestor) 20 mg tablet 9/25/2024 Self   Sig: Take 1 tablet (20 mg) by mouth once daily.   tiZANidine (Zanaflex) 4 mg tablet 9/25/2024 Self   Sig: TAKE 1 TABLET BY MOUTH THREE TIMES A DAY AS NEEDED   traMADol (Ultram) 50 mg tablet 9/25/2024 Self   Sig: Take by mouth.   venlafaxine (Effexor) 25 mg tablet 9/25/2024 Self   Sig: TAKE 1 TABLET BY MOUTH TWICE A DAY      Gabapentin 300mg capsule    Take 1 capsule by mouth at bedtime        The list below reflects the updated allergy list. Please review each documented allergy for additional clarification and justification.  Allergies  Reviewed by Raphael Gutierrez on 9/26/2024        Severity Reactions Comments    Other High Anaphylaxis     Tetracyclines High Anaphylaxis, Unknown, Nausea And Vomiting, Nausea Only     Oxycodone Medium Nausea And Vomiting, Nausea Only     Oxycodone-acetaminophen Medium Unknown, Other vomiting    Penicillins Medium Other, Unknown \"I STOP BREATHING\"    Pseudoephedrine Medium Nausea And Vomiting, Nausea Only     Acetaminophen-codeine Not Specified Unknown     Codeine Not Specified Nausea/vomiting DIZZINESS    Psyllium Not Specified Nausea/vomiting BLOATING    Sulfa (sulfonamide Antibiotics) Low Rash             Patient accepts M2B at discharge.     Sources:   Moderate Historian ( patient interview)  Dispense History   [facility]  PCP office visit note 8/8    Additional Comments:  Pt states she is taking Gabapentin 300mg at bedtime but it hasn't been filled since 12/23 90 day supply   Pt reports she has stopped " "medication prior to admission due to not being able to keep any solids or liquids down.      INDU AVALOS  Pharmacy Technician  09/26/24     Secure Chat preferred   If no response call [phone] or [employer] \"Med Rec\"    "

## 2024-09-26 NOTE — PROGRESS NOTES
Emergency Department Transition of Care Note       Signout   I received Indu Red in signout from Dr. Galeano.  Please see the ED Provider Note for all HPI, PE and MDM up to the time of signout at 0200.  This is in addition to the primary record.    In brief Indu Red is an 73 y.o. female PMHx HTN, HLD, CKD, hypothyroidism who presents to the emergency department with 2 weeks of abdominal pain, nausea/vomiting, and constipation.  CT abdomen pelvis was obtained and ACS was consulted    At the time of signout we were awaiting:  ACS recommendations    ED Course & Medical Decision Making   Medical Decision Making:  Per ACS, low concern for SBO, and suggest that the patient's presentation is more consistent with ileus so recommended nonoperative management and enema versus suppository.    I discussed the likely diagnosis of constipation and ileus with the patient.  Given her significant stool burden and inability to tolerate PO, recommended admission for treatment of her constipation and nausea control.  After shared decision-making discussion, patient was in agreement with plan for admission.    ED Course:  ED Course as of 09/26/24 0728   Wed Sep 25, 2024   2129 UA noted to have RBCs otherwise negative nitrite or leukocyte esterase, unlikely UTI. [SANCHO]   u Sep 26, 2024   0043 Mild nonspecific fluid diffusely throughout multiple prominent but not overtly dilated small bowel loops as well as the proximal colon.  No discrete transition point. Considerations include but are not limited to ileus, enterocolitis, or (less likely) partial obstruction without visualized transition point.  Will consult ACS.   [SANCHO]      ED Course User Index  [SANCHO] Sergey Galeano, DO         Diagnoses as of 09/26/24 0728   Ileus (Multi)       Disposition   As a result of their workup, the patient will require admission to the hospital.  The patient was informed of her diagnosis.  The patient was given the opportunity to ask questions and I  answered them. The patient agreed to be admitted to the hospital.      Patient seen and discussed with ED attending physician.    Kirstie Lopes MD  Emergency Medicine

## 2024-09-26 NOTE — H&P
History Of Present Illness  Ms. Indu Red is a 72 yo female with PMHx of HTN, HLD, MS, OA, GERD, hypothyroidism, GERD admitted for nausea and vomiting likely 2/2 to ileus and constipation.    Over the past 2-3 weeks, has had GI upset. Originally started with intermittent nausea, nonbloody, nonbilious vomiting for around 1-1.5 weeks. Felt nauseous after each meal. Had prior episodes of GI upset in the past that she tries to self-treat with cannabis, which usually helps, but did not help this time. Tried a soft diet, then liquid diet without improvement. Unable to tolerate anything for the past couple of days, including medications. Has been constipated as well during this time, unable to pass gas. Known history of diverticulosis, but has been drinking a lot of water, good fiber intake - fruits and veggies, avoiding nuts.    Of note, over the past 3-4 months, has had decreased appetite, endorses 10-20lb weight loss, fatigue, chills, night sweats. Also has noticed increasing weakness and lower back pain since her shoulder operation in 2024. Last colonoscopy  with poor prep, but 7 polyps removed. Was due for a repeat colonoscopy in , but was unable to scheduled the appt.    In the ED:  - Vitals:   T 36.1C, HR 61, /77, RR 16, SpO2 99 on RA  - Labs:   CBC: WBC 11.7, Hgb 12.7 (previously 2024 9.5), plt 412   BMP: Na 134, K 3.6, Cl 100, HCO3 15, , Cr 0.98, glu 72   LFT: Ca 9.5, tprot 6.6, alb 4.1, alkphos 76, AST 10, ALT 5, tbili 0.5   Electrolytes: Mg 1.7   Lipase 153   VBG 7.39/41, lacate 0.8   UA negative nitrite, leuk esterase, moderate blood  - Imagin24 CT AP w/ contrast  IMPRESSION:  Mild nonspecific fluid diffusely throughout multiple prominent but  not overtly dilated small bowel loops as well as the proximal colon.  No discrete transition point. Considerations include but are not  limited to ileus, enterocolitis, or (less likely) partial obstruction  without visualized transition  point.    In the ED, ACS consulted, low concern for close loop SBO. Recommended suppository vs enema for stool burden relief. 1L LR given. Suppository/enema/AMARJIT deferred as pt was in a curtain bed (non-private room).    - Scope Hx:  1/2024 EGD:  - Normal esophagus.   - Medium-sized hiatal hernia.   - Normal examined duodenum.   - No specimens collected.    11/2019 Colonoscopy:  Impression:              - Decreased sphincter tone found on digital rectal exam.  - Stool in the entire examined colon.  - The examined portion of the ileum was normal.  - Two 1 to 2 mm polyps in the cecum, removed with a cold snare. Resected and retrieved.  - Two 3 to 4 mm polyps in the transverse colon, removed with a cold snare. Resected and retrieved.  - Three 2 to 6 mm polyps in the descending colon, removed with a cold snare. Resected and retrieved.  - Moderate diverticulosis in the sigmoid colon and in the descending colon. There was no evidence of diverticular bleeding.    3/2016 Colonoscopy:  Impression:              - The examined portion of the ileum was normal.  - Two 3 to 4 mm polyps in the ascending colon. Resected and retrieved.  - One 18 mm polyp at 15 cm proximal to the anus. Resected and retrieved.  - Four benign appearing diminutive polyps in the rectum. Resected and retrieved.  - Mild diverticulosis in the sigmoid colon.  - The distal rectum and anal verge are normal on retroflexion view.     Past Medical History  She has a past medical history of Anemia, Esophagitis, Gastroesophageal reflux disease due to diaphragmatic hernia, Goiter, Hypertension, Left shoulder pain, Multiple sclerosis (Multi), Personal history of other diseases of the female genital tract (08/03/2016), Personal history of other diseases of the respiratory system (01/19/2017), Personal history of other specified conditions (08/03/2016), Postoperative urinary retention, Shoulder pain, right, and Ulcer of esophagus without bleeding.    Surgical  History  She has a past surgical history that includes Appendectomy (08/03/2016); Other surgical history (08/03/2016); Colonoscopy (08/03/2016); Oophorectomy (08/03/2016); Total vaginal hysterectomy (03/09/2017); MR angio neck wo IV contrast (07/21/2023); MR angio head wo IV contrast (07/21/2023); Thyroidectomy, partial; Salpingectomy; Temporomandibular joint surgery; Esophagogastroduodenoscopy; Foot surgery (Left); and Shoulder surgery (Right, 02/13/2024).     Social History  Lives with 94 yo mother in a house together, family down the street. 1/2 PPD for the past 16 years. Smokes marijuana occasionally. Denies other recreational drug use    Family History  Family History   Problem Relation Name Age of Onset    Other (PCI) Mother  80 - 89        Stent    Heart disease Mother      Hypertension Mother      Stomach cancer Father          Allergies  Other, Tetracyclines, Oxycodone, Oxycodone-acetaminophen, Penicillins, Pseudoephedrine, Acetaminophen-codeine, Codeine, Psyllium, and Sulfa (sulfonamide antibiotics)    Review of Systems  10 point review of systems was performed and is otherwise negative except as noted in HPI.      Physical Exam  Constitutional:       General: She is not in acute distress.     Appearance: Normal appearance.   HENT:      Mouth/Throat:      Mouth: Mucous membranes are moist.   Eyes:      Extraocular Movements: Extraocular movements intact.      Pupils: Pupils are equal, round, and reactive to light.   Cardiovascular:      Rate and Rhythm: Normal rate and regular rhythm.   Pulmonary:      Effort: Pulmonary effort is normal.      Breath sounds: Normal breath sounds.   Abdominal:      General: Abdomen is flat. There is distension (Tender to deep palpation in left quadrant).      Palpations: Abdomen is soft.   Skin:     General: Skin is warm and dry.   Neurological:      General: No focal deficit present.          Last Recorded Vitals  /77 (BP Location: Right arm, Patient Position: Lying)    Pulse 54   Temp 36.5 °C (97.7 °F) (Oral)   Resp 16   Wt 54.1 kg (119 lb 3.2 oz)   SpO2 98%     Relevant Results           Assessment/Plan   Assessment & Plan  Ileus (Multi)      Ms. Indu Red is a 72 yo female with PMHx of HTN, HLD, MS, OA, GERD, hypothyroidism, GERD admitted for nausea and vomiting likely 2/2 to ileus and constipation. Will start aggressive bowel regimen, consider enemas/suppositories when in a more private setting. NPO for bowel rest for now.    #Abdominal pain  #Nausea/vomiting  #Constipation  Abdominal pain likely combination of possible ileus, constipation, potentially some component of known diverticulosis. Tramadol use could be contributing as well  ::CT AP with prominent but not dilated bowel loops suggestive of ileus, enterocolitis  -Bowel regimen: miralax daily, senna BID, lactulose 30mg x1  -Deferring suppository, enema, AMARJIT for pt privacy; can consider when in private room  -IV zofran 4mg q6h PRN  -NPO for bowel rest    #Generalized weakness  #Lower back pain  -PT/OT consult  -continue voltaren PRN, venlafaxine 25mg, gabapentin 100mg  -holding home tramadol to prevent worsening constipation    #Leukocytosis  Likely reactive to dehydration, reassuring with normal lactate  -s/p 1L LR  -CTM    #Unintentional weight loss  ::10-20lb in the past 3-4 months with subjective fevers/chills  -TSH/T4  -Will need repeat colonoscopy outpatient    #MS   -Teriflunomide 14mg not on formulary, will ask pt to bring in    #DLD - continue rosuvastatin 20mg  #GERD - continue pantoprazole 40mg    #HTN - continue lisinopril 5mg, amlodipine 5mg  #Hypothyroidism - continue synthroid 50mcg  #Nicotine use - nicotine 14mg patch    #Misc - continue aspirin 81mg    F: none  E: replete PRN  N: NPO for bowel rest  A: PIV  DVT ppx: lovenox    Code Status: Full code  Surrogate Medical Decision-maker: Rashida Allan (daughter) [phone]         Jossy Vásquez MD  IM PGY-2

## 2024-09-26 NOTE — PROGRESS NOTES
Physical Therapy    Physical Therapy Evaluation & Treatment    Patient Name: Indu Red  MRN: [id number]  Department:   Room: [place] [address]/[id number]  Today's Date: 9/26/2024   Time Calculation  Start Time: 1109  Stop Time: 1132  Time Calculation (min): 23 min    Assessment/Plan   PT Assessment  PT Assessment Results: Decreased strength, Decreased endurance, Impaired balance, Decreased mobility  Rehab Prognosis: Good  End of Session Communication: Bedside nurse  Assessment Comment: Pt has noted deficits in strength, balance, mobility, and endurance. Pt would benefit from ongoing PT to address these deficits.  End of Session Patient Position: Bed, 2 rail up, Alarm off, not on at start of session (ED bed)   IP OR SWING BED PT PLAN  Inpatient or Swing Bed: Inpatient  PT Plan  Treatment/Interventions: Bed mobility, Transfer training, Gait training, Stair training, Balance training, Strengthening, Endurance training, Therapeutic exercise, Therapeutic activity  PT Plan: Ongoing PT  PT Frequency: 3 times per week  PT Discharge Recommendations: Low intensity level of continued care (OP PT)  PT Recommended Transfer Status: Stand by assist  PT - OK to Discharge: Yes      Subjective     General Visit Information:  General  Reason for Referral: Worsening obstipation, nausea, vomiting and abdominal pain. CT AP revealed prominent bowel loops suggestive of ileus or enterocolitis. Ileus (multi)  Past Medical History Relevant to Rehab: HTN, HLD, CKD, MS, RA, hypothyroidism, esophageal ulcers, and GERD, LBP, R RTC tear and surgery 2/24  Family/Caregiver Present: Yes  Caregiver Feedback: Sister  Prior to Session Communication: Bedside nurse  Patient Position Received: Bed, 2 rail up, Alarm off, not on at start of session (ED bed)  Preferred Learning Style: verbal  General Comment: Pt supine in bed with HOB elevated. Pt agreeable to participate in PT evaluation and reported she enjoys going to OP PT appointments 2-3x/ week.  Home  Living:  Home Living  Type of Home: House  Lives With: Parent(s) (93-year-old mother)  Home Adaptive Equipment: Walker rolling or standard, Cane  Home Layout: Multi-level, Laundry in basement, Stairs to alternate level with rails, Bed/bath upstairs  Alternate Level Stairs-Rails: Both  Alternate Level Stairs-Number of Steps: 8  Home Access: Stairs to enter with rails  Entrance Stairs-Rails: Both  Entrance Stairs-Number of Steps: 4  Bathroom Shower/Tub: Tub/shower unit  Prior Level of Function:  Prior Function Per Pt/Caregiver Report  Level of [place]: Independent with ADLs and functional transfers, Independent with homemaking with ambulation  Receives Help From: Family (Siblings live down the street)  ADL Assistance: Independent  Homemaking Assistance: Independent  Ambulatory Assistance: Independent  Leisure: Bowling  Hand Dominance: Right  Prior Function Comments: +drives  Precautions:  Precautions  Hearing/Visual Limitations: +glasses  Medical Precautions: Fall precautions  Precautions Comment: Pt denies any falls in the last 6 mo         Objective   Pain:  Pain Assessment  Pain Assessment: 0-10  0-10 (Numeric) Pain Score: 0 - No pain  Cognition:  Cognition  Overall Cognitive Status: Within Functional Limits  Orientation Level: Oriented X4    General Assessments:     Sensation  Light Touch: No apparent deficits  Sensation Comment: Pt denies N/T    Strength  Strength Comments: BLE grossly 4/5 based on functional mobility  Static Sitting Balance  Static Sitting-Level of Assistance: Close supervision  Dynamic Sitting Balance  Dynamic Sitting-Level of Assistance: Close supervision    Static Standing Balance  Static Standing-Level of Assistance:  (SBA with no device)  Dynamic Standing Balance  Dynamic Standing-Level of Assistance:  (SBA with no device)  Functional Assessments:  Bed Mobility  Bed Mobility: Yes  Bed Mobility 1  Bed Mobility 1: Supine to sitting, Sitting to supine  Level of Assistance 1: Minimal  verbal cues (SBA)  Bed Mobility Comments 1: Pt supine to sit and sit to supine with SBA. Minimal verbal cues for safety with movement. HOB elevated slightly.    Transfers  Transfer: Yes  Transfer 1  Transfer From 1: Sit to, Stand to  Transfer to 1: Stand, Sit  Technique 1: Sit to stand, Stand to sit  Transfer Level of Assistance 1:  (SBA)  Trials/Comments 1: 2x in session from EOB. Pt supine to sit and sit to supine with SBA and no AD.    Ambulation/Gait Training  Ambulation/Gait Training Performed: Yes  Ambulation/Gait Training 1  Surface 1: Level tile  Device 1: No device  Assistance 1:  (SBA)  Quality of Gait 1: Narrow base of support, Decreased step length, Forward flexed posture, Soft knee(s)  Comments/Distance (ft) 1: Pt ambulated 20 ft with no AD and SBA.  Ambulation/Gait Training 2  Surface 2: Level tile  Device 2: No device  Assistance 2:  (SBA)  Quality of Gait 2: Narrow base of support, Decreased step length, Forward flexed posture, Soft knee(s)  Comments/Distance (ft) 2: Pt ambulated 2x40 ft with SBA and no AD.    Stairs  Stairs: No  Extremity/Trunk Assessments:  RLE   RLE : Within Functional Limits  LLE   LLE : Within Functional Limits  Treatments:     Bed Mobility  Bed Mobility: Yes  Bed Mobility 1  Bed Mobility 1: Supine to sitting, Sitting to supine  Level of Assistance 1: Minimal verbal cues (SBA)  Bed Mobility Comments 1: Pt supine to sit and sit to supine with SBA. Minimal verbal cues for safety with movement. HOB elevated slightly.    Ambulation/Gait Training  Ambulation/Gait Training Performed: Yes  Ambulation/Gait Training 1  Surface 1: Level tile  Device 1: No device  Assistance 1:  (SBA)  Quality of Gait 1: Narrow base of support, Decreased step length, Forward flexed posture, Soft knee(s)  Comments/Distance (ft) 1: Pt ambulated 20 ft with no AD and SBA.  Ambulation/Gait Training 2  Surface 2: Level tile  Device 2: No device  Assistance 2:  (SBA)  Quality of Gait 2: Narrow base of support,  Decreased step length, Forward flexed posture, Soft knee(s)  Comments/Distance (ft) 2: Pt ambulated 2x40 ft with SBA and no AD.  Transfers  Transfer: Yes  Transfer 1  Transfer From 1: Sit to, Stand to  Transfer to 1: Stand, Sit  Technique 1: Sit to stand, Stand to sit  Transfer Level of Assistance 1:  (SBA)  Trials/Comments 1: 2x in session from EOB. Pt supine to sit and sit to supine with SBA and no AD.    Stairs  Stairs: No  Outcome Measures:  [facility] Basic Mobility  Turning from your back to your side while in a flat bed without using bedrails: A little  Moving from lying on your back to sitting on the side of a flat bed without using bedrails: A little  Moving to and from bed to chair (including a wheelchair): A little  Standing up from a chair using your arms (e.g. wheelchair or bedside chair): A little  To walk in hospital room: A little  Climbing 3-5 steps with railing: A lot  Basic Mobility - Total Score: 17    Encounter Problems       Encounter Problems (Active)       Mobility       Patient will ascend/ descend 8 steps [place] with 2 handrails (Progressing)       Start:  09/26/24    Expected End:  10/10/24            Patient will ambulate 200 ft independently  (Progressing)       Start:  09/26/24    Expected End:  10/10/24               PT Transfers       Patient will be independent with sit to stand and bed to chair transfers (Progressing)       Start:  09/26/24    Expected End:  10/10/24            Patient will be independent with bed mobility (Progressing)       Start:  09/26/24    Expected End:  10/10/24                   Education Documentation  Precautions, taught by CHICHI Leos at 9/26/2024  3:41 PM.  Learner: Patient  Readiness: Acceptance  Method: Explanation  Response: Verbalizes Understanding, Needs Reinforcement    Mobility Training, taught by CHICHI Leos at 9/26/2024  3:41 PM.  Learner: Patient  Readiness: Acceptance  Method: Explanation  Response: Verbalizes Understanding, Needs  Reinforcement    Education Comments  No comments found.

## 2024-09-26 NOTE — PROGRESS NOTES
"MEDICINE INPATIENT PROGRESS NOTE  Indu Red is a 73 y.o. female on day 0 of admission presenting with Ileus (Multi)    Subjective   Patient seen and examined in the ED this morning after being admitted overnight. She has not had a bowel movement yet, she is passing gas. She states her last bowel movement was about a week ago. Denies fever, chills, chest pain, dyspnea, vomiting, leg swelling, and dysuria.          Objective   Current Vitals  /63 (BP Location: Right arm, Patient Position: Lying)   Pulse 52   Temp 36.4 °C (97.5 °F) (Oral)   Resp 18   Ht 1.575 m (5' 2\")   Wt 54.1 kg (119 lb 3.2 oz)   SpO2 98%   BMI 21.80 kg/m²      No intake/output data recorded.      Physical exam:  Constitutional: Patient does not appear to be in any acute distress, AOx4  HEENT: NCAT, normal external inspection of ears and nose. Oropharynx normal.  Cardio: RRR, S1/S2, no murmurs, rubs, or gallops, radial pulses +2, no edema of extremities  Pulm: CTAB, no respiratory distress.  GI: +BS, soft, tender to deep palpation in right and left lower quadrant, distended, no guarding or rebound, no masses noted  MSK: No joint swelling, normal movements of all extremities. Normal ROM, 5/5 strength  Skin: No lesions, contusions, or erythema.  Extremities: no BLE swelling   Neuro: No focal deficits  Psych: Appropriate mood and behavior    Relevant Results  Labs:  CBC: WBC 10.1 , HGB 11.5,   BMP: , K 3.3, Cl 104, HCO3 23, BUN 11, CR 0.94, Glu 58  LFTS: AST 10 , ALT 5, ALKPHOS 76 , TBILI 0.5 , DBILI No results found for requested labs within last 365 days.  TROP: No results found for requested labs within last 365 days.  BNP: No results found for requested labs within last 365 days.  COAGS: PT No results found for requested labs within last 365 days. , PTT No results found for requested labs within last 365 days.  , INR No results found for requested labs within last 365 days.  UA:   Results from last 7 days   Lab Units " 09/25/24  1841   COLOR U  Yellow   PH U  6.0   SPEC GRAV UR  1.014   PROTEIN U mg/dL 10 (TRACE)   BLOOD UR  0.1 (1+)*   NITRITE U  NEGATIVE   WBC UR /HPF 1-5     ABG:    CALCIUM 8.9 MAG No results found for requested labs within last 365 days. ALB 3.7 LACTATE No results found for requested labs within last 365 days. PHOS No results found for requested labs within last 365 days. COVIDNo results found for requested labs within last 365 days.    Micro/culture data:  No results found for the last 120 days.      Imaging:  ECG 12 lead  Sinus bradycardia  Left axis deviation  Possible Anterolateral infarct , age undetermined  Abnormal ECG  When compared with ECG of 04-JUN-2024 14:25,  QRS axis Shifted left  Borderline criteria for Anterior infarct are now Present  Borderline criteria for Anterolateral infarct are now Present  T wave inversion no longer evident in Inferior leads  Nonspecific T wave abnormality, worse in Lateral leads  See ED provider note for full interpretation and clinical correlation  Confirmed by Marv Almanza ([id number]) on 9/26/2024 4:02:36 AM         MEDS:  Scheduled medications  amLODIPine, 5 mg, oral, Daily  aspirin, 81 mg, oral, q24h  enoxaparin, 40 mg, subcutaneous, q24h  gabapentin, 100 mg, oral, q AM  levothyroxine, 50 mcg, oral, Daily  lidocaine, 1 patch, transdermal, Daily  lisinopril, 5 mg, oral, Daily  metoprolol succinate XL, 25 mg, oral, Daily  nicotine, 1 patch, transdermal, Daily  pantoprazole, 40 mg, oral, Daily before breakfast  polyethylene glycol, 17 g, oral, Daily  potassium chloride CR, 10 mEq, oral, Daily  rosuvastatin, 20 mg, oral, Daily  sennosides, 2 tablet, oral, BID  venlafaxine, 25 mg, oral, BID      Continuous medications     PRN medications  PRN medications: albuterol, diclofenac sodium, ondansetron, tiZANidine       Assessment/Plan   Assessment/Plan:   Ms. Indu Red is a 74 yo female with PMHx of HTN, HLD, MS, OA, GERD, hypothyroidism, who presented with worsening  obstipation, nausea, vomiting and abdominal pain. CT AP revealed prominent bowel loops suggestive of ileus or enterocolitis. Patient started on aggressive bowel regimen and plan for enema once in private setting followed by Maryam.     #Abdominal pain  #Nausea/vomiting  #Constipation  ::Abdominal pain likely combination of possible ileus, constipation, potentially some component of known diverticulosis.   ::CT AP with prominent but not dilated bowel loops suggestive of ileus, enterocolitis  -Bowel regimen: miralax daily, senna BID, lactulose 30mg x1  -Will proceed with tap water enema once patient in private space, then will administer Golytely.   -IV zofran 4mg q6h PRN  -NPO for bowel rest     #Generalized weakness  #Lower back pain  -PT/OT consulted  -continue voltaren PRN, venlafaxine 25mg, gabapentin 100mg  -hold home tramadol to prevent worsening constipation     #Leukocytosis  ::Likely reactive to dehydration  ::Lactate wnl  -s/p 1L LR  -CTM     #Unintentional weight loss  ::10-20lb in the past 3-4 months with subjective fevers/chills  ::TSH 7.44, free thyroxine 1.22  -Will need repeat colonoscopy as an outpatient     #MS   -Teriflunomide 14mg not on formulary, will ask pt to bring in     Chronic problems:  #DLD   - continue rosuvastatin 20mg    #GERD   - continue pantoprazole 40mg    #HTN  - continue lisinopril 5mg, amlodipine 5mg    #Hypothyroidism  - continue synthroid 50mcg    #Nicotine use  - nicotine 14mg patch      F: s/p 1L in ED  E: replete PRN  N: NPO for bowel rest  A: PIV  DVT ppx: lovenox    Code Status: Full code  Surrogate Medical Decision-maker: Rashida Allan (daughter) [phone]       Linn Hanson MD  Internal Medicine PGY-1

## 2024-09-27 LAB
ALBUMIN SERPL BCP-MCNC: 3.5 G/DL (ref 3.4–5)
ANION GAP SERPL CALC-SCNC: 16 MMOL/L (ref 10–20)
BUN SERPL-MCNC: 12 MG/DL (ref 6–23)
CALCIUM SERPL-MCNC: 8.8 MG/DL (ref 8.6–10.6)
CHLORIDE SERPL-SCNC: 104 MMOL/L (ref 98–107)
CO2 SERPL-SCNC: 21 MMOL/L (ref 21–32)
CREAT SERPL-MCNC: 0.96 MG/DL (ref 0.5–1.05)
EGFRCR SERPLBLD CKD-EPI 2021: 63 ML/MIN/1.73M*2
ERYTHROCYTE [DISTWIDTH] IN BLOOD BY AUTOMATED COUNT: 14.1 % (ref 11.5–14.5)
GLUCOSE SERPL-MCNC: 64 MG/DL (ref 74–99)
HCT VFR BLD AUTO: 32.5 % (ref 36–46)
HGB BLD-MCNC: 10.7 G/DL (ref 12–16)
MAGNESIUM SERPL-MCNC: 1.88 MG/DL (ref 1.6–2.4)
MCH RBC QN AUTO: 27 PG (ref 26–34)
MCHC RBC AUTO-ENTMCNC: 32.9 G/DL (ref 32–36)
MCV RBC AUTO: 82 FL (ref 80–100)
NRBC BLD-RTO: 0 /100 WBCS (ref 0–0)
PHOSPHATE SERPL-MCNC: 3.6 MG/DL (ref 2.5–4.9)
PLATELET # BLD AUTO: 382 X10*3/UL (ref 150–450)
POTASSIUM SERPL-SCNC: 4.2 MMOL/L (ref 3.5–5.3)
RBC # BLD AUTO: 3.96 X10*6/UL (ref 4–5.2)
SODIUM SERPL-SCNC: 137 MMOL/L (ref 136–145)
WBC # BLD AUTO: 7.1 X10*3/UL (ref 4.4–11.3)

## 2024-09-27 PROCEDURE — 2500000001 HC RX 250 WO HCPCS SELF ADMINISTERED DRUGS (ALT 637 FOR MEDICARE OP)

## 2024-09-27 PROCEDURE — 2500000002 HC RX 250 W HCPCS SELF ADMINISTERED DRUGS (ALT 637 FOR MEDICARE OP, ALT 636 FOR OP/ED)

## 2024-09-27 PROCEDURE — 36415 COLL VENOUS BLD VENIPUNCTURE: CPT

## 2024-09-27 PROCEDURE — 1210000001 HC SEMI-PRIVATE ROOM DAILY

## 2024-09-27 PROCEDURE — S4991 NICOTINE PATCH NONLEGEND: HCPCS

## 2024-09-27 PROCEDURE — 99232 SBSQ HOSP IP/OBS MODERATE 35: CPT

## 2024-09-27 PROCEDURE — 85027 COMPLETE CBC AUTOMATED: CPT

## 2024-09-27 PROCEDURE — 80069 RENAL FUNCTION PANEL: CPT

## 2024-09-27 PROCEDURE — 83735 ASSAY OF MAGNESIUM: CPT

## 2024-09-27 PROCEDURE — 2500000004 HC RX 250 GENERAL PHARMACY W/ HCPCS (ALT 636 FOR OP/ED)

## 2024-09-27 RX ORDER — BISACODYL 5 MG
5 TABLET, DELAYED RELEASE (ENTERIC COATED) ORAL 2 TIMES DAILY
Status: DISCONTINUED | OUTPATIENT
Start: 2024-09-27 | End: 2024-09-28 | Stop reason: HOSPADM

## 2024-09-27 RX ORDER — POLYETHYLENE GLYCOL 3350 17 G/17G
238 POWDER, FOR SOLUTION ORAL ONCE AS NEEDED
Status: COMPLETED | OUTPATIENT
Start: 2024-09-27 | End: 2024-09-27

## 2024-09-27 RX ORDER — BISACODYL 10 MG/1
10 SUPPOSITORY RECTAL DAILY
Status: DISCONTINUED | OUTPATIENT
Start: 2024-09-27 | End: 2024-09-28 | Stop reason: HOSPADM

## 2024-09-27 SDOH — SOCIAL STABILITY: SOCIAL INSECURITY: DO YOU FEEL ANYONE HAS EXPLOITED OR TAKEN ADVANTAGE OF YOU FINANCIALLY OR OF YOUR PERSONAL PROPERTY?: NO

## 2024-09-27 SDOH — ECONOMIC STABILITY: FOOD INSECURITY: WITHIN THE PAST 12 MONTHS, YOU WORRIED THAT YOUR FOOD WOULD RUN OUT BEFORE YOU GOT MONEY TO BUY MORE.: NEVER TRUE

## 2024-09-27 SDOH — SOCIAL STABILITY: SOCIAL INSECURITY: ABUSE: ADULT

## 2024-09-27 SDOH — ECONOMIC STABILITY: INCOME INSECURITY: IN THE PAST 12 MONTHS, HAS THE ELECTRIC, GAS, OIL, OR WATER COMPANY THREATENED TO SHUT OFF SERVICE IN YOUR HOME?: NO

## 2024-09-27 SDOH — SOCIAL STABILITY: SOCIAL INSECURITY: WITHIN THE LAST YEAR, HAVE YOU BEEN AFRAID OF YOUR PARTNER OR EX-PARTNER?: NO

## 2024-09-27 SDOH — SOCIAL STABILITY: SOCIAL INSECURITY: HAS ANYONE EVER THREATENED TO HURT YOUR FAMILY OR YOUR PETS?: NO

## 2024-09-27 SDOH — SOCIAL STABILITY: SOCIAL INSECURITY: DO YOU FEEL UNSAFE GOING BACK TO THE PLACE WHERE YOU ARE LIVING?: NO

## 2024-09-27 SDOH — SOCIAL STABILITY: SOCIAL INSECURITY: ARE THERE ANY APPARENT SIGNS OF INJURIES/BEHAVIORS THAT COULD BE RELATED TO ABUSE/NEGLECT?: NO

## 2024-09-27 SDOH — SOCIAL STABILITY: SOCIAL INSECURITY: HAVE YOU HAD THOUGHTS OF HARMING ANYONE ELSE?: NO

## 2024-09-27 SDOH — SOCIAL STABILITY: SOCIAL INSECURITY: DOES ANYONE TRY TO KEEP YOU FROM HAVING/CONTACTING OTHER FRIENDS OR DOING THINGS OUTSIDE YOUR HOME?: NO

## 2024-09-27 SDOH — ECONOMIC STABILITY: FOOD INSECURITY: WITHIN THE PAST 12 MONTHS, THE FOOD YOU BOUGHT JUST DIDN'T LAST AND YOU DIDN'T HAVE MONEY TO GET MORE.: NEVER TRUE

## 2024-09-27 SDOH — SOCIAL STABILITY: SOCIAL INSECURITY: ARE YOU OR HAVE YOU BEEN THREATENED OR ABUSED PHYSICALLY, EMOTIONALLY, OR SEXUALLY BY ANYONE?: NO

## 2024-09-27 SDOH — SOCIAL STABILITY: SOCIAL INSECURITY: WITHIN THE LAST YEAR, HAVE YOU BEEN HUMILIATED OR EMOTIONALLY ABUSED IN OTHER WAYS BY YOUR PARTNER OR EX-PARTNER?: NO

## 2024-09-27 SDOH — SOCIAL STABILITY: SOCIAL INSECURITY: WERE YOU ABLE TO COMPLETE ALL THE BEHAVIORAL HEALTH SCREENINGS?: YES

## 2024-09-27 ASSESSMENT — ACTIVITIES OF DAILY LIVING (ADL)
LACK_OF_TRANSPORTATION: NO
PATIENT'S MEMORY ADEQUATE TO SAFELY COMPLETE DAILY ACTIVITIES?: YES
ADEQUATE_TO_COMPLETE_ADL: YES
FEEDING YOURSELF: INDEPENDENT
DRESSING YOURSELF: INDEPENDENT
BATHING: INDEPENDENT
WALKS IN HOME: NEEDS ASSISTANCE
HEARING - LEFT EAR: FUNCTIONAL
ASSISTIVE_DEVICE: CANE;WALKER
TOILETING: INDEPENDENT
HEARING - RIGHT EAR: FUNCTIONAL
JUDGMENT_ADEQUATE_SAFELY_COMPLETE_DAILY_ACTIVITIES: YES
GROOMING: INDEPENDENT

## 2024-09-27 ASSESSMENT — COGNITIVE AND FUNCTIONAL STATUS - GENERAL
WALKING IN HOSPITAL ROOM: A LITTLE
CLIMB 3 TO 5 STEPS WITH RAILING: A LITTLE
PATIENT BASELINE BEDBOUND: NO
TOILETING: A LITTLE
WALKING IN HOSPITAL ROOM: A LITTLE
DRESSING REGULAR LOWER BODY CLOTHING: A LITTLE
DAILY ACTIVITIY SCORE: 21
CLIMB 3 TO 5 STEPS WITH RAILING: A LITTLE
DAILY ACTIVITIY SCORE: 21
HELP NEEDED FOR BATHING: A LITTLE
MOBILITY SCORE: 21
MOBILITY SCORE: 21
HELP NEEDED FOR BATHING: A LITTLE
STANDING UP FROM CHAIR USING ARMS: A LITTLE
DRESSING REGULAR LOWER BODY CLOTHING: A LITTLE
TOILETING: A LITTLE
STANDING UP FROM CHAIR USING ARMS: A LITTLE

## 2024-09-27 ASSESSMENT — PAIN SCALES - GENERAL
PAINLEVEL_OUTOF10: 4
PAINLEVEL_OUTOF10: 0 - NO PAIN

## 2024-09-27 ASSESSMENT — LIFESTYLE VARIABLES
SKIP TO QUESTIONS 9-10: 1
AUDIT-C TOTAL SCORE: 1
AUDIT-C TOTAL SCORE: 1
HOW MANY STANDARD DRINKS CONTAINING ALCOHOL DO YOU HAVE ON A TYPICAL DAY: 1 OR 2
HOW OFTEN DO YOU HAVE A DRINK CONTAINING ALCOHOL: MONTHLY OR LESS
HOW OFTEN DO YOU HAVE 6 OR MORE DRINKS ON ONE OCCASION: NEVER

## 2024-09-27 ASSESSMENT — PAIN - FUNCTIONAL ASSESSMENT: PAIN_FUNCTIONAL_ASSESSMENT: 0-10

## 2024-09-27 ASSESSMENT — PAIN SCALES - WONG BAKER: WONGBAKER_NUMERICALRESPONSE: NO HURT

## 2024-09-27 NOTE — PROGRESS NOTES
"MEDICINE INPATIENT PROGRESS NOTE  Indu Red is a 73 y.o. female on day 1 of admission presenting with Ileus (Multi)    Subjective   Patient seen and examined in the ED this morning. She had one hard small bowel movement yesterday afternoon, watery bowel movement after tap water enema last evening. She continues to endorse abdominal pain across lower abdomen. Denies fever, chills, chest pain, dyspnea, vomiting, leg swelling, and dysuria.          Objective   Current Vitals  /85   Pulse 52   Temp 36.4 °C (97.5 °F) (Oral)   Resp 18   Ht 1.575 m (5' 2\")   Wt 54.1 kg (119 lb 3.2 oz)   SpO2 97%   BMI 21.80 kg/m²      No intake/output data recorded.      Physical exam:  Constitutional: Patient does not appear to be in any acute distress, AOx4  HEENT: NCAT, normal external inspection of ears and nose. Oropharynx normal.  Cardio: RRR, S1/S2, no murmurs, rubs, or gallops, radial pulses +2, no edema of extremities  Pulm: CTAB, no respiratory distress.  GI: +BS, soft, tender to deep palpation in right and left lower quadrant, distended, no guarding or rebound, no masses noted  MSK: No joint swelling, normal movements of all extremities. Normal ROM, 5/5 strength  Skin: No lesions, contusions, or erythema.  Extremities: no BLE swelling   Neuro: No focal deficits  Psych: Appropriate mood and behavior    Relevant Results  Labs:  CBC: WBC 7.1 , HGB 10.7,   BMP: , K 4.2, Cl 104, HCO3 21, BUN 12, CR 0.96, Glu 64  LFTS: AST 10 , ALT 5, ALKPHOS 76 , TBILI 0.5 , DBILI No results found for requested labs within last 365 days.  TROP: No results found for requested labs within last 365 days.  BNP: No results found for requested labs within last 365 days.  COAGS: PT No results found for requested labs within last 365 days. , PTT No results found for requested labs within last 365 days.  , INR No results found for requested labs within last 365 days.  UA:   Results from last 7 days   Lab Units 09/25/24  4191 "   COLOR U  Yellow   PH U  6.0   SPEC GRAV UR  1.014   PROTEIN U mg/dL 10 (TRACE)   BLOOD UR  0.1 (1+)*   NITRITE U  NEGATIVE   WBC UR /HPF 1-5     ABG:    CALCIUM 8.8 MAG No results found for requested labs within last 365 days. ALB 3.5 LACTATE No results found for requested labs within last 365 days. PHOS No results found for requested labs within last 365 days. COVIDNo results found for requested labs within last 365 days.    Micro/culture data:  No results found for the last 120 days.      Imaging:  ECG 12 lead  Sinus bradycardia  Left axis deviation  Possible Anterolateral infarct , age undetermined  Abnormal ECG  When compared with ECG of 04-JUN-2024 14:25,  QRS axis Shifted left  Borderline criteria for Anterior infarct are now Present  Borderline criteria for Anterolateral infarct are now Present  T wave inversion no longer evident in Inferior leads  Nonspecific T wave abnormality, worse in Lateral leads  See ED provider note for full interpretation and clinical correlation  Confirmed by Marv Almanza ([id number]) on 9/26/2024 4:02:36 AM         MEDS:  Scheduled medications  amLODIPine, 5 mg, oral, Daily  aspirin, 81 mg, oral, q24h  enoxaparin, 40 mg, subcutaneous, q24h  gabapentin, 100 mg, oral, q AM  levothyroxine, 50 mcg, oral, Daily  lidocaine, 1 patch, transdermal, Daily  lisinopril, 5 mg, oral, Daily  metoprolol succinate XL, 25 mg, oral, Daily  nicotine, 1 patch, transdermal, Daily  pantoprazole, 40 mg, oral, Daily before breakfast  polyethylene glycol, 17 g, oral, Daily  potassium chloride CR, 10 mEq, oral, Daily  rosuvastatin, 20 mg, oral, Daily  sennosides, 2 tablet, oral, BID  venlafaxine, 25 mg, oral, BID      Continuous medications     PRN medications  PRN medications: albuterol, diclofenac sodium, ondansetron, polyethylene glycol-electrolytes, tiZANidine       Assessment/Plan   Assessment/Plan:   Ms. Indu Red is a 74 yo female with PMHx of HTN, HLD, MS, OA, GERD, hypothyroidism, who presented with  worsening obstipation, nausea, vomiting and abdominal pain. CT AP revealed prominent bowel loops suggestive of ileus or enterocolitis. Patient started on aggressive bowel regimen and enema. Once in private setting, will administer Golytely.     Updates 9/27/24  -Patient moved to [place] bed and started on Golytely   -Encouraged ambulation to help with bowel movements   -Had one hard small bowel movement yesterday afternoon, watery bowel movement after tap water enema last evening    #Abdominal pain  #Nausea/vomiting  #Constipation  ::Abdominal pain likely combination of possible ileus, constipation, potentially some component of known diverticulosis.   ::CT AP with prominent but not dilated bowel loops suggestive of ileus, enterocolitis  -Bowel regimen: miralax daily, senna BID  -Patient received tap water enemas x2  -Will administer Golytely  -IV zofran 4mg q6h PRN  -Clear liquid diet     #Generalized weakness  #Lower back pain  -PT/OT consulted  -continue voltaren PRN, venlafaxine 25mg, gabapentin 100mg  -hold home tramadol to prevent worsening constipation     #Leukocytosis  ::Likely reactive to dehydration  ::Lactate wnl  -s/p 1L LR  -CTM     #Unintentional weight loss  ::10-20lb in the past 3-4 months with subjective fevers/chills  ::TSH 7.44, free thyroxine 1.22  -Will need repeat colonoscopy as an outpatient     #MS   -Teriflunomide 14mg not on formulary, will ask pt to bring in     Chronic problems:  #DLD   - continue rosuvastatin 20mg    #GERD   - continue pantoprazole 40mg    #HTN  - continue lisinopril 5mg, amlodipine 5mg    #Hypothyroidism  - continue synthroid 50mcg    #Nicotine use  - nicotine 14mg patch      F: replete PRN  E: replete PRN  N: Clear liquid  A: PIV  DVT ppx: lovenox    Code Status: Full code  Surrogate Medical Decision-maker: Rashida Allan (daughter) [phone]       Linn Hanson MD  Internal Medicine PGY-1

## 2024-09-27 NOTE — HOSPITAL COURSE
Ms. Indu Red is a 74 yo female with PMHx of HTN, HLD, MS, OA, GERD, hypothyroidism, GERD admitted for nausea and vomiting likely 2/2 to ileus and constipation.     Over the past 2-3 weeks, has had GI upset. Originally started with intermittent nausea, nonbloody, nonbilious vomiting for around 1-1.5 weeks. Felt nauseous after each meal. Had prior episodes of GI upset in the past that she tries to self-treat with cannabis, which usually helps, but did not help this time. Tried a soft diet, then liquid diet without improvement. Unable to tolerate anything for the past couple of days, including medications. Has been constipated as well during this time, unable to pass gas. Known history of diverticulosis, but has been drinking a lot of water, good fiber intake - fruits and veggies, avoiding nuts.     Of note, over the past 3-4 months, has had decreased appetite, endorses 10-20lb weight loss, fatigue, chills, night sweats. Also has noticed increasing weakness and lower back pain since her shoulder operation in 2/2024. Last colonoscopy 2019 with poor prep, but 7 polyps removed. Was due for a repeat colonoscopy in 2023, but was unable to scheduled the appt.    Patient seen and examined in the ED after being admitted overnight. She has not had a bowel movement yet, she is passing gas. She states her last bowel movement was about a week ago. Denies fever, chills, chest pain, dyspnea, vomiting, leg swelling, and dysuria.     Patient moved to floor and refused Golytely. She was then given 238mg miralax w Gatorade, dulcolax 5mg oral and dulcolax 10mg suppository. Had several bowel movements through the evening and 3 overnight into today. Patient reports she feels much improved after bowel movements. She is stable for discharge. We discussed stopping her amlodipine as this can cause constipation with close PCP follow up. She was started on miralax BID as well. We will also plan for colonoscopy as an outpatient as she has not  had one since 2019.

## 2024-09-28 VITALS
HEIGHT: 62 IN | SYSTOLIC BLOOD PRESSURE: 138 MMHG | RESPIRATION RATE: 18 BRPM | DIASTOLIC BLOOD PRESSURE: 78 MMHG | OXYGEN SATURATION: 100 % | TEMPERATURE: 97.7 F | HEART RATE: 57 BPM | BODY MASS INDEX: 21.94 KG/M2 | WEIGHT: 119.2 LBS

## 2024-09-28 LAB
ALBUMIN SERPL BCP-MCNC: 3.5 G/DL (ref 3.4–5)
ANION GAP SERPL CALC-SCNC: 13 MMOL/L (ref 10–20)
BUN SERPL-MCNC: 7 MG/DL (ref 6–23)
CALCIUM SERPL-MCNC: 8.7 MG/DL (ref 8.6–10.6)
CHLORIDE SERPL-SCNC: 106 MMOL/L (ref 98–107)
CO2 SERPL-SCNC: 24 MMOL/L (ref 21–32)
CREAT SERPL-MCNC: 1.05 MG/DL (ref 0.5–1.05)
EGFRCR SERPLBLD CKD-EPI 2021: 56 ML/MIN/1.73M*2
ERYTHROCYTE [DISTWIDTH] IN BLOOD BY AUTOMATED COUNT: 14.1 % (ref 11.5–14.5)
GLUCOSE SERPL-MCNC: 71 MG/DL (ref 74–99)
HCT VFR BLD AUTO: 33.2 % (ref 36–46)
HGB BLD-MCNC: 10.8 G/DL (ref 12–16)
MAGNESIUM SERPL-MCNC: 1.75 MG/DL (ref 1.6–2.4)
MCH RBC QN AUTO: 27.3 PG (ref 26–34)
MCHC RBC AUTO-ENTMCNC: 32.5 G/DL (ref 32–36)
MCV RBC AUTO: 84 FL (ref 80–100)
NRBC BLD-RTO: 0 /100 WBCS (ref 0–0)
PHOSPHATE SERPL-MCNC: 2.7 MG/DL (ref 2.5–4.9)
PLATELET # BLD AUTO: 422 X10*3/UL (ref 150–450)
POTASSIUM SERPL-SCNC: 3.6 MMOL/L (ref 3.5–5.3)
RBC # BLD AUTO: 3.96 X10*6/UL (ref 4–5.2)
SODIUM SERPL-SCNC: 139 MMOL/L (ref 136–145)
WBC # BLD AUTO: 6.6 X10*3/UL (ref 4.4–11.3)

## 2024-09-28 PROCEDURE — 2500000001 HC RX 250 WO HCPCS SELF ADMINISTERED DRUGS (ALT 637 FOR MEDICARE OP)

## 2024-09-28 PROCEDURE — 2500000002 HC RX 250 W HCPCS SELF ADMINISTERED DRUGS (ALT 637 FOR MEDICARE OP, ALT 636 FOR OP/ED)

## 2024-09-28 PROCEDURE — 80069 RENAL FUNCTION PANEL: CPT

## 2024-09-28 PROCEDURE — 2500000004 HC RX 250 GENERAL PHARMACY W/ HCPCS (ALT 636 FOR OP/ED)

## 2024-09-28 PROCEDURE — 85027 COMPLETE CBC AUTOMATED: CPT

## 2024-09-28 PROCEDURE — 36415 COLL VENOUS BLD VENIPUNCTURE: CPT

## 2024-09-28 PROCEDURE — S4991 NICOTINE PATCH NONLEGEND: HCPCS

## 2024-09-28 PROCEDURE — 83735 ASSAY OF MAGNESIUM: CPT

## 2024-09-28 RX ORDER — POLYETHYLENE GLYCOL 3350 17 G/17G
17 POWDER, FOR SOLUTION ORAL 2 TIMES DAILY
Qty: 1020 G | Refills: 1 | Status: SHIPPED | OUTPATIENT
Start: 2024-09-28

## 2024-09-28 ASSESSMENT — PAIN SCALES - GENERAL: PAINLEVEL_OUTOF10: 6

## 2024-09-28 ASSESSMENT — ACTIVITIES OF DAILY LIVING (ADL): LACK_OF_TRANSPORTATION: NO

## 2024-09-28 NOTE — DISCHARGE INSTRUCTIONS
Dear Indu Red,    You were admitted to [facility] from 9/25 to 9/28 for constipation. You were given a bowel regimen of miralax, dulcolax, and enemas and responded appropriately. Follow up with your primary care physician within the next 2 weeks. We encourage you to be sure to drink 64oz of water a day, along with maintaining a diet high in fiber including fruit, vegetables, grains, beans, peas and lentils. You will need to begin your miralax to help prevent you from coming back into the hospital. We are so glad you are feeling better!      It was a pleasure taking care of you,  Your  Care Team

## 2024-09-28 NOTE — CARE PLAN
Problem: Fall/Injury  Goal: Not fall by end of shift  Outcome: Progressing  Goal: Be free from injury by end of the shift  Outcome: Progressing  Goal: Verbalize understanding of personal risk factors for fall in the hospital  Outcome: Progressing  Goal: Verbalize understanding of risk factor reduction measures to prevent injury from fall in the home  Outcome: Progressing  Goal: Use assistive devices by end of the shift  Outcome: Progressing  Goal: Pace activities to prevent fatigue by end of the shift  Outcome: Progressing   The patient's goals for the shift include      The clinical goals for the shift include Pt will have normal BM during this shift

## 2024-09-28 NOTE — DISCHARGE SUMMARY
Discharge Diagnosis  Ileus (Multi)    Issues Requiring Follow-Up  Follow up with PCP   Obtain outpatient colonoscopy    Discharge Meds     Medication List      START taking these medications     polyethylene glycol 17 gram/dose powder; Commonly known as: Glycolax,   Miralax; Mix 17 grams (1 capful) with 8 ounces of liquid and take by mouth   2 times a day.     CONTINUE taking these medications     albuterol 90 mcg/actuation inhaler   aspirin 81 mg EC tablet   diclofenac sodium 1 % gel; Commonly known as: Voltaren; Apply 4.5 inches   (4 g) topically 2 times a day as needed (pain).   * gabapentin 300 mg capsule; Commonly known as: Neurontin   * gabapentin 100 mg capsule; Commonly known as: Neurontin; Take 1   capsule (100 mg) by mouth once daily in the morning.   ipratropium 42 mcg (0.06 %) nasal spray; Commonly known as: Atrovent   levothyroxine 50 mcg tablet; Commonly known as: Synthroid, Levoxyl; Take   1 tablet (50 mcg) by mouth once daily.   lisinopril 5 mg tablet; Take 1 tablet (5 mg) by mouth once daily.   metoprolol succinate XL 25 mg 24 hr tablet; Commonly known as:   Toprol-XL; TAKE 1 TABLET BY MOUTH EVERY DAY FOR 90 DAYS   ondansetron ODT 4 mg disintegrating tablet; Commonly known as:   Zofran-ODT; Take 1 tablet (4 mg) by mouth every 8 hours if needed for   nausea or vomiting.   pantoprazole 40 mg EC tablet; Commonly known as: ProtoNix; Take 1 tablet   (40 mg) by mouth once daily in the morning. Take before meals.   potassium chloride CR 10 mEq ER tablet; Commonly known as: Klor-Con;   Take 1 tablet (10 mEq) by mouth once daily. DO NOT CRUSH CHEW OR SPLIT   rosuvastatin 20 mg tablet; Commonly known as: Crestor; Take 1 tablet (20   mg) by mouth once daily.   tiZANidine 4 mg tablet; Commonly known as: Zanaflex; TAKE 1 TABLET BY   MOUTH THREE TIMES A DAY AS NEEDED   venlafaxine 25 mg tablet; Commonly known as: Effexor; TAKE 1 TABLET BY   MOUTH TWICE A DAY   Vitamin D3 25 MCG (1000 UT) tablet; Generic drug:  cholecalciferol  * This list has 2 medication(s) that are the same as other medications   prescribed for you. Read the directions carefully, and ask your doctor or   other care provider to review them with you.     STOP taking these medications     amLODIPine 5 mg tablet; Commonly known as: Norvasc   nicotine 14 mg/24 hr patch; Commonly known as: Nicoderm CQ   traMADol 50 mg tablet; Commonly known as: Ultram       Test Results Pending At Discharge  Pending Labs       No current pending labs.            Hospital Course  Ms. Indu Red is a 74 yo female with PMHx of HTN, HLD, MS, OA, GERD, hypothyroidism, GERD admitted for nausea and vomiting likely 2/2 to ileus and constipation.     Over the past 2-3 weeks, has had GI upset. Originally started with intermittent nausea, nonbloody, nonbilious vomiting for around 1-1.5 weeks. Felt nauseous after each meal. Had prior episodes of GI upset in the past that she tries to self-treat with cannabis, which usually helps, but did not help this time. Tried a soft diet, then liquid diet without improvement. Unable to tolerate anything for the past couple of days, including medications. Has been constipated as well during this time, unable to pass gas. Known history of diverticulosis, but has been drinking a lot of water, good fiber intake - fruits and veggies, avoiding nuts.     Of note, over the past 3-4 months, has had decreased appetite, endorses 10-20lb weight loss, fatigue, chills, night sweats. Also has noticed increasing weakness and lower back pain since her shoulder operation in 2/2024. Last colonoscopy 2019 with poor prep, but 7 polyps removed. Was due for a repeat colonoscopy in 2023, but was unable to scheduled the appt.    Patient seen and examined in the ED after being admitted overnight. She has not had a bowel movement yet, she is passing gas. She states her last bowel movement was about a week ago. Denies fever, chills, chest pain, dyspnea, vomiting, leg swelling,  and dysuria.     Patient moved to floor and refused Golytely. She was then given 238mg miralax w Gatorade, dulcolax 5mg oral and dulcolax 10mg suppository. Had several bowel movements through the evening and 3 overnight into today. Patient reports she feels much improved after bowel movements. She is stable for discharge. We discussed stopping her amlodipine as this can cause constipation with close PCP follow up. She was started on miralax BID as well. We will also plan for colonoscopy as an outpatient as she has not had one since 2019.     Physical exam at the time of discharge    Constitutional: Patient does not appear to be in any acute distress, AOx4  HEENT: NCAT, normal external inspection of ears and nose. Oropharynx normal.  Cardio: RRR, S1/S2, no murmurs, rubs, or gallops, radial pulses +2, no edema of extremities  Pulm: CTAB, no respiratory distress.  GI: +BS, soft, nontender, non-distended, no guarding or rebound, no masses noted  MSK: No joint swelling, normal movements of all extremities. Normal ROM, 5/5 strength  Skin: No lesions, contusions, or erythema.  Extremities: no BLE swelling   Neuro: No focal deficits  Psych: Appropriate mood and behavior    Outpatient Follow-Up  Future Appointments   Date Time Provider Department Center   10/8/2024  1:45 PM Otto Momin MD [id number] Scar Hanson MD  Internal Medicine PGY-1

## 2024-09-28 NOTE — PROGRESS NOTES
09/28/24 1449   Discharge Planning   Living Arrangements Alone   Support Systems Family members   Assistance Needed none   Type of Residence Private residence   Do you have animals or pets at home? No   Home or Post Acute Services None   Expected Discharge Disposition Home   Does the patient need discharge transport arranged? No   Financial Resource Strain   How hard is it for you to pay for the very basics like food, housing, medical care, and heating? Not hard   Housing Stability   In the last 12 months, was there a time when you were not able to pay the mortgage or rent on time? N   In the past 12 months, how many times have you moved where you were living? 0   At any time in the past 12 months, were you homeless or living in a shelter (including now)? N   Transportation Needs   In the past 12 months, has lack of transportation kept you from medical appointments or from getting medications? no   In the past 12 months, has lack of transportation kept you from meetings, work, or from getting things needed for daily living? No   Patient Choice   Provider Choice list and CMS website (https://medicare.gov/care-compare#search) for post-acute Quality and Resource Measure Data were provided and reviewed with: Patient   Patient / Family choosing to utilize agency / facility established prior to hospitalization Yes        Met with patient to discuss discharge planning s/p admission.  Patient lives home with alone.  She is independent in all ADL's. Requires zero assist devices for ambulation.  Patient does not have active home care or home care needs.  Demographics and contact information confirmed.  Patient will discharge home today with no needs.      EMILY Sellers, KENDRA, RN, TCC.

## 2024-09-30 ENCOUNTER — DOCUMENTATION (OUTPATIENT)
Dept: PRIMARY CARE | Facility: CLINIC | Age: 74
End: 2024-09-30
Payer: COMMERCIAL

## 2024-09-30 ENCOUNTER — PATIENT OUTREACH (OUTPATIENT)
Dept: PRIMARY CARE | Facility: CLINIC | Age: 74
End: 2024-09-30
Payer: COMMERCIAL

## 2024-09-30 NOTE — PROGRESS NOTES
Discharge Facility: [facility]  Discharge Diagnosis: Ileus  Admission Date: 09/27/2024  Discharge Date: 9/28/2024    PCP Appointment Date: 10/08/2024  Specialist Appointment Date: Podiatry 10/04/2024  Hospital Encounter and Summary Linked: Yes/      Two attempts were made to reach patient within two business days after discharge. Voicemail left with contact information for patient to call back with any non-emergent questions or concerns.

## 2024-10-07 DIAGNOSIS — G45.9 TIA (TRANSIENT ISCHEMIC ATTACK): Primary | ICD-10-CM

## 2024-10-08 ENCOUNTER — OFFICE VISIT (OUTPATIENT)
Dept: PRIMARY CARE | Facility: CLINIC | Age: 74
End: 2024-10-08
Payer: COMMERCIAL

## 2024-10-08 VITALS
WEIGHT: 127 LBS | OXYGEN SATURATION: 97 % | DIASTOLIC BLOOD PRESSURE: 70 MMHG | BODY MASS INDEX: 23.37 KG/M2 | HEIGHT: 62 IN | SYSTOLIC BLOOD PRESSURE: 114 MMHG | TEMPERATURE: 96 F | HEART RATE: 52 BPM

## 2024-10-08 DIAGNOSIS — Z09 HOSPITAL DISCHARGE FOLLOW-UP: Primary | ICD-10-CM

## 2024-10-08 DIAGNOSIS — G89.29 CHRONIC BILATERAL LOW BACK PAIN WITH BILATERAL SCIATICA: ICD-10-CM

## 2024-10-08 DIAGNOSIS — K21.9 GASTROESOPHAGEAL REFLUX DISEASE, UNSPECIFIED WHETHER ESOPHAGITIS PRESENT: ICD-10-CM

## 2024-10-08 DIAGNOSIS — R10.9 ABDOMINAL PAIN, UNSPECIFIED ABDOMINAL LOCATION: ICD-10-CM

## 2024-10-08 DIAGNOSIS — E03.9 HYPOTHYROIDISM, UNSPECIFIED TYPE: ICD-10-CM

## 2024-10-08 DIAGNOSIS — M54.41 CHRONIC BILATERAL LOW BACK PAIN WITH BILATERAL SCIATICA: ICD-10-CM

## 2024-10-08 DIAGNOSIS — M54.42 CHRONIC BILATERAL LOW BACK PAIN WITH BILATERAL SCIATICA: ICD-10-CM

## 2024-10-08 DIAGNOSIS — Z87.891 PERSONAL HISTORY OF TOBACCO USE: ICD-10-CM

## 2024-10-08 DIAGNOSIS — E89.0 POSTOPERATIVE HYPOTHYROIDISM: ICD-10-CM

## 2024-10-08 PROBLEM — R55 SYNCOPE AND COLLAPSE: Status: RESOLVED | Noted: 2024-05-19 | Resolved: 2024-10-08

## 2024-10-08 PROBLEM — S90.862A INSECT BITE OF LEFT FOOT: Status: RESOLVED | Noted: 2023-08-25 | Resolved: 2024-10-08

## 2024-10-08 PROBLEM — F17.200 NEEDS SMOKING CESSATION EDUCATION: Status: RESOLVED | Noted: 2023-08-25 | Resolved: 2024-10-08
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
## 2024-10-08 PROBLEM — R25.2 MUSCLE CRAMPS: Status: RESOLVED | Noted: 2023-08-25 | Resolved: 2024-10-08

## 2024-10-08 PROBLEM — R21 RASH: Status: RESOLVED | Noted: 2023-08-25 | Resolved: 2024-10-08

## 2024-10-08 PROBLEM — L29.9 ITCHING: Status: RESOLVED | Noted: 2023-08-25 | Resolved: 2024-10-08

## 2024-10-08 PROCEDURE — 1159F MED LIST DOCD IN RCRD: CPT | Performed by: FAMILY MEDICINE

## 2024-10-08 PROCEDURE — 99495 TRANSJ CARE MGMT MOD F2F 14D: CPT | Performed by: FAMILY MEDICINE

## 2024-10-08 PROCEDURE — 3008F BODY MASS INDEX DOCD: CPT | Performed by: FAMILY MEDICINE

## 2024-10-08 PROCEDURE — 1111F DSCHRG MED/CURRENT MED MERGE: CPT | Performed by: FAMILY MEDICINE

## 2024-10-08 PROCEDURE — 1158F ADVNC CARE PLAN TLK DOCD: CPT | Performed by: FAMILY MEDICINE

## 2024-10-08 PROCEDURE — 3078F DIAST BP <80 MM HG: CPT | Performed by: FAMILY MEDICINE

## 2024-10-08 PROCEDURE — 1126F AMNT PAIN NOTED NONE PRSNT: CPT | Performed by: FAMILY MEDICINE

## 2024-10-08 PROCEDURE — 1123F ACP DISCUSS/DSCN MKR DOCD: CPT | Performed by: FAMILY MEDICINE

## 2024-10-08 PROCEDURE — 90662 IIV NO PRSV INCREASED AG IM: CPT | Performed by: FAMILY MEDICINE

## 2024-10-08 PROCEDURE — 3074F SYST BP LT 130 MM HG: CPT | Performed by: FAMILY MEDICINE

## 2024-10-08 PROCEDURE — 4004F PT TOBACCO SCREEN RCVD TLK: CPT | Performed by: FAMILY MEDICINE

## 2024-10-08 RX ORDER — OMEPRAZOLE 40 MG/1
40 CAPSULE, DELAYED RELEASE ORAL
Qty: 90 CAPSULE | Refills: 3 | Status: SHIPPED | OUTPATIENT
Start: 2024-10-08 | End: 2025-10-08

## 2024-10-08 RX ORDER — LIDOCAINE 50 MG/G
1 PATCH TOPICAL DAILY
Qty: 30 PATCH | Refills: 5 | Status: SHIPPED | OUTPATIENT
Start: 2024-10-08 | End: 2025-10-08

## 2024-10-08 RX ORDER — GABAPENTIN 300 MG/1
300 CAPSULE ORAL NIGHTLY
Qty: 90 CAPSULE | Refills: 1 | Status: SHIPPED | OUTPATIENT
Start: 2024-10-08 | End: 2025-10-08

## 2024-10-08 RX ORDER — BUTYROSPERMUM PARKII(SHEA BUTTER), SIMMONDSIA CHINENSIS (JOJOBA) SEED OIL, ALOE BARBADENSIS LEAF EXTRACT .01; 1; 3.5 G/100G; G/100G; G/100G
250 LIQUID TOPICAL 2 TIMES DAILY
COMMUNITY

## 2024-10-08 RX ORDER — DICLOFENAC SODIUM 10 MG/G
4 GEL TOPICAL 2 TIMES DAILY PRN
Qty: 200 G | Refills: 5 | Status: SHIPPED | OUTPATIENT
Start: 2024-10-08

## 2024-10-08 ASSESSMENT — PAIN SCALES - GENERAL: PAINLEVEL: 0-NO PAIN

## 2024-10-08 ASSESSMENT — LIFESTYLE VARIABLES: HOW MANY STANDARD DRINKS CONTAINING ALCOHOL DO YOU HAVE ON A TYPICAL DAY: PATIENT DOES NOT DRINK

## 2024-10-08 NOTE — PATIENT INSTRUCTIONS
- Get labs done to recheck thyroid (non fasting)  - We changed pantoprazole to omeprazole for acid reflux  - Call to schedule CT lung cancer screening, outpatient PT  - Keep appt with GI as schedule 11/21/24

## 2024-10-08 NOTE — PROGRESS NOTES
History Of Present Illness  Indu Red is a 73 y.o. female presenting for Follow-up (Hospital follow up)  .    HPI     Patient was admitted to the hospital on 9/25/2024 for abdominal pain and nausea and vomiting secondary to ileus.  Patient was treated with MiraLAX, Dulcolax and a suppository resulting in several bowel movements and improvement in her symptoms.  She was started on MiraLAX twice daily.  She was also recommended to have outpatient colonoscopy.  She was discharged on 9/28/2024 in stable condition to home.  Transition of care out reach completed on 9/30/2024.  She has a follow-up appoint with gastroenterology on 11/21/2024.  TSH mildly elevated on 9/26/2024.    Today she drove herself.  Ambien needs independently.  Feeling significant improvement. BM are regular, daily.  She attributes it to starting a probiotic.  Still complaining of milder mid to left lower abdominal pain.   Intermittent mild nausea and has zofran which is effective.   Eating regular food and tolerating it well.  Still having acid reflux, something feel throat fullness.  Is taking pantoprazole 40 mg daily but she does not believe its effective.  She has chronic low back pain with bilateral sciatica.  She is feeling well enough to do outpatient physical therapy.  Gabapentin controls her pain.  She has had lidocaine in the past and it is effective for her pain.  She is an active smoker, not ready to quit.  Is due for her yearly CT lung cancer screening    Past Medical History  Patient Active Problem List    Diagnosis Date Noted    Ileus (Multi) 09/26/2024    Moderate right ankle sprain 06/18/2024    Achilles tendon sprain, right, initial encounter 06/18/2024    Unspecified osteoarthritis, unspecified site 05/06/2024    Healthcare maintenance 02/06/2024    Medicare annual wellness visit, subsequent 02/06/2024    Acute cystitis with hematuria 02/06/2024    Anemia 02/05/2024    History of thyroid disorder 02/05/2024    Tubular adenoma  of colon 02/05/2024    Urinary retention 01/08/2024    ELLIE (acute kidney injury) (CMS-HCC) 01/07/2024    Epigastric pain 01/05/2024    Hypertension 01/05/2024    Elevated troponin 01/04/2024    Nausea and vomiting 01/04/2024    Other sprain of right shoulder joint, sequela 12/19/2023    Partial nontraumatic tear of right rotator cuff 12/19/2023    Right shoulder pain 10/12/2023    Arteriosclerotic heart disease 09/21/2023    Encounter for screening mammogram for malignant neoplasm of breast 08/28/2023    Screening for malignant neoplasm of colon 08/28/2023    Nicotine dependence, cigarettes, uncomplicated 08/28/2023    Abscess 08/25/2023    Adhesive capsulitis of right shoulder 08/25/2023    Age-related nuclear cataract of both eyes 08/25/2023    Age-related osteoporosis without current pathological fracture 08/25/2023    Atrophy of vagina 08/25/2023    Bilateral presbyopia 08/25/2023    Bradycardia 08/25/2023    Bursitis of left shoulder 08/25/2023    Cervical radiculitis 08/25/2023    [place] 08/25/2023    Chronic pain 08/25/2023    Chronic pain of multiple joints 08/25/2023    Allergic rhinitis 08/25/2023    Chronic kidney disease, stage 3a (Multi) 08/25/2023    Constipation due to opioid therapy 08/25/2023    Diverticulosis 08/25/2023    DJD (degenerative joint disease), cervical 08/25/2023    Dry eye syndrome of both lacrimal glands 08/25/2023    Abnormal electrocardiography 08/25/2023    GERD (gastroesophageal reflux disease) 08/25/2023    History of colon polyps 08/25/2023    Hyperchloremia 08/25/2023    Hypercholesterolemia 08/25/2023    Hyperglycemia 08/25/2023    Hypernatremia 08/25/2023    Hyperopia of both eyes 08/25/2023    Incomplete bladder emptying 08/25/2023    Knee pain 08/25/2023    Leukocytosis 08/25/2023    Low back pain 08/25/2023    Marijuana use 08/25/2023    Meibomian gland dysfunction (MGD) of left eye 08/25/2023    Multiple sclerosis (Multi) 08/25/2023    Nasal sinus congestion  08/25/2023    Nicotine dependence 08/25/2023    Nuclear sclerosis 08/25/2023    OAB (overactive bladder) 08/25/2023    Osteoarthritis 08/25/2023    Osteopenia 08/25/2023    Osteoporosis 08/25/2023    Pain, pelvic, female 08/25/2023    Pigmented skin lesion of uncertain nature 08/25/2023    Postoperative hypothyroidism 08/25/2023    Postoperative pain 08/25/2023    PVD (posterior vitreous detachment), both eyes 08/25/2023    Radiculopathy 08/25/2023    Referred otalgia of right ear 08/25/2023    Schatzki's ring 08/25/2023    Sprain of MCL (medial collateral ligament) of knee 08/25/2023    Synovial plica syndrome of left knee 08/25/2023    TIA (transient ischemic attack) 08/25/2023    TMJ pain dysfunction syndrome 08/25/2023    Tobacco use disorder 08/25/2023    Vaginal discharge 08/25/2023    Vitamin D deficiency 08/25/2023    Vitreous degeneration 08/25/2023    Wheezing without diagnosis of asthma 08/25/2023    Antalgic gait 06/16/2023    Contracture of joint of left foot 06/16/2023    Hallux varus (acquired), left foot 06/16/2023    Hammertoe of second toe of left foot 06/16/2023    Metatarsalgia of left foot 06/16/2023    Other disturbances of skin sensation 06/16/2023    Pain in right shoulder 04/10/2023    Melanocytic nevi of trunk 01/10/2018    Other seborrheic keratosis 01/10/2018    S/P right rotator cuff repair 06/21/2016    Dental abscess 07/22/2014        Medications  Current Outpatient Medications   Medication Sig Dispense Refill    albuterol 90 mcg/actuation inhaler Inhale 2 puffs every 6 hours if needed for wheezing or shortness of breath.      aspirin 81 mg EC tablet 1 tablet (81 mg) once every 24 hours. AM      cholecalciferol (Vitamin D3) 25 MCG (1000 UT) tablet Take 1 tablet (25 mcg) by mouth once daily. AM      gabapentin (Neurontin) 100 mg capsule Take 1 capsule (100 mg) by mouth once daily in the morning. 90 capsule 1    ipratropium (Atrovent) 42 mcg (0.06 %) nasal spray 2 sprays 4 times a day as  needed for rhinitis.      levothyroxine (Synthroid, Levoxyl) 50 mcg tablet Take 1 tablet (50 mcg) by mouth once daily. 90 tablet 1    lisinopril 5 mg tablet Take 1 tablet (5 mg) by mouth once daily. 90 tablet 1    metoprolol succinate XL (Toprol-XL) 25 mg 24 hr tablet TAKE 1 TABLET BY MOUTH EVERY DAY FOR 90 DAYS 90 tablet 3    ondansetron ODT (Zofran-ODT) 4 mg disintegrating tablet Take 1 tablet (4 mg) by mouth every 8 hours if needed for nausea or vomiting. 40 tablet 1    polyethylene glycol (Glycolax, Miralax) 17 gram/dose powder Mix 17 grams (1 capful) with 8 ounces of liquid and take by mouth 2 times a day. 1020 g 1    potassium chloride CR 10 mEq ER tablet Take 1 tablet (10 mEq) by mouth once daily. DO NOT CRUSH CHEW OR SPLIT 90 tablet 3    rosuvastatin (Crestor) 20 mg tablet Take 1 tablet (20 mg) by mouth once daily. 100 tablet 3    saccharomyces boulardii (Florastor) 250 mg capsule Take 1 capsule (250 mg) by mouth 2 times a day.      tiZANidine (Zanaflex) 4 mg tablet TAKE 1 TABLET BY MOUTH THREE TIMES A DAY AS NEEDED 60 tablet 3    venlafaxine (Effexor) 25 mg tablet TAKE 1 TABLET BY MOUTH TWICE A  tablet 1    diclofenac sodium (Voltaren) 1 % gel Apply 4.5 inches (4 g) topically 2 times a day as needed (pain). 200 g 5    gabapentin (Neurontin) 300 mg capsule Take 1 capsule (300 mg) by mouth once daily at bedtime. 90 capsule 1    lidocaine (Lidoderm) 5 % patch Place 1 patch over 12 hours on the skin once daily. Apply to painful area 12 hours per day, remove for 12 hours. 30 patch 5    omeprazole (PriLOSEC) 40 mg DR capsule Take 1 capsule (40 mg) by mouth once daily in the morning. Take before meals. Do not crush or chew. 90 capsule 3     No current facility-administered medications for this visit.        Surgical History  She has a past surgical history that includes Appendectomy (08/03/2016); Other surgical history (08/03/2016); Colonoscopy (08/03/2016); Oophorectomy (08/03/2016); Total vaginal  hysterectomy (03/09/2017); MR angio neck wo IV contrast (07/21/2023); MR angio head wo IV contrast (07/21/2023); Thyroidectomy, partial; Salpingectomy; Temporomandibular joint surgery; Esophagogastroduodenoscopy; Foot surgery (Left); and Shoulder surgery (Right, 02/13/2024).     Social History  She reports that she has been smoking cigarettes. She has a 12.5 pack-year smoking history. She has been exposed to tobacco smoke. She has never used smokeless tobacco. She reports that she does not currently use alcohol. She reports current drug use. Frequency: 1.00 time per week. Drug: Marijuana.    Family History  Family History   Problem Relation Name Age of Onset    Other (PCI) Mother  80 - 89        Stent    Heart disease Mother      Hypertension Mother      Stomach cancer Father          Allergies  Other, Tetracyclines, Oxycodone, Oxycodone-acetaminophen, Penicillins, Pseudoephedrine, Acetaminophen-codeine, Codeine, Psyllium, and Sulfa (sulfonamide antibiotics)    Immunizations  Immunization History   Administered Date(s) Administered    Flu vaccine, quadrivalent, high-dose, preservative free, age 65y+ (FLUZONE) 11/14/2023    Flu vaccine, quadrivalent, recombinant, preservative free, adult (FLUBLOK) 11/04/2020    Flu vaccine, trivalent, preservative free, HIGH-DOSE, age 65y+ (Fluzone) 01/27/2017, 09/26/2017, 11/08/2018, 09/12/2019, 10/11/2021, 10/25/2022, 10/08/2024    Flu vaccine, trivalent, preservative free, age 6 months and greater (Fluarix/Fluzone/Flulaval) 11/13/2018    Influenza, Unspecified 10/25/2022    Influenza, seasonal, injectable 12/04/2012, 09/30/2013, 12/15/2015    Pfizer COVID-19 vaccine, bivalent, age 12 years and older (30 mcg/0.3 mL) 09/20/2022    Pfizer Gray Cap SARS-CoV-2 08/18/2022    Pfizer Purple Cap SARS-CoV-2 03/08/2021, 04/05/2021, 11/15/2021    Pneumococcal conjugate vaccine, 13-valent (PREVNAR 13) 05/29/2015, 03/12/2019    Pneumococcal polysaccharide vaccine, 23-valent, age 2 years and  "older (PNEUMOVAX 23) 02/25/2013, 10/09/2018    Tdap vaccine, age 7 year and older (BOOSTRIX, ADACEL) 05/10/2011    Zoster vaccine, recombinant, adult (SHINGRIX) 09/12/2019, 09/08/2020    Zoster, live 08/27/2012        ROS  Negative, except as discussed in HPI     Vitals  /70   Pulse 52   Temp 35.6 °C (96 °F)   Ht 1.575 m (5' 2\")   Wt 57.6 kg (127 lb)   SpO2 97%   BMI 23.23 kg/m²      Physical Exam  Vitals and nursing note reviewed.   Constitutional:       General: She is not in acute distress.     Appearance: Normal appearance.   Cardiovascular:      Rate and Rhythm: Normal rate and regular rhythm.      Heart sounds: Normal heart sounds.   Pulmonary:      Effort: No respiratory distress.      Breath sounds: Normal breath sounds.   Abdominal:      Palpations: Abdomen is soft.   Neurological:      General: No focal deficit present.      Mental Status: She is alert. Mental status is at baseline.   Psychiatric:         Mood and Affect: Mood normal.         Behavior: Behavior normal.         Thought Content: Thought content normal.         Judgment: Judgment normal.         Relevant Results  Lab Results   Component Value Date    WBC 6.6 09/28/2024    WBC 7.1 09/27/2024    HGB 10.8 (L) 09/28/2024    HGB 10.7 (L) 09/27/2024    HCT 33.2 (L) 09/28/2024    HCT 32.5 (L) 09/27/2024    MCV 84 09/28/2024    MCV 82 09/27/2024     09/28/2024     09/27/2024     Lab Results   Component Value Date     09/28/2024     09/27/2024    K 3.6 09/28/2024    K 4.2 09/27/2024     09/28/2024     09/27/2024    CO2 24 09/28/2024    CO2 21 09/27/2024    BUN 7 09/28/2024    BUN 12 09/27/2024    CREATININE 1.05 09/28/2024    CREATININE 0.96 09/27/2024    CALCIUM 8.7 09/28/2024    CALCIUM 8.8 09/27/2024    PROT 6.6 09/25/2024    PROT 7.1 06/04/2024    BILITOT 0.5 09/25/2024    BILITOT 0.9 06/04/2024    ALKPHOS 76 09/25/2024    ALKPHOS 79 06/04/2024    ALT 5 (L) 09/25/2024    ALT <5 (L) 06/04/2024    " AST 10 09/25/2024    AST 10 06/04/2024    GLUCOSE 71 (L) 09/28/2024    GLUCOSE 64 (L) 09/27/2024     Lab Results   Component Value Date    HGBA1C 5.4 06/04/2024     Lab Results   Component Value Date    TSH 7.44 (H) 09/26/2024    FREET4 1.22 09/26/2024      Lab Results   Component Value Date    CHOL 172 05/06/2024    TRIG 102 05/06/2024    HDL 61.3 05/06/2024    LDLDIRECT 92 02/06/2024           Assessment/Plan   Indu was seen today for follow-up.  Diagnoses and all orders for this visit:  Hospital discharge follow-up (Primary)  -     Basic Metabolic Panel; Future  Abdominal pain, unspecified abdominal location  Comments:  improving; see GI as scheduled.  Personal history of tobacco use  Comments:  Counseled on cessation.  Lung cancer screen  Orders:  -     CT lung screening low dose; Future  Gastroesophageal reflux disease, unspecified whether esophagitis present  -     omeprazole (PriLOSEC) 40 mg DR capsule; Take 1 capsule (40 mg) by mouth once daily in the morning. Take before meals. Do not crush or chew.  Postoperative hypothyroidism  -     TSH with reflex to Free T4 if abnormal; Future  Hypothyroidism, unspecified type  Chronic bilateral low back pain with bilateral sciatica  -     gabapentin (Neurontin) 300 mg capsule; Take 1 capsule (300 mg) by mouth once daily at bedtime.  -     diclofenac sodium (Voltaren) 1 % gel; Apply 4.5 inches (4 g) topically 2 times a day as needed (pain).  -     Referral to Physical Therapy; Future  -     lidocaine (Lidoderm) 5 % patch; Place 1 patch over 12 hours on the skin once daily. Apply to painful area 12 hours per day, remove for 12 hours.  Other orders  -     Flu vaccine, trivalent, preservative free, HIGH-DOSE, age 65y+ (Fluzone)         Counseling:   Medication education:   -Education:  The patient is counseled regarding potential side-effects of any and all new medications  -Understanding:  Patient expressed understanding of information discussed today  -Adherence:  No  barriers to adherence identified    Final treatment plan is a result of shared decision making with patient.         Otto Momin MD

## 2024-10-10 ENCOUNTER — PATIENT OUTREACH (OUTPATIENT)
Dept: PRIMARY CARE | Facility: CLINIC | Age: 74
End: 2024-10-10
Payer: COMMERCIAL

## 2024-10-10 DIAGNOSIS — Z12.11 COLON CANCER SCREENING: Primary | ICD-10-CM

## 2024-10-10 RX ORDER — SODIUM, POTASSIUM,MAG SULFATES 17.5-3.13G
1 SOLUTION, RECONSTITUTED, ORAL ORAL
Qty: 1 EACH | Refills: 0 | Status: SHIPPED | OUTPATIENT
Start: 2024-10-10 | End: 2024-10-11

## 2024-10-10 NOTE — PROGRESS NOTES
Unable to reach patient for call back after recent hospitalization.   LVM with call back number for patient to call if needed

## 2024-10-28 ENCOUNTER — HOSPITAL ENCOUNTER (OUTPATIENT)
Dept: RADIOLOGY | Facility: CLINIC | Age: 74
Discharge: HOME | End: 2024-10-28
Payer: COMMERCIAL

## 2024-10-28 DIAGNOSIS — Z87.891 PERSONAL HISTORY OF TOBACCO USE: ICD-10-CM

## 2024-10-28 PROCEDURE — 71271 CT THORAX LUNG CANCER SCR C-: CPT | Performed by: RADIOLOGY

## 2024-10-28 PROCEDURE — 71271 CT THORAX LUNG CANCER SCR C-: CPT

## 2024-11-05 DIAGNOSIS — M54.42 CHRONIC BILATERAL LOW BACK PAIN WITH BILATERAL SCIATICA: ICD-10-CM

## 2024-11-05 DIAGNOSIS — G89.29 CHRONIC BILATERAL LOW BACK PAIN WITH BILATERAL SCIATICA: ICD-10-CM

## 2024-11-05 DIAGNOSIS — M54.41 CHRONIC BILATERAL LOW BACK PAIN WITH BILATERAL SCIATICA: ICD-10-CM

## 2024-11-05 RX ORDER — DICLOFENAC SODIUM 10 MG/G
4 GEL TOPICAL 2 TIMES DAILY PRN
Qty: 200 G | Refills: 5 | Status: SHIPPED | OUTPATIENT
Start: 2024-11-05

## 2024-11-05 RX ORDER — LIDOCAINE 50 MG/G
1 PATCH TOPICAL DAILY
Qty: 30 PATCH | Refills: 5 | Status: SHIPPED | OUTPATIENT
Start: 2024-11-05 | End: 2025-11-05

## 2024-11-05 NOTE — TELEPHONE ENCOUNTER
Patient states she needs a refill of diclofenac sodium (Voltaren) 1 % gel and Lidoderm patches for her back

## 2024-11-19 NOTE — PROGRESS NOTES
Gastroenterology Clinic Consult Note    Reason For Consult  N/V and constipation    History Of Present Illness  Indu Red is a 73 y.o. female with a past medical history of HTN, HLD, MS, OA, heartburn, hypothyroidism, Pelvic organ/uterine prolapse s/p CINDY  & BSO who presents to GI clinic for N/V and constipation    She was admitted for 3 days in 9/2024 for nausea and vomiting likely 2/2 to ileus and constipation. CT was done showing dilated loops of small bowel concerning for constipation or enterocoloitis. She was given miralax/golytely bowel prep to purge and had multiple bowel movements with symptomatic improvement. She was noted to be overdue for surveillance colonoscopy so this was ordered for her at the time of discharge and is scheduled for 11/25.    Has a lot of stomach upset and constipation this year. A lot of N/V that is intermittent. No clear food triggers, but is definitely worse when she is more constipated. It is also present when she feels like she is having good daily bowel movements. Does not feel like it is heart burn. Not clearly associated with food. Started omeprazole 2 months ago in October 2024 and has not noticed a difference    She thinks it might be her medications that are constipating her. She has stopped a few of them in the last week though she does not remember which ones. Has hard stools. Has been on miralax once daily. Sometimes has trouble getting stool out.     No NSAIDS        Past Medical History  Past Medical History:   Diagnosis Date    Anemia     Esophagitis     Gastroesophageal reflux disease due to diaphragmatic hernia     Goiter     Hypertension     Left shoulder pain     Multiple sclerosis (Multi)     Personal history of other diseases of the female genital tract 08/03/2016    History of ovarian cyst    Personal history of other diseases of the respiratory system 01/19/2017    History of acute bronchitis    Personal history of other specified conditions 08/03/2016     History of wheezing    Postoperative urinary retention     Shoulder pain, right     Syncope and collapse 05/19/2024    Ulcer of esophagus without bleeding     Esophageal erosions       Surgical History  Past Surgical History:   Procedure Laterality Date    APPENDECTOMY  08/03/2016    COLONOSCOPY  08/03/2016    With polypectomy    ESOPHAGOGASTRODUODENOSCOPY      FOOT SURGERY Left     MR HEAD ANGIO WO IV CONTRAST  07/21/2023    MR HEAD ANGIO WO IV CONTRAST 7/21/2023 [facility]    MR NECK ANGIO WO IV CONTRAST  07/21/2023    MR NECK ANGIO WO IV CONTRAST 7/21/2023 [facility]    OOPHORECTOMY  08/03/2016    OTHER SURGICAL HISTORY  08/03/2016    Cervical Conization    SALPINGECTOMY      SHOULDER SURGERY Right 02/13/2024    TEMPOROMANDIBULAR JOINT SURGERY      THYROIDECTOMY, PARTIAL      Substernal    TOTAL VAGINAL HYSTERECTOMY  03/09/2017       Social History  Social Drivers of Health     Tobacco Use: High Risk (10/8/2024)    Patient History     Smoking Tobacco Use: Every Day     Smokeless Tobacco Use: Never     Passive Exposure: Current   Alcohol Use: Not At Risk (10/8/2024)    AUDIT-C     Frequency of Alcohol Consumption: Monthly or less     Average Number of Drinks: Patient does not drink     Frequency of Binge Drinking: Never   Financial Resource Strain: Low Risk  (9/28/2024)    Overall Financial Resource Strain (CARDIA)     Difficulty of Paying Living Expenses: Not hard at all   Food Insecurity: No Food Insecurity (9/27/2024)    Hunger Vital Sign     Worried About Running Out of Food in the Last Year: Never true     Ran Out of Food in the Last Year: Never true   Transportation Needs: No Transportation Needs (9/28/2024)    PRAPARE - Transportation     Lack of Transportation (Medical): No     Lack of Transportation (Non-Medical): No   Physical Activity: Sufficiently Active (6/5/2024)    Exercise Vital Sign     Days of Exercise per Week: 7 days     Minutes of Exercise per Session: 60 min   Stress: No Stress Concern Present  "(6/5/2024)    [employer] [place] [employer] - Occupational Stress Questionnaire     Feeling of Stress : Only a little   Social Connections: Unknown (6/5/2024)    Social Connection and Isolation Panel [NHANES]     Frequency of Communication with Friends and Family: More than three times a week     Frequency of Social Gatherings with Friends and Family: More than three times a week     Attends [employer] Services: Not on file     Active Member of Clubs or Organizations: Not on file     Attends Club or Organization Meetings: Not on file     Marital Status: Not on file   Intimate Partner Violence: Not At Risk (9/27/2024)    Humiliation, Afraid, Rape, and Kick questionnaire     Fear of Current or Ex-Partner: No     Emotionally Abused: No     Physically Abused: No     Sexually Abused: No   Depression: Not at risk (10/8/2024)    PHQ-2     PHQ-2 Score: 0   Housing Stability: Low Risk  (9/28/2024)    Housing Stability Vital Sign     Unable to Pay for Housing in the Last Year: No     Number of Times Moved in the Last Year: 0     Homeless in the Last Year: No   Utilities: Not At Risk (9/27/2024)    [facility] Utilities     Threatened with loss of utilities: No   Digital Equity: Not on file   Health Literacy: Adequate Health Literacy (6/5/2024)     Health Literacy     Frequency of need for help with medical instructions: Never       Family History  Family History   Problem Relation Name Age of Onset    Other (PCI) Mother  80 - 89        Stent    Heart disease Mother      Hypertension Mother      Stomach cancer Father          Allergies  Allergies   Allergen Reactions    Other Anaphylaxis    Tetracyclines Anaphylaxis, Unknown, Nausea And Vomiting and Nausea Only    Oxycodone Nausea And Vomiting and Nausea Only    Oxycodone-Acetaminophen Unknown and Other     vomiting    Penicillins Other and Unknown     \"I STOP BREATHING\"    Pseudoephedrine Nausea And Vomiting and Nausea Only    Acetaminophen-Codeine Unknown    Codeine " Nausea/vomiting     DIZZINESS    Psyllium Nausea/vomiting     BLOATING    Sulfa (Sulfonamide Antibiotics) Rash       Home Medications    Current Outpatient Medications:     albuterol 90 mcg/actuation inhaler, Inhale 2 puffs every 6 hours if needed for wheezing or shortness of breath., Disp: , Rfl:     aspirin 81 mg EC tablet, 1 tablet (81 mg) once every 24 hours. AM, Disp: , Rfl:     cholecalciferol (Vitamin D3) 25 MCG (1000 UT) tablet, Take 1 tablet (25 mcg) by mouth once daily. AM, Disp: , Rfl:     diclofenac sodium (Voltaren) 1 % gel, Apply 4.5 inches (4 g) topically 2 times a day as needed (pain)., Disp: 200 g, Rfl: 5    gabapentin (Neurontin) 100 mg capsule, Take 1 capsule (100 mg) by mouth once daily in the morning., Disp: 90 capsule, Rfl: 1    gabapentin (Neurontin) 300 mg capsule, Take 1 capsule (300 mg) by mouth once daily at bedtime., Disp: 90 capsule, Rfl: 1    ipratropium (Atrovent) 42 mcg (0.06 %) nasal spray, 2 sprays 4 times a day as needed for rhinitis., Disp: , Rfl:     levothyroxine (Synthroid, Levoxyl) 50 mcg tablet, Take 1 tablet (50 mcg) by mouth once daily., Disp: 90 tablet, Rfl: 1    lidocaine (Lidoderm) 5 % patch, Place 1 patch over 12 hours on the skin once daily. Apply to painful area 12 hours per day, remove for 12 hours., Disp: 30 patch, Rfl: 5    lisinopril 5 mg tablet, Take 1 tablet (5 mg) by mouth once daily., Disp: 90 tablet, Rfl: 1    metoprolol succinate XL (Toprol-XL) 25 mg 24 hr tablet, TAKE 1 TABLET BY MOUTH EVERY DAY FOR 90 DAYS, Disp: 90 tablet, Rfl: 3    omeprazole (PriLOSEC) 40 mg DR capsule, Take 1 capsule (40 mg) by mouth once daily in the morning. Take before meals. Do not crush or chew., Disp: 90 capsule, Rfl: 3    ondansetron ODT (Zofran-ODT) 4 mg disintegrating tablet, Take 1 tablet (4 mg) by mouth every 8 hours if needed for nausea or vomiting., Disp: 40 tablet, Rfl: 1    polyethylene glycol (Glycolax, Miralax) 17 gram/dose powder, Mix 17 grams (1 capful) with 8 ounces  of liquid and take by mouth 2 times a day., Disp: 1020 g, Rfl: 1    potassium chloride CR 10 mEq ER tablet, Take 1 tablet (10 mEq) by mouth once daily. DO NOT CRUSH CHEW OR SPLIT, Disp: 90 tablet, Rfl: 3    rosuvastatin (Crestor) 20 mg tablet, Take 1 tablet (20 mg) by mouth once daily., Disp: 100 tablet, Rfl: 3    saccharomyces boulardii (Florastor) 250 mg capsule, Take 1 capsule (250 mg) by mouth 2 times a day., Disp: , Rfl:     tiZANidine (Zanaflex) 4 mg tablet, TAKE 1 TABLET BY MOUTH THREE TIMES A DAY AS NEEDED, Disp: 60 tablet, Rfl: 3    venlafaxine (Effexor) 25 mg tablet, TAKE 1 TABLET BY MOUTH TWICE A DAY, Disp: 180 tablet, Rfl: 1    Review of Systems  As above     Last Recorded Vitals  There were no vitals taken for this visit.    Physical Exam  General: well-nourished, no acute distress  HEENT: PERRLA, EOM intact, no scleral icterus, moist MM  Respiratory: CTA bilaterally, normal work of breathing  Cardiovascular: RRR, no murmurs/rubs/gallops  Abdomen: Soft, nontender, nondistended, bowel sounds present, no masses palpated, no organomeagly  Perianal/Rectal: chaperone was present, small thrombosed external hemorrhoid without pain. No rectal prolapse, hard stool felt in rectal vault  Extremities: no edema, no asterixis  Neuro: alert and oriented, CNII-XII grossly intact, moves all 4 extremities with no focal deficits      Relevant Results    Current Outpatient Medications:     albuterol 90 mcg/actuation inhaler, Inhale 2 puffs every 6 hours if needed for wheezing or shortness of breath., Disp: , Rfl:     aspirin 81 mg EC tablet, 1 tablet (81 mg) once every 24 hours. AM, Disp: , Rfl:     cholecalciferol (Vitamin D3) 25 MCG (1000 UT) tablet, Take 1 tablet (25 mcg) by mouth once daily. AM, Disp: , Rfl:     diclofenac sodium (Voltaren) 1 % gel, Apply 4.5 inches (4 g) topically 2 times a day as needed (pain)., Disp: 200 g, Rfl: 5    gabapentin (Neurontin) 100 mg capsule, Take 1 capsule (100 mg) by mouth once daily  in the morning., Disp: 90 capsule, Rfl: 1    gabapentin (Neurontin) 300 mg capsule, Take 1 capsule (300 mg) by mouth once daily at bedtime., Disp: 90 capsule, Rfl: 1    ipratropium (Atrovent) 42 mcg (0.06 %) nasal spray, 2 sprays 4 times a day as needed for rhinitis., Disp: , Rfl:     levothyroxine (Synthroid, Levoxyl) 50 mcg tablet, Take 1 tablet (50 mcg) by mouth once daily., Disp: 90 tablet, Rfl: 1    lidocaine (Lidoderm) 5 % patch, Place 1 patch over 12 hours on the skin once daily. Apply to painful area 12 hours per day, remove for 12 hours., Disp: 30 patch, Rfl: 5    lisinopril 5 mg tablet, Take 1 tablet (5 mg) by mouth once daily., Disp: 90 tablet, Rfl: 1    metoprolol succinate XL (Toprol-XL) 25 mg 24 hr tablet, TAKE 1 TABLET BY MOUTH EVERY DAY FOR 90 DAYS, Disp: 90 tablet, Rfl: 3    omeprazole (PriLOSEC) 40 mg DR capsule, Take 1 capsule (40 mg) by mouth once daily in the morning. Take before meals. Do not crush or chew., Disp: 90 capsule, Rfl: 3    ondansetron ODT (Zofran-ODT) 4 mg disintegrating tablet, Take 1 tablet (4 mg) by mouth every 8 hours if needed for nausea or vomiting., Disp: 40 tablet, Rfl: 1    polyethylene glycol (Glycolax, Miralax) 17 gram/dose powder, Mix 17 grams (1 capful) with 8 ounces of liquid and take by mouth 2 times a day., Disp: 1020 g, Rfl: 1    potassium chloride CR 10 mEq ER tablet, Take 1 tablet (10 mEq) by mouth once daily. DO NOT CRUSH CHEW OR SPLIT, Disp: 90 tablet, Rfl: 3    rosuvastatin (Crestor) 20 mg tablet, Take 1 tablet (20 mg) by mouth once daily., Disp: 100 tablet, Rfl: 3    saccharomyces boulardii (Florastor) 250 mg capsule, Take 1 capsule (250 mg) by mouth 2 times a day., Disp: , Rfl:     tiZANidine (Zanaflex) 4 mg tablet, TAKE 1 TABLET BY MOUTH THREE TIMES A DAY AS NEEDED, Disp: 60 tablet, Rfl: 3    venlafaxine (Effexor) 25 mg tablet, TAKE 1 TABLET BY MOUTH TWICE A DAY, Disp: 180 tablet, Rfl: 1     Lab Results   Component Value Date    WBC 6.6 09/28/2024    HGB  10.8 (L) 2024    HCT 33.2 (L) 2024    MCV 84 2024     2024     Lab Results   Component Value Date    GLUCOSE 71 (L) 2024    CALCIUM 8.7 2024     2024    K 3.6 2024    CO2 24 2024     2024    BUN 7 2024    CREATININE 1.05 2024     Lab Results   Component Value Date    ALT 5 (L) 2024    AST 10 2024    ALKPHOS 76 2024    BILITOT 0.5 2024       Imagin2024 CT abdomen pelvis with IV contrast  IMPRESSION:  Mild nonspecific fluid diffusely throughout multiple prominent but  not overtly dilated small bowel loops as well as the proximal colon.  No discrete transition point. Considerations include but are not  limited to ileus, enterocolitis, or (less likely) partial obstruction  without visualized transition point.    Procedures:    2019 Colonoscopy  Impression:            - Decreased sphincter tone found on digital rectal                          exam.                         - Stool in the entire examined colon.                         - The examined portion of the ileum was normal.                         - Two 1 to 2 mm polyps in the cecum, removed with a                          cold snare. Resected and retrieved.                         - Two 3 to 4 mm polyps in the transverse colon,                          removed with a cold snare. Resected and retrieved.                         - Three 2 to 6 mm polyps in the descending colon,                          removed with a cold snare. Resected and retrieved.                         - Moderate diverticulosis in the sigmoid colon and in                          the descending colon. There was no evidence of                          diverticular bleeding.  Recommendation:                                 - Repeat colonoscopy in 1-3 years because the bowel                          preparation was poor and for surveillance based on                           pathology results.    FINAL DIAGNOSIS  A. POLYP X2, CECUM, POLYPECTOMY (COLD SNARE):  -- FRAGMENT OF TUBULAR ADENOMA.  -- OTHER FRAGMENTS OF COLONIC MUCOSA WITH NO SIGNIFICANT PATHOLOGIC FINDINGS.     B. POLYP X2, TRANSVERSE COLON, POLYPECTOMY (COLD SNARE):   -- FRAGMENT OF TUBULAR ADENOMA.  -- FRAGMENTS OF SESSILE SERRATED ADENOMA.    C. POLYP X2, DESCENDING COLON, POLYPECTOMY (COLD SNARE):   -- FRAGMENTS OF ADENOMA.       ASSESSMENT/PLAN:  Indu Red is a 73 y.o. female with a past medical history of HTN, HLD, MS, OA, heartburn, hypothyroidism, Pelvic organ/uterine prolapse s/p CINDY  & BSO who presents to GI clinic for N/V and constipation    Ongoing dyspepsia despite PPI trial. Can plan to add EGD on to upcoming surveillance colonoscopy. Has ongoing constipation for which we will try to increase her osmotic laxative and fiber.    Plan:  -Plan to add EGD to upcoming colonoscopy  -Continue Priolosec once daily on empty stomach for now  -Increase Miralax to BID  -Add fiber once daily  -RTC 6 months. If no improvement, may need to consider pelvic floor dysfunction given prior known prolapse.    I spent 45 minutes in the professional and overall care of this patient.    Jossy Recio MD

## 2024-11-21 ENCOUNTER — OFFICE VISIT (OUTPATIENT)
Dept: GASTROENTEROLOGY | Facility: HOSPITAL | Age: 74
End: 2024-11-21
Payer: COMMERCIAL

## 2024-11-21 VITALS
SYSTOLIC BLOOD PRESSURE: 195 MMHG | DIASTOLIC BLOOD PRESSURE: 111 MMHG | TEMPERATURE: 97.7 F | WEIGHT: 122.7 LBS | OXYGEN SATURATION: 98 % | BODY MASS INDEX: 22.44 KG/M2 | RESPIRATION RATE: 22 BRPM | HEART RATE: 95 BPM

## 2024-11-21 DIAGNOSIS — K59.00 CONSTIPATION, UNSPECIFIED CONSTIPATION TYPE: Primary | ICD-10-CM

## 2024-11-21 RX ORDER — POLYETHYLENE GLYCOL 3350, SODIUM SULFATE ANHYDROUS, SODIUM BICARBONATE, SODIUM CHLORIDE, POTASSIUM CHLORIDE 236; 22.74; 6.74; 5.86; 2.97 G/4L; G/4L; G/4L; G/4L; G/4L
4 POWDER, FOR SOLUTION ORAL ONCE
Qty: 4000 ML | Refills: 0 | Status: SHIPPED | OUTPATIENT
Start: 2024-11-21 | End: 2024-11-21 | Stop reason: ALTCHOICE

## 2024-11-21 ASSESSMENT — PAIN SCALES - GENERAL: PAINLEVEL_OUTOF10: 8

## 2024-11-21 NOTE — PATIENT INSTRUCTIONS
Thank you for coming to GI clinic today  We will add an upper endoscopy (EGD) to the colonoscopy you have scheduled in January.  I have ordered Golytely for your bowel prep for your Colonoscopy in January. You can pick this up now but do not take it until the night before your procedure  Slowly add back your medications one at a time to see if you can figure out which one is making you more constipated  Increase your Miralax to twice daily  Add in fiber supplementation (metamucil, benefiber, citrucel) mixed in 1 glass of water daily too. This can be purchased over the counter  Continue Prilosec once daily in the mornings  Come back to GI clinic in 6 months

## 2024-11-25 ENCOUNTER — TELEPHONE (OUTPATIENT)
Dept: PRIMARY CARE | Facility: CLINIC | Age: 74
End: 2024-11-25

## 2024-11-25 ENCOUNTER — APPOINTMENT (OUTPATIENT)
Dept: GASTROENTEROLOGY | Facility: HOSPITAL | Age: 74
End: 2024-11-25
Payer: COMMERCIAL

## 2024-11-25 ENCOUNTER — HOSPITAL ENCOUNTER (OUTPATIENT)
Dept: RADIOLOGY | Facility: HOSPITAL | Age: 74
Discharge: HOME | End: 2024-11-25
Payer: COMMERCIAL

## 2024-11-25 ENCOUNTER — LAB (OUTPATIENT)
Dept: LAB | Facility: LAB | Age: 74
End: 2024-11-25
Payer: COMMERCIAL

## 2024-11-25 DIAGNOSIS — M25.572 PAIN IN LEFT ANKLE AND JOINTS OF LEFT FOOT: ICD-10-CM

## 2024-11-25 DIAGNOSIS — E78.00 HYPERCHOLESTEROLEMIA: ICD-10-CM

## 2024-11-25 DIAGNOSIS — E87.6 HYPOKALEMIA: ICD-10-CM

## 2024-11-25 DIAGNOSIS — R53.1 GENERALIZED WEAKNESS: ICD-10-CM

## 2024-11-25 DIAGNOSIS — E89.0 POSTOPERATIVE HYPOTHYROIDISM: ICD-10-CM

## 2024-11-25 DIAGNOSIS — M79.672 PAIN IN LEFT FOOT: ICD-10-CM

## 2024-11-25 DIAGNOSIS — Z09 HOSPITAL DISCHARGE FOLLOW-UP: ICD-10-CM

## 2024-11-25 DIAGNOSIS — E03.9 HYPOTHYROIDISM, UNSPECIFIED TYPE: Primary | ICD-10-CM

## 2024-11-25 LAB
ANION GAP SERPL CALCULATED.3IONS-SCNC: 14 MMOL/L (ref 10–20)
BUN SERPL-MCNC: 13 MG/DL (ref 6–23)
CALCIUM SERPL-MCNC: 9.1 MG/DL (ref 8.6–10.3)
CHLORIDE SERPL-SCNC: 109 MMOL/L (ref 98–107)
CHOLEST SERPL-MCNC: 194 MG/DL (ref 0–199)
CHOLEST/HDLC SERPL: 3.5 {RATIO}
CO2 SERPL-SCNC: 23 MMOL/L (ref 21–32)
CREAT SERPL-MCNC: 1.21 MG/DL (ref 0.5–1.05)
EGFRCR SERPLBLD CKD-EPI 2021: 47 ML/MIN/1.73M*2
GLUCOSE SERPL-MCNC: 76 MG/DL (ref 74–99)
HDLC SERPL-MCNC: 55.7 MG/DL
LDLC SERPL CALC-MCNC: 110 MG/DL
NON HDL CHOLESTEROL: 138 MG/DL (ref 0–149)
POTASSIUM SERPL-SCNC: 4.6 MMOL/L (ref 3.5–5.3)
SODIUM SERPL-SCNC: 141 MMOL/L (ref 136–145)
TRIGL SERPL-MCNC: 142 MG/DL (ref 0–149)
VLDL: 28 MG/DL (ref 0–40)

## 2024-11-25 PROCEDURE — 80061 LIPID PANEL: CPT

## 2024-11-25 PROCEDURE — 73630 X-RAY EXAM OF FOOT: CPT | Mod: LEFT SIDE | Performed by: RADIOLOGY

## 2024-11-25 PROCEDURE — 80048 BASIC METABOLIC PNL TOTAL CA: CPT

## 2024-11-25 PROCEDURE — 36415 COLL VENOUS BLD VENIPUNCTURE: CPT

## 2024-11-25 PROCEDURE — 73630 X-RAY EXAM OF FOOT: CPT | Mod: LT

## 2024-11-25 NOTE — TELEPHONE ENCOUNTER
Received call from  lab that TSH lab was cancelled due to not enough specimen. Please place new order for patient to have this drawn and office will call patient to notify her lab order has been placed and needs to be re-drawn

## 2024-12-12 DIAGNOSIS — I10 BENIGN ESSENTIAL HYPERTENSION: ICD-10-CM

## 2024-12-12 NOTE — TELEPHONE ENCOUNTER
Last ov 10/24 hosp follow up  Last med follow up ov 6/24, overdue for physical. LM to schedule appt.

## 2024-12-13 RX ORDER — LISINOPRIL 5 MG/1
5 TABLET ORAL DAILY
Qty: 90 TABLET | Refills: 0 | Status: SHIPPED | OUTPATIENT
Start: 2024-12-13

## 2024-12-17 ENCOUNTER — LAB (OUTPATIENT)
Dept: LAB | Facility: LAB | Age: 74
End: 2024-12-17
Payer: COMMERCIAL

## 2024-12-17 DIAGNOSIS — E03.9 HYPOTHYROIDISM, UNSPECIFIED TYPE: ICD-10-CM

## 2024-12-17 LAB — TSH SERPL-ACNC: 2.32 MIU/L (ref 0.44–3.98)

## 2024-12-17 PROCEDURE — 36415 COLL VENOUS BLD VENIPUNCTURE: CPT

## 2024-12-17 PROCEDURE — 84443 ASSAY THYROID STIM HORMONE: CPT

## 2025-01-24 ENCOUNTER — APPOINTMENT (OUTPATIENT)
Dept: GASTROENTEROLOGY | Facility: HOSPITAL | Age: 75
End: 2025-01-24
Payer: COMMERCIAL

## 2025-01-26 ENCOUNTER — ANESTHESIA EVENT (OUTPATIENT)
Dept: GASTROENTEROLOGY | Facility: HOSPITAL | Age: 75
End: 2025-01-26
Payer: COMMERCIAL

## 2025-01-27 ENCOUNTER — HOSPITAL ENCOUNTER (OUTPATIENT)
Dept: GASTROENTEROLOGY | Facility: HOSPITAL | Age: 75
Discharge: HOME | End: 2025-01-27
Payer: COMMERCIAL

## 2025-01-27 ENCOUNTER — ANESTHESIA (OUTPATIENT)
Dept: GASTROENTEROLOGY | Facility: HOSPITAL | Age: 75
End: 2025-01-27
Payer: COMMERCIAL

## 2025-01-27 VITALS
DIASTOLIC BLOOD PRESSURE: 95 MMHG | SYSTOLIC BLOOD PRESSURE: 201 MMHG | WEIGHT: 120 LBS | TEMPERATURE: 97 F | OXYGEN SATURATION: 99 % | BODY MASS INDEX: 21.95 KG/M2 | RESPIRATION RATE: 18 BRPM | HEART RATE: 66 BPM

## 2025-01-27 DIAGNOSIS — Z86.0101 HISTORY OF ADENOMATOUS POLYP OF COLON: Primary | ICD-10-CM

## 2025-01-27 DIAGNOSIS — K59.00 CONSTIPATION, UNSPECIFIED CONSTIPATION TYPE: ICD-10-CM

## 2025-01-27 PROBLEM — Z98.890 PONV (POSTOPERATIVE NAUSEA AND VOMITING): Status: ACTIVE | Noted: 2024-01-04

## 2025-01-27 PROCEDURE — 3700000002 HC GENERAL ANESTHESIA TIME - EACH INCREMENTAL 1 MINUTE

## 2025-01-27 PROCEDURE — 2500000004 HC RX 250 GENERAL PHARMACY W/ HCPCS (ALT 636 FOR OP/ED): Performed by: ANESTHESIOLOGY

## 2025-01-27 PROCEDURE — 43239 EGD BIOPSY SINGLE/MULTIPLE: CPT | Performed by: INTERNAL MEDICINE

## 2025-01-27 PROCEDURE — 7100000009 HC PHASE TWO TIME - INITIAL BASE CHARGE

## 2025-01-27 PROCEDURE — 2500000004 HC RX 250 GENERAL PHARMACY W/ HCPCS (ALT 636 FOR OP/ED)

## 2025-01-27 PROCEDURE — A45378 PR COLONOSCOPY,DIAGNOSTIC

## 2025-01-27 PROCEDURE — 3700000001 HC GENERAL ANESTHESIA TIME - INITIAL BASE CHARGE

## 2025-01-27 PROCEDURE — 45378 DIAGNOSTIC COLONOSCOPY: CPT | Mod: 52 | Performed by: INTERNAL MEDICINE

## 2025-01-27 PROCEDURE — 99100 ANES PT EXTEME AGE<1 YR&>70: CPT | Performed by: ANESTHESIOLOGY

## 2025-01-27 PROCEDURE — 7100000010 HC PHASE TWO TIME - EACH INCREMENTAL 1 MINUTE

## 2025-01-27 PROCEDURE — G0105 COLORECTAL SCRN; HI RISK IND: HCPCS | Performed by: INTERNAL MEDICINE

## 2025-01-27 PROCEDURE — A45378 PR COLONOSCOPY,DIAGNOSTIC: Performed by: ANESTHESIOLOGY

## 2025-01-27 PROCEDURE — 2500000004 HC RX 250 GENERAL PHARMACY W/ HCPCS (ALT 636 FOR OP/ED): Performed by: STUDENT IN AN ORGANIZED HEALTH CARE EDUCATION/TRAINING PROGRAM

## 2025-01-27 RX ORDER — LIDOCAINE HYDROCHLORIDE 10 MG/ML
0.1 INJECTION, SOLUTION EPIDURAL; INFILTRATION; INTRACAUDAL; PERINEURAL ONCE
Status: DISCONTINUED | OUTPATIENT
Start: 2025-01-27 | End: 2025-01-28 | Stop reason: HOSPADM

## 2025-01-27 RX ORDER — ONDANSETRON HYDROCHLORIDE 2 MG/ML
4 INJECTION, SOLUTION INTRAVENOUS ONCE AS NEEDED
Status: COMPLETED | OUTPATIENT
Start: 2025-01-27 | End: 2025-01-27

## 2025-01-27 RX ORDER — SODIUM CHLORIDE, SODIUM LACTATE, POTASSIUM CHLORIDE, CALCIUM CHLORIDE 600; 310; 30; 20 MG/100ML; MG/100ML; MG/100ML; MG/100ML
100 INJECTION, SOLUTION INTRAVENOUS CONTINUOUS
Status: DISCONTINUED | OUTPATIENT
Start: 2025-01-27 | End: 2025-01-28 | Stop reason: HOSPADM

## 2025-01-27 RX ORDER — LIDOCAINE HCL/PF 100 MG/5ML
SYRINGE (ML) INTRAVENOUS AS NEEDED
Status: DISCONTINUED | OUTPATIENT
Start: 2025-01-27 | End: 2025-01-27

## 2025-01-27 RX ORDER — HYDRALAZINE HYDROCHLORIDE 20 MG/ML
10 INJECTION INTRAMUSCULAR; INTRAVENOUS ONCE
Status: COMPLETED | OUTPATIENT
Start: 2025-01-27 | End: 2025-01-27

## 2025-01-27 RX ORDER — POLYETHYLENE GLYCOL 3350, SODIUM SULFATE ANHYDROUS, SODIUM BICARBONATE, SODIUM CHLORIDE, POTASSIUM CHLORIDE 236; 22.74; 6.74; 5.86; 2.97 G/4L; G/4L; G/4L; G/4L; G/4L
4000 POWDER, FOR SOLUTION ORAL ONCE
Qty: 4000 ML | Refills: 0 | Status: SHIPPED | OUTPATIENT
Start: 2025-01-27 | End: 2025-01-27

## 2025-01-27 RX ORDER — POLYETHYLENE GLYCOL 3350 17 G/17G
34 POWDER, FOR SOLUTION ORAL 2 TIMES DAILY
Qty: 476 G | Refills: 0 | Status: SHIPPED | OUTPATIENT
Start: 2025-01-27 | End: 2025-02-03

## 2025-01-27 RX ORDER — LABETALOL HYDROCHLORIDE 5 MG/ML
INJECTION, SOLUTION INTRAVENOUS AS NEEDED
Status: DISCONTINUED | OUTPATIENT
Start: 2025-01-27 | End: 2025-01-27

## 2025-01-27 RX ORDER — HYDRALAZINE HYDROCHLORIDE 20 MG/ML
5 INJECTION INTRAMUSCULAR; INTRAVENOUS ONCE
Status: DISCONTINUED | OUTPATIENT
Start: 2025-01-27 | End: 2025-01-28 | Stop reason: HOSPADM

## 2025-01-27 RX ORDER — PROPOFOL 10 MG/ML
INJECTION, EMULSION INTRAVENOUS CONTINUOUS PRN
Status: DISCONTINUED | OUTPATIENT
Start: 2025-01-27 | End: 2025-01-27

## 2025-01-27 RX ADMIN — SODIUM CHLORIDE, SODIUM LACTATE, POTASSIUM CHLORIDE, AND CALCIUM CHLORIDE: 600; 310; 30; 20 INJECTION, SOLUTION INTRAVENOUS at 12:12

## 2025-01-27 RX ADMIN — PROPOFOL 30 MG: 10 INJECTION, EMULSION INTRAVENOUS at 12:40

## 2025-01-27 RX ADMIN — LABETALOL HYDROCHLORIDE 5 MG: 5 INJECTION INTRAVENOUS at 13:04

## 2025-01-27 RX ADMIN — HYDRALAZINE HYDROCHLORIDE 10 MG: 20 INJECTION INTRAMUSCULAR; INTRAVENOUS at 14:36

## 2025-01-27 RX ADMIN — LABETALOL HYDROCHLORIDE 10 MG: 5 INJECTION INTRAVENOUS at 12:57

## 2025-01-27 RX ADMIN — LABETALOL HYDROCHLORIDE 10 MG: 5 INJECTION INTRAVENOUS at 13:13

## 2025-01-27 RX ADMIN — LABETALOL HYDROCHLORIDE 10 MG: 5 INJECTION INTRAVENOUS at 12:45

## 2025-01-27 RX ADMIN — LIDOCAINE HYDROCHLORIDE 40 MG: 20 INJECTION INTRAVENOUS at 12:12

## 2025-01-27 RX ADMIN — PROPOFOL 10 MG: 10 INJECTION, EMULSION INTRAVENOUS at 12:17

## 2025-01-27 RX ADMIN — PROMETHAZINE HYDROCHLORIDE 6.25 MG: 25 INJECTION INTRAMUSCULAR; INTRAVENOUS at 15:34

## 2025-01-27 RX ADMIN — PROPOFOL 10 MG: 10 INJECTION, EMULSION INTRAVENOUS at 12:14

## 2025-01-27 RX ADMIN — PROPOFOL 20 MG: 10 INJECTION, EMULSION INTRAVENOUS at 12:21

## 2025-01-27 RX ADMIN — PROPOFOL 10 MG: 10 INJECTION, EMULSION INTRAVENOUS at 12:16

## 2025-01-27 RX ADMIN — PROPOFOL 200 MCG/KG/MIN: 10 INJECTION, EMULSION INTRAVENOUS at 12:12

## 2025-01-27 RX ADMIN — ONDANSETRON 4 MG: 2 INJECTION, SOLUTION INTRAMUSCULAR; INTRAVENOUS at 14:41

## 2025-01-27 ASSESSMENT — COLUMBIA-SUICIDE SEVERITY RATING SCALE - C-SSRS
6. HAVE YOU EVER DONE ANYTHING, STARTED TO DO ANYTHING, OR PREPARED TO DO ANYTHING TO END YOUR LIFE?: NO
1. IN THE PAST MONTH, HAVE YOU WISHED YOU WERE DEAD OR WISHED YOU COULD GO TO SLEEP AND NOT WAKE UP?: NO
2. HAVE YOU ACTUALLY HAD ANY THOUGHTS OF KILLING YOURSELF?: NO

## 2025-01-27 ASSESSMENT — PAIN SCALES - GENERAL
PAINLEVEL_OUTOF10: 0 - NO PAIN

## 2025-01-27 ASSESSMENT — PAIN - FUNCTIONAL ASSESSMENT
PAIN_FUNCTIONAL_ASSESSMENT: 0-10

## 2025-01-27 NOTE — DISCHARGE INSTRUCTIONS

## 2025-01-27 NOTE — H&P
History Of Present Illness  Indu Red is a 74 y.o. female presenting with nausea/vomiting and constipation.  History of adenomatous polyps 2019.     Past Medical History  Past Medical History:   Diagnosis Date    Anemia     Esophagitis     Gastroesophageal reflux disease due to diaphragmatic hernia     Goiter     Hypertension     Left shoulder pain     Multiple sclerosis (Multi)     Personal history of other diseases of the female genital tract 08/03/2016    History of ovarian cyst    Personal history of other diseases of the respiratory system 01/19/2017    History of acute bronchitis    Personal history of other specified conditions 08/03/2016    History of wheezing    Postoperative urinary retention     Shoulder pain, right     Syncope and collapse 05/19/2024    Ulcer of esophagus without bleeding     Esophageal erosions     Surgical History  Past Surgical History:   Procedure Laterality Date    APPENDECTOMY  08/03/2016    COLONOSCOPY  08/03/2016    With polypectomy    ESOPHAGOGASTRODUODENOSCOPY      FOOT SURGERY Left     MR HEAD ANGIO WO IV CONTRAST  07/21/2023    MR HEAD ANGIO WO IV CONTRAST 7/21/2023 [facility]    MR NECK ANGIO WO IV CONTRAST  07/21/2023    MR NECK ANGIO WO IV CONTRAST 7/21/2023 [facility]    OOPHORECTOMY  08/03/2016    OTHER SURGICAL HISTORY  08/03/2016    Cervical Conization    SALPINGECTOMY      SHOULDER SURGERY Right 02/13/2024    TEMPOROMANDIBULAR JOINT SURGERY      THYROIDECTOMY, PARTIAL      Substernal    TOTAL VAGINAL HYSTERECTOMY  03/09/2017     Social History  She reports that she has been smoking cigarettes. She has a 12.5 pack-year smoking history. She has been exposed to tobacco smoke. She has never used smokeless tobacco. She reports that she does not currently use alcohol. She reports current drug use. Frequency: 1.00 time per week. Drug: Marijuana.    Family History  Family History   Problem Relation Name Age of Onset    Other (PCI) Mother  80 - 89        Stent    Heart disease  "Mother      Hypertension Mother      Stomach cancer Father          Allergies  Allergies   Allergen Reactions    Other Anaphylaxis    Tetracyclines Anaphylaxis, Unknown, Nausea And Vomiting and Nausea Only    Oxycodone Nausea And Vomiting and Nausea Only    Oxycodone-Acetaminophen Unknown and Other     vomiting    Penicillins Other and Unknown     \"I STOP BREATHING\"    Pseudoephedrine Nausea And Vomiting and Nausea Only    Acetaminophen-Codeine Unknown    Codeine Nausea/vomiting     DIZZINESS    Psyllium Nausea/vomiting     BLOATING    Sulfa (Sulfonamide Antibiotics) Rash     Review of Systems     Physical Exam  Cardiovascular:      Rate and Rhythm: Regular rhythm.      Heart sounds: Normal heart sounds.   Pulmonary:      Breath sounds: Normal breath sounds. No wheezing.   Abdominal:      Palpations: Abdomen is soft.      Tenderness: There is no abdominal tenderness.          Last Recorded Vitals  Blood pressure (!) 194/99, pulse 70, temperature 36.3 °C (97.3 °F), temperature source Temporal, resp. rate 16, weight 54.4 kg (120 lb), SpO2 100%.    Assessment/Plan   EGD/colonoscopy       Marilyn Cook MD  "

## 2025-01-27 NOTE — ANESTHESIA PREPROCEDURE EVALUATION
Patient: Indu Red    Procedure Information       Date/Time: 01/27/25 1020    Scheduled providers: Marilyn Cook MD    Procedures:       COLONOSCOPY      EGD    Location: [facility]            Relevant Problems   Anesthesia  No family hx MH   (+) PONV (postoperative nausea and vomiting)      Cardiac   (+) Abnormal electrocardiography   (+) Arteriosclerotic heart disease   (+) Hypercholesterolemia   (+) Hypertension      Pulmonary (within normal limits)      Neuro   (+) Cervical radiculitis   (+) Multiple sclerosis (Multi)   (+) Radiculopathy   (+) TIA (transient ischemic attack)      GI   (+) GERD (gastroesophageal reflux disease)      /Renal   (+) Acute cystitis with hematuria      Liver (within normal limits)      Endocrine   (+) Postoperative hypothyroidism      Hematology   (+) Anemia      Musculoskeletal   (+) DJD (degenerative joint disease), cervical   (+) Osteoarthritis   (+) TMJ pain dysfunction syndrome   (+) Unspecified osteoarthritis, unspecified site      HEENT   (+) Nasal sinus congestion      ID (within normal limits)      Skin (within normal limits)      GYN (within normal limits)     73 y.o. female with a past medical history of HTN, HLD, MS, OA, heartburn, hypothyroidism, Pelvic organ/uterine prolapse s/p CINDY  & BSO who presents to GI clinic for N/V and constipation   Ongoing dyspepsia despite PPI trial. Can plan to add EGD on to upcoming surveillance colonoscopy.   Clinical information reviewed:                   NPO Detail:  No data recorded     Physical Exam    Airway  Mallampati: I  TM distance: >3 FB  Neck ROM: full     Cardiovascular - normal exam     Dental   Comments: Poor front chipped   Pulmonary - normal exam     Abdominal          Vitals:    01/27/25 1023   BP: (!) 194/99   Pulse: 70   Resp: 16   Temp: 36.3 °C (97.3 °F)   SpO2: 100%       Past Surgical History:   Procedure Laterality Date    APPENDECTOMY  08/03/2016    COLONOSCOPY  08/03/2016    With  polypectomy    ESOPHAGOGASTRODUODENOSCOPY      FOOT SURGERY Left     MR HEAD ANGIO WO IV CONTRAST  07/21/2023    MR HEAD ANGIO WO IV CONTRAST 7/21/2023 [facility]    MR NECK ANGIO WO IV CONTRAST  07/21/2023    MR NECK ANGIO WO IV CONTRAST 7/21/2023 [facility] MRI    OOPHORECTOMY  08/03/2016    OTHER SURGICAL HISTORY  08/03/2016    Cervical Conization    SALPINGECTOMY      SHOULDER SURGERY Right 02/13/2024    TEMPOROMANDIBULAR JOINT SURGERY      THYROIDECTOMY, PARTIAL      Substernal    TOTAL VAGINAL HYSTERECTOMY  03/09/2017     Past Medical History:   Diagnosis Date    Anemia     Esophagitis     Gastroesophageal reflux disease due to diaphragmatic hernia     Goiter     Hypertension     Left shoulder pain     Multiple sclerosis (Multi)     Personal history of other diseases of the female genital tract 08/03/2016    History of ovarian cyst    Personal history of other diseases of the respiratory system 01/19/2017    History of acute bronchitis    Personal history of other specified conditions 08/03/2016    History of wheezing    Postoperative urinary retention     Shoulder pain, right     Syncope and collapse 05/19/2024    Ulcer of esophagus without bleeding     Esophageal erosions       Current Outpatient Medications:     albuterol 90 mcg/actuation inhaler, Inhale 2 puffs every 6 hours if needed for wheezing or shortness of breath., Disp: , Rfl:     aspirin 81 mg EC tablet, 1 tablet (81 mg) once every 24 hours. AM, Disp: , Rfl:     cholecalciferol (Vitamin D3) 25 MCG (1000 UT) tablet, Take 1 tablet (25 mcg) by mouth once daily. AM, Disp: , Rfl:     gabapentin (Neurontin) 100 mg capsule, Take 1 capsule (100 mg) by mouth once daily in the morning., Disp: 90 capsule, Rfl: 1    gabapentin (Neurontin) 300 mg capsule, Take 1 capsule (300 mg) by mouth once daily at bedtime., Disp: 90 capsule, Rfl: 1    ipratropium (Atrovent) 42 mcg (0.06 %) nasal spray, 2 sprays 4 times a day as needed for rhinitis., Disp: , Rfl:     levothyroxine  "(Synthroid, Levoxyl) 50 mcg tablet, Take 1 tablet (50 mcg) by mouth once daily., Disp: 90 tablet, Rfl: 1    lisinopril 5 mg tablet, TAKE 1 TABLET BY MOUTH EVERY DAY, Disp: 90 tablet, Rfl: 0    metoprolol succinate XL (Toprol-XL) 25 mg 24 hr tablet, TAKE 1 TABLET BY MOUTH EVERY DAY FOR 90 DAYS, Disp: 90 tablet, Rfl: 3    omeprazole (PriLOSEC) 40 mg DR capsule, Take 1 capsule (40 mg) by mouth once daily in the morning. Take before meals. Do not crush or chew., Disp: 90 capsule, Rfl: 3    potassium chloride CR 10 mEq ER tablet, Take 1 tablet (10 mEq) by mouth once daily. DO NOT CRUSH CHEW OR SPLIT, Disp: 90 tablet, Rfl: 3    rosuvastatin (Crestor) 20 mg tablet, Take 1 tablet (20 mg) by mouth once daily., Disp: 100 tablet, Rfl: 3    saccharomyces boulardii (Florastor) 250 mg capsule, Take 1 capsule (250 mg) by mouth 2 times a day., Disp: , Rfl:     tiZANidine (Zanaflex) 4 mg tablet, TAKE 1 TABLET BY MOUTH THREE TIMES A DAY AS NEEDED, Disp: 60 tablet, Rfl: 3    venlafaxine (Effexor) 25 mg tablet, TAKE 1 TABLET BY MOUTH TWICE A DAY, Disp: 180 tablet, Rfl: 1    diclofenac sodium (Voltaren) 1 % gel, Apply 4.5 inches (4 g) topically 2 times a day as needed (pain)., Disp: 200 g, Rfl: 5    lidocaine (Lidoderm) 5 % patch, Place 1 patch over 12 hours on the skin once daily. Apply to painful area 12 hours per day, remove for 12 hours. (Patient not taking: Reported on 1/27/2025), Disp: 30 patch, Rfl: 5    NON FORMULARY, Take 1 each by mouth 2 times a day. P)robiotic \"Norris\", Disp: , Rfl:     ondansetron ODT (Zofran-ODT) 4 mg disintegrating tablet, Take 1 tablet (4 mg) by mouth every 8 hours if needed for nausea or vomiting., Disp: 40 tablet, Rfl: 1    polyethylene glycol (Glycolax, Miralax) 17 gram/dose powder, Mix 17 grams (1 capful) with 8 ounces of liquid and take by mouth 2 times a day. (Patient not taking: Mix of powder and drink. Reported on 1/27/2025), Disp: 1020 g, Rfl: 1  Prior to Admission medications    Medication Sig " Start Date End Date Taking? Authorizing Provider   albuterol 90 mcg/actuation inhaler Inhale 2 puffs every 6 hours if needed for wheezing or shortness of breath. 1/19/17  Yes Historical Provider, MD   aspirin 81 mg EC tablet 1 tablet (81 mg) once every 24 hours. AM   Yes Historical Provider, MD   cholecalciferol (Vitamin D3) 25 MCG (1000 UT) tablet Take 1 tablet (25 mcg) by mouth once daily. AM   Yes Historical Provider, MD   gabapentin (Neurontin) 100 mg capsule Take 1 capsule (100 mg) by mouth once daily in the morning. 6/19/24 6/19/25 Yes Otto Momin MD   gabapentin (Neurontin) 300 mg capsule Take 1 capsule (300 mg) by mouth once daily at bedtime. 10/8/24 10/8/25 Yes Otto Momin MD   ipratropium (Atrovent) 42 mcg (0.06 %) nasal spray 2 sprays 4 times a day as needed for rhinitis.   Yes Historical Provider, MD   levothyroxine (Synthroid, Levoxyl) 50 mcg tablet Take 1 tablet (50 mcg) by mouth once daily. 6/19/24  Yes Otto Momin MD   lisinopril 5 mg tablet TAKE 1 TABLET BY MOUTH EVERY DAY 12/13/24  Yes Otto Momin MD   metoprolol succinate XL (Toprol-XL) 25 mg 24 hr tablet TAKE 1 TABLET BY MOUTH EVERY DAY FOR 90 DAYS 8/5/24 7/31/25 Yes Vance Majano, DO   omeprazole (PriLOSEC) 40 mg DR capsule Take 1 capsule (40 mg) by mouth once daily in the morning. Take before meals. Do not crush or chew. 10/8/24 10/8/25 Yes Otto Momin MD   potassium chloride CR 10 mEq ER tablet Take 1 tablet (10 mEq) by mouth once daily. DO NOT CRUSH CHEW OR SPLIT 7/19/24 7/19/25 Yes Otto Momin MD   rosuvastatin (Crestor) 20 mg tablet Take 1 tablet (20 mg) by mouth once daily. 6/19/24 7/24/25 Yes Otto Momin MD   saccharomyces boulardii (Florastor) 250 mg capsule Take 1 capsule (250 mg) by mouth 2 times a day.   Yes Historical Provider, MD   tiZANidine (Zanaflex) 4 mg tablet TAKE 1 TABLET BY MOUTH THREE TIMES A DAY AS NEEDED 5/10/24  Yes Celina Nielson APRN-CNP   venlafaxine (Effexor) 25 mg tablet TAKE 1 TABLET BY MOUTH TWICE  "A DAY 3/6/24  Yes Don Jett MD   diclofenac sodium (Voltaren) 1 % gel Apply 4.5 inches (4 g) topically 2 times a day as needed (pain). 11/5/24   Otto Momin MD   lidocaine (Lidoderm) 5 % patch Place 1 patch over 12 hours on the skin once daily. Apply to painful area 12 hours per day, remove for 12 hours.  Patient not taking: Reported on 1/27/2025 11/5/24 11/5/25  Otto Momin MD   NON FORMULARY Take 1 each by mouth 2 times a day. P)robiotic \"Norris\"    Historical Provider, MD   ondansetron ODT (Zofran-ODT) 4 mg disintegrating tablet Take 1 tablet (4 mg) by mouth every 8 hours if needed for nausea or vomiting. 8/8/24 8/8/25  Otto Momin MD   polyethylene glycol (Glycolax, Miralax) 17 gram/dose powder Mix 17 grams (1 capful) with 8 ounces of liquid and take by mouth 2 times a day.  Patient not taking: Mix of powder and drink. Reported on 1/27/2025 9/28/24   Linn Hanson MD     Allergies   Allergen Reactions    Other Anaphylaxis    Tetracyclines Anaphylaxis, Unknown, Nausea And Vomiting and Nausea Only    Oxycodone Nausea And Vomiting and Nausea Only    Oxycodone-Acetaminophen Unknown and Other     vomiting    Penicillins Other and Unknown     \"I STOP BREATHING\"    Pseudoephedrine Nausea And Vomiting and Nausea Only    Acetaminophen-Codeine Unknown    Codeine Nausea/vomiting     DIZZINESS    Psyllium Nausea/vomiting     BLOATING    Sulfa (Sulfonamide Antibiotics) Rash     Social History     Tobacco Use    Smoking status: Every Day     Current packs/day: 0.25     Average packs/day: 0.3 packs/day for 50.0 years (12.5 ttl pk-yrs)     Types: Cigarettes     Passive exposure: Current    Smokeless tobacco: Never   Substance Use Topics    Alcohol use: Not Currently         Chemistry    Lab Results   Component Value Date/Time     11/25/2024 1252    K 4.6 11/25/2024 1252     (H) 11/25/2024 1252    CO2 23 11/25/2024 1252    BUN 13 11/25/2024 1252    CREATININE 1.21 (H) 11/25/2024 1252    Lab Results "   Component Value Date/Time    CALCIUM 9.1 11/25/2024 1252    ALKPHOS 76 09/25/2024 2051    AST 10 09/25/2024 2051    ALT 5 (L) 09/25/2024 2051    BILITOT 0.5 09/25/2024 2051          Lab Results   Component Value Date/Time    WBC 6.6 09/28/2024 0853    HGB 10.8 (L) 09/28/2024 0853    HCT 33.2 (L) 09/28/2024 0853     09/28/2024 0853     Lab Results   Component Value Date/Time    PROTIME 11.1 02/24/2020 1141    INR 1.0 02/24/2020 1141     Encounter Date: 09/25/24   ECG 12 lead   Result Value    Ventricular Rate 59    Atrial Rate 59    LA Interval 156    QRS Duration 82    QT Interval 424    QTC Calculation(Bazett) 419    P Axis 57    R Axis -39    T Axis 64    QRS Count 9    Q Onset 216    P Onset 138    P Offset 185    T Offset 428    QTC Fredericia 421    Narrative    Sinus bradycardia  Left axis deviation  Possible Anterolateral infarct , age undetermined  Abnormal ECG  When compared with ECG of 04-JUN-2024 14:25,  QRS axis Shifted left  Borderline criteria for Anterior infarct are now Present  Borderline criteria for Anterolateral infarct are now Present  T wave inversion no longer evident in Inferior leads  Nonspecific T wave abnormality, worse in Lateral leads  See ED provider note for full interpretation and clinical correlation  Confirmed by Marv Almanza ([id number]) on 9/26/2024 4:02:36 AM     No results found for this or any previous visit from the past 1095 days.     Anesthesia Plan    History of general anesthesia?: yes  History of complications of general anesthesia?: no    ASA 3     MAC     intravenous induction   Anesthetic plan and risks discussed with patient.  Use of blood products discussed with patient who consented to blood products.    Plan discussed with CAA and CRNA.

## 2025-01-28 NOTE — ANESTHESIA POSTPROCEDURE EVALUATION
Patient: Indu Red    Procedure Summary       Date: 01/27/25 Room / Location: [facility]    Anesthesia Start: 1205 Anesthesia Stop: 1321    Procedures:       COLONOSCOPY      EGD Diagnosis:       Constipation, unspecified constipation type      History of adenomatous polyp of colon    Scheduled Providers: Marilyn Cook MD Responsible Provider: Adriana House MD    Anesthesia Type: MAC ASA Status: 3            Anesthesia Type: MAC    Vitals Value Taken Time   /95 01/27/25 1600   Temp 36.1 °C (97 °F) 01/27/25 1320   Pulse 66 01/27/25 1600   Resp 18 01/27/25 1600   SpO2 99 % 01/27/25 1600       Anesthesia Post Evaluation    Patient location during evaluation: PACU  Patient participation: complete - patient participated  Level of consciousness: awake  Pain management: adequate  Airway patency: patent  Cardiovascular status: acceptable  Respiratory status: acceptable  Hydration status: acceptable  Postoperative Nausea and Vomiting: none      Patient required anti hypertensive meds to keep BP at high baseline  No notable events documented.

## 2025-01-28 NOTE — HOSPITAL COURSE
Late entry for this afternoon.  After discussing results with patient in recovery area, saw patient twice after that due to patient having episodes of emesis and also complaining of abdominal pain.  On further questioning (and with substantiation from patient's daughter) patient has been having right sided abdominal pain for months.  Patient was given promethazine for nausea.  In addition, anesthesia team ordered hydralazine for hypertension.

## 2025-01-30 NOTE — PROGRESS NOTES
I saw and evaluated the patient. I personally obtained the key and critical portions of the history and physical exam or was physically present for key and critical portions performed by the trainee. I agree with the trainee's medical decision making as described in the trainee's note.    MD Eric Mcguire MD PhD

## 2025-02-06 LAB
LABORATORY COMMENT REPORT: NORMAL
PATH REPORT.FINAL DX SPEC: NORMAL
PATH REPORT.GROSS SPEC: NORMAL
PATH REPORT.RELEVANT HX SPEC: NORMAL
PATH REPORT.TOTAL CANCER: NORMAL
RESIDENT REVIEW: NORMAL

## 2025-02-08 DIAGNOSIS — M54.41 CHRONIC BILATERAL LOW BACK PAIN WITH BILATERAL SCIATICA: ICD-10-CM

## 2025-02-08 DIAGNOSIS — G89.29 CHRONIC BILATERAL LOW BACK PAIN WITH BILATERAL SCIATICA: ICD-10-CM

## 2025-02-08 DIAGNOSIS — M54.42 CHRONIC BILATERAL LOW BACK PAIN WITH BILATERAL SCIATICA: ICD-10-CM

## 2025-02-10 RX ORDER — GABAPENTIN 100 MG/1
100 CAPSULE ORAL EVERY MORNING
Qty: 90 CAPSULE | Refills: 1 | Status: SHIPPED | OUTPATIENT
Start: 2025-02-10 | End: 2026-02-10

## 2025-02-10 NOTE — TELEPHONE ENCOUNTER
Prescription request received and populated   Pharmacy populated  Last Office Visit: 10/08/24 for hospital follow up  Has upcoming appmt 2/18/25

## 2025-02-13 ENCOUNTER — TELEPHONE (OUTPATIENT)
Dept: PRIMARY CARE | Facility: CLINIC | Age: 75
End: 2025-02-13
Payer: COMMERCIAL

## 2025-02-13 NOTE — TELEPHONE ENCOUNTER
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 Dianelys is requesting a call back today.

## 2025-02-18 ENCOUNTER — OFFICE VISIT (OUTPATIENT)
Dept: PRIMARY CARE | Facility: CLINIC | Age: 75
End: 2025-02-18
Payer: COMMERCIAL

## 2025-02-18 VITALS
OXYGEN SATURATION: 96 % | DIASTOLIC BLOOD PRESSURE: 100 MMHG | HEIGHT: 62 IN | BODY MASS INDEX: 21.23 KG/M2 | HEART RATE: 62 BPM | WEIGHT: 115.4 LBS | TEMPERATURE: 96 F | SYSTOLIC BLOOD PRESSURE: 160 MMHG

## 2025-02-18 DIAGNOSIS — Z98.890 PONV (POSTOPERATIVE NAUSEA AND VOMITING): Primary | ICD-10-CM

## 2025-02-18 DIAGNOSIS — N18.31 CHRONIC KIDNEY DISEASE, STAGE 3A (MULTI): ICD-10-CM

## 2025-02-18 DIAGNOSIS — R25.2 CRAMP AND SPASM: ICD-10-CM

## 2025-02-18 DIAGNOSIS — G35 MULTIPLE SCLEROSIS (MULTI): ICD-10-CM

## 2025-02-18 DIAGNOSIS — I10 BENIGN ESSENTIAL HYPERTENSION: ICD-10-CM

## 2025-02-18 DIAGNOSIS — R11.2 PONV (POSTOPERATIVE NAUSEA AND VOMITING): Primary | ICD-10-CM

## 2025-02-18 DIAGNOSIS — Z71.6 ENCOUNTER FOR SMOKING CESSATION COUNSELING: ICD-10-CM

## 2025-02-18 PROBLEM — R94.31 ABNORMAL ELECTROCARDIOGRAPHY: Status: RESOLVED | Noted: 2023-08-25 | Resolved: 2025-02-18

## 2025-02-18 PROBLEM — F17.210 NICOTINE DEPENDENCE, CIGARETTES, UNCOMPLICATED: Status: RESOLVED | Noted: 2023-08-28 | Resolved: 2025-02-18

## 2025-02-18 PROBLEM — Z86.39 HISTORY OF THYROID DISORDER: Status: RESOLVED | Noted: 2024-02-05 | Resolved: 2025-02-18

## 2025-02-18 PROBLEM — E87.8 HYPERCHLOREMIA: Status: RESOLVED | Noted: 2023-08-25 | Resolved: 2025-02-18

## 2025-02-18 PROBLEM — R09.81 NASAL SINUS CONGESTION: Status: RESOLVED | Noted: 2023-08-25 | Resolved: 2025-02-18

## 2025-02-18 PROBLEM — H92.01 REFERRED OTALGIA OF RIGHT EAR: Status: RESOLVED | Noted: 2023-08-25 | Resolved: 2025-02-18

## 2025-02-18 PROBLEM — Z00.00 MEDICARE ANNUAL WELLNESS VISIT, SUBSEQUENT: Status: RESOLVED | Noted: 2024-02-06 | Resolved: 2025-02-18

## 2025-02-18 PROBLEM — F17.200 NICOTINE DEPENDENCE: Status: RESOLVED | Noted: 2023-08-25 | Resolved: 2025-02-18

## 2025-02-18 PROBLEM — R10.13 EPIGASTRIC PAIN: Status: RESOLVED | Noted: 2024-01-05 | Resolved: 2025-02-18

## 2025-02-18 PROBLEM — K56.7 ILEUS (MULTI): Status: RESOLVED | Noted: 2024-09-26 | Resolved: 2025-02-18

## 2025-02-18 PROBLEM — Z00.00 HEALTHCARE MAINTENANCE: Status: RESOLVED | Noted: 2024-02-06 | Resolved: 2025-02-18

## 2025-02-18 PROBLEM — N30.01 ACUTE CYSTITIS WITH HEMATURIA: Status: RESOLVED | Noted: 2024-02-06 | Resolved: 2025-02-18

## 2025-02-18 PROBLEM — Z12.31 ENCOUNTER FOR SCREENING MAMMOGRAM FOR MALIGNANT NEOPLASM OF BREAST: Status: RESOLVED | Noted: 2023-08-28 | Resolved: 2025-02-18

## 2025-02-18 PROBLEM — G45.9 TIA (TRANSIENT ISCHEMIC ATTACK): Status: RESOLVED | Noted: 2023-08-25 | Resolved: 2025-02-18

## 2025-02-18 PROBLEM — N17.9 AKI (ACUTE KIDNEY INJURY) (CMS-HCC): Status: RESOLVED | Noted: 2024-01-07 | Resolved: 2025-02-18

## 2025-02-18 PROBLEM — R79.89 ELEVATED TROPONIN: Status: RESOLVED | Noted: 2024-01-04 | Resolved: 2025-02-18

## 2025-02-18 PROBLEM — E87.0 HYPERNATREMIA: Status: RESOLVED | Noted: 2023-08-25 | Resolved: 2025-02-18

## 2025-02-18 PROBLEM — N89.8 VAGINAL DISCHARGE: Status: RESOLVED | Noted: 2023-08-25 | Resolved: 2025-02-18

## 2025-02-18 PROCEDURE — 99214 OFFICE O/P EST MOD 30 MIN: CPT | Performed by: FAMILY MEDICINE

## 2025-02-18 PROCEDURE — 3008F BODY MASS INDEX DOCD: CPT | Performed by: FAMILY MEDICINE

## 2025-02-18 PROCEDURE — 1158F ADVNC CARE PLAN TLK DOCD: CPT | Performed by: FAMILY MEDICINE

## 2025-02-18 PROCEDURE — 1159F MED LIST DOCD IN RCRD: CPT | Performed by: FAMILY MEDICINE

## 2025-02-18 PROCEDURE — 1123F ACP DISCUSS/DSCN MKR DOCD: CPT | Performed by: FAMILY MEDICINE

## 2025-02-18 PROCEDURE — 1126F AMNT PAIN NOTED NONE PRSNT: CPT | Performed by: FAMILY MEDICINE

## 2025-02-18 PROCEDURE — 1160F RVW MEDS BY RX/DR IN RCRD: CPT | Performed by: FAMILY MEDICINE

## 2025-02-18 PROCEDURE — 3080F DIAST BP >= 90 MM HG: CPT | Performed by: FAMILY MEDICINE

## 2025-02-18 PROCEDURE — G2211 COMPLEX E/M VISIT ADD ON: HCPCS | Performed by: FAMILY MEDICINE

## 2025-02-18 PROCEDURE — 3077F SYST BP >= 140 MM HG: CPT | Performed by: FAMILY MEDICINE

## 2025-02-18 RX ORDER — IBUPROFEN 800 MG/1
TABLET ORAL
COMMUNITY
Start: 2025-02-08

## 2025-02-18 RX ORDER — LISINOPRIL 10 MG/1
10 TABLET ORAL DAILY
Qty: 90 TABLET | Refills: 1 | Status: SHIPPED | OUTPATIENT
Start: 2025-02-18 | End: 2026-02-18

## 2025-02-18 RX ORDER — VARENICLINE TARTRATE 1 MG/1
1 TABLET, FILM COATED ORAL 2 TIMES DAILY
Qty: 60 TABLET | Refills: 2 | Status: SHIPPED | OUTPATIENT
Start: 2025-02-18 | End: 2025-05-19

## 2025-02-18 RX ORDER — TIZANIDINE 4 MG/1
4 TABLET ORAL NIGHTLY PRN
Qty: 90 TABLET | Refills: 3 | Status: SHIPPED | OUTPATIENT
Start: 2025-02-18 | End: 2026-02-18

## 2025-02-18 RX ORDER — VARENICLINE TARTRATE 0.5 (11)-1
KIT ORAL
Qty: 53 EACH | Refills: 0 | Status: SHIPPED | OUTPATIENT
Start: 2025-02-18

## 2025-02-18 ASSESSMENT — PAIN SCALES - GENERAL: PAINLEVEL_OUTOF10: 0-NO PAIN

## 2025-02-18 ASSESSMENT — LIFESTYLE VARIABLES: HOW MANY STANDARD DRINKS CONTAINING ALCOHOL DO YOU HAVE ON A TYPICAL DAY: PATIENT DOES NOT DRINK

## 2025-02-18 NOTE — PATIENT INSTRUCTIONS
-Continue probiotic gummies  -Reduce miralax to half scoop daily until bowel movements  -Get labs done  -Call to follow up with GI, and Cardiology    -Try stopping venlafaxine to see if it makes a difference with mood or leg pain    HTN  -We increase lisinopril from 5mg to 10mg

## 2025-02-18 NOTE — PROGRESS NOTES
"History Of Present Illness  Indu Red is a 74 y.o. female presenting for Follow-up (Hospital follow up on vomiting. )  .    HPI     Still having constipation. Taking miralax, \"double dose\"  twice a week.   Had EGD and colonoscopy 1/27/25    Past Medical History  Patient Active Problem List    Diagnosis Date Noted   • Ileus (Multi) 09/26/2024   • Moderate right ankle sprain 06/18/2024   • Achilles tendon sprain, right, initial encounter 06/18/2024   • Unspecified osteoarthritis, unspecified site 05/06/2024   • Healthcare maintenance 02/06/2024   • Medicare annual wellness visit, subsequent 02/06/2024   • Acute cystitis with hematuria 02/06/2024   • Anemia 02/05/2024   • History of thyroid disorder 02/05/2024   • Tubular adenoma of colon 02/05/2024   • Urinary retention 01/08/2024   • ELLIE (acute kidney injury) (CMS-[facility]) 01/07/2024   • Epigastric pain 01/05/2024   • Hypertension 01/05/2024   • Elevated troponin 01/04/2024   • PONV (postoperative nausea and vomiting) 01/04/2024   • Other sprain of right shoulder joint, sequela 12/19/2023   • Partial nontraumatic tear of right rotator cuff 12/19/2023   • Right shoulder pain 10/12/2023   • Arteriosclerotic heart disease 09/21/2023   • Encounter for screening mammogram for malignant neoplasm of breast 08/28/2023   • Screening for malignant neoplasm of colon 08/28/2023   • Nicotine dependence, cigarettes, uncomplicated 08/28/2023   • Abscess 08/25/2023   • Adhesive capsulitis of right shoulder 08/25/2023   • Age-related nuclear cataract of both eyes 08/25/2023   • Age-related osteoporosis without current pathological fracture 08/25/2023   • Atrophy of vagina 08/25/2023   • Bilateral presbyopia 08/25/2023   • Bradycardia 08/25/2023   • Bursitis of left shoulder 08/25/2023   • Cervical radiculitis 08/25/2023   • [place]leyhorse 08/25/2023   • Chronic pain 08/25/2023   • Chronic pain of multiple joints 08/25/2023   • Allergic rhinitis 08/25/2023   • Chronic kidney disease, " stage 3a (Multi) 08/25/2023   • Constipation due to opioid therapy 08/25/2023   • Diverticulosis 08/25/2023   • DJD (degenerative joint disease), cervical 08/25/2023   • Dry eye syndrome of both lacrimal glands 08/25/2023   • Abnormal electrocardiography 08/25/2023   • GERD (gastroesophageal reflux disease) 08/25/2023   • History of colon polyps 08/25/2023   • Hyperchloremia 08/25/2023   • Hypercholesterolemia 08/25/2023   • Hyperglycemia 08/25/2023   • Hypernatremia 08/25/2023   • Hyperopia of both eyes 08/25/2023   • Incomplete bladder emptying 08/25/2023   • Knee pain 08/25/2023   • Leukocytosis 08/25/2023   • Low back pain 08/25/2023   • Marijuana use 08/25/2023   • Meibomian gland dysfunction (MGD) of left eye 08/25/2023   • Multiple sclerosis (Multi) 08/25/2023   • Nasal sinus congestion 08/25/2023   • Nicotine dependence 08/25/2023   • Nuclear sclerosis 08/25/2023   • OAB (overactive bladder) 08/25/2023   • Osteoarthritis 08/25/2023   • Osteopenia 08/25/2023   • Osteoporosis 08/25/2023   • Pain, pelvic, female 08/25/2023   • Pigmented skin lesion of uncertain nature 08/25/2023   • Postoperative hypothyroidism 08/25/2023   • Postoperative pain 08/25/2023   • PVD (posterior vitreous detachment), both eyes 08/25/2023   • Radiculopathy 08/25/2023   • Referred otalgia of right ear 08/25/2023   • Schatzki's ring 08/25/2023   • Sprain of MCL (medial collateral ligament) of knee 08/25/2023   • Synovial plica syndrome of left knee 08/25/2023   • TIA (transient ischemic attack) 08/25/2023   • TMJ pain dysfunction syndrome 08/25/2023   • Tobacco use disorder 08/25/2023   • Vaginal discharge 08/25/2023   • Vitamin D deficiency 08/25/2023   • Vitreous degeneration 08/25/2023   • Wheezing without diagnosis of asthma 08/25/2023   • Antalgic gait 06/16/2023   • Contracture of joint of left foot 06/16/2023   • Hallux varus (acquired), left foot 06/16/2023   • Hammertoe of second toe of left foot 06/16/2023   • Metatarsalgia  "of left foot 06/16/2023   • Other disturbances of skin sensation 06/16/2023   • Pain in right shoulder 04/10/2023   • Melanocytic nevi of trunk 01/10/2018   • Other seborrheic keratosis 01/10/2018   • S/P right rotator cuff repair 06/21/2016   • Dental abscess 07/22/2014        Medications  Current Outpatient Medications   Medication Sig Dispense Refill   • albuterol 90 mcg/actuation inhaler Inhale 2 puffs every 6 hours if needed for wheezing or shortness of breath.     • aspirin 81 mg EC tablet 1 tablet (81 mg) once every 24 hours. AM     • cholecalciferol (Vitamin D3) 25 MCG (1000 UT) tablet Take 1 tablet (25 mcg) by mouth once daily. AM     • diclofenac sodium (Voltaren) 1 % gel Apply 4.5 inches (4 g) topically 2 times a day as needed (pain). 200 g 5   • gabapentin (Neurontin) 100 mg capsule TAKE 1 CAPSULE (100 MG) BY MOUTH ONCE DAILY IN THE MORNING. 90 capsule 1   • gabapentin (Neurontin) 300 mg capsule Take 1 capsule (300 mg) by mouth once daily at bedtime. 90 capsule 1   • ibuprofen 800 mg tablet      • ipratropium (Atrovent) 42 mcg (0.06 %) nasal spray 2 sprays 4 times a day as needed for rhinitis.     • levothyroxine (Synthroid, Levoxyl) 50 mcg tablet Take 1 tablet (50 mcg) by mouth once daily. 90 tablet 1   • lidocaine (Lidoderm) 5 % patch Place 1 patch over 12 hours on the skin once daily. Apply to painful area 12 hours per day, remove for 12 hours. 30 patch 5   • lisinopril 5 mg tablet TAKE 1 TABLET BY MOUTH EVERY DAY 90 tablet 0   • metoprolol succinate XL (Toprol-XL) 25 mg 24 hr tablet TAKE 1 TABLET BY MOUTH EVERY DAY FOR 90 DAYS 90 tablet 3   • NON FORMULARY Take 1 each by mouth 2 times a day. P)robiotic \"Norris\"     • omeprazole (PriLOSEC) 40 mg DR capsule Take 1 capsule (40 mg) by mouth once daily in the morning. Take before meals. Do not crush or chew. 90 capsule 3   • ondansetron ODT (Zofran-ODT) 4 mg disintegrating tablet Take 1 tablet (4 mg) by mouth every 8 hours if needed for nausea or vomiting. " 40 tablet 1   • polyethylene glycol (Glycolax, Miralax) 17 gram/dose powder Mix 17 grams (1 capful) with 8 ounces of liquid and take by mouth 2 times a day. 1020 g 1   • potassium chloride CR 10 mEq ER tablet Take 1 tablet (10 mEq) by mouth once daily. DO NOT CRUSH CHEW OR SPLIT 90 tablet 3   • rosuvastatin (Crestor) 20 mg tablet Take 1 tablet (20 mg) by mouth once daily. 100 tablet 3   • saccharomyces boulardii (Florastor) 250 mg capsule Take 1 capsule (250 mg) by mouth 2 times a day.     • tiZANidine (Zanaflex) 4 mg tablet TAKE 1 TABLET BY MOUTH THREE TIMES A DAY AS NEEDED 60 tablet 3   • venlafaxine (Effexor) 25 mg tablet TAKE 1 TABLET BY MOUTH TWICE A  tablet 1     No current facility-administered medications for this visit.        Surgical History  She has a past surgical history that includes Appendectomy (08/03/2016); Other surgical history (08/03/2016); Colonoscopy (08/03/2016); Oophorectomy (08/03/2016); Total vaginal hysterectomy (03/09/2017); MR angio neck wo IV contrast (07/21/2023); MR angio head wo IV contrast (07/21/2023); Thyroidectomy, partial; Salpingectomy; Temporomandibular joint surgery; Esophagogastroduodenoscopy; Foot surgery (Left); and Shoulder surgery (Right, 02/13/2024).     Social History  She reports that she has been smoking cigarettes. She has a 12.5 pack-year smoking history. She has been exposed to tobacco smoke. She has never used smokeless tobacco. She reports that she does not currently use alcohol. She reports current drug use. Frequency: 1.00 time per week. Drug: Marijuana.    Family History  Family History   Problem Relation Name Age of Onset   • Other (PCI) Mother  80 - 89        Stent   • Heart disease Mother     • Hypertension Mother     • Stomach cancer Father          Allergies  Other, Tetracyclines, Oxycodone, Oxycodone-acetaminophen, Penicillins, Pseudoephedrine, Acetaminophen-codeine, Codeine, Psyllium, and Sulfa (sulfonamide  "antibiotics)    Immunizations  Immunization History   Administered Date(s) Administered   • COVID-19, mRNA, LNP-S, PF, 30 mcg/0.3 mL dose 11/15/2021   • Flu vaccine, quadrivalent, high-dose, preservative free, age 65y+ (FLUZONE) 11/14/2023   • Flu vaccine, quadrivalent, recombinant, preservative free, adult (FLUBLOK) 11/04/2020   • Flu vaccine, trivalent, preservative free, HIGH-DOSE, age 65y+ (Fluzone) 01/27/2017, 09/26/2017, 11/08/2018, 09/12/2019, 10/11/2021, 10/25/2022, 10/08/2024   • Flu vaccine, trivalent, preservative free, age 6 months and greater (Fluarix/Fluzone/Flulaval) 11/13/2018   • Influenza, Unspecified 10/25/2022   • Influenza, seasonal, injectable 12/04/2012, 09/30/2013, 12/15/2015   • Pfizer COVID-19 vaccine, bivalent, age 12 years and older (30 mcg/0.3 mL) 09/20/2022   • Pfizer Gray Cap SARS-CoV-2 08/18/2022   • Pfizer Purple Cap SARS-CoV-2 03/08/2021, 04/05/2021   • Pneumococcal conjugate vaccine, 13-valent (PREVNAR 13) 05/29/2015, 03/12/2019   • Pneumococcal polysaccharide vaccine, 23-valent, age 2 years and older (PNEUMOVAX 23) 02/25/2013, 10/09/2018   • Tdap vaccine, age 7 year and older (BOOSTRIX, ADACEL) 05/10/2011   • Zoster vaccine, recombinant, adult (SHINGRIX) 09/12/2019, 09/08/2020   • Zoster, live 08/27/2012        ROS  Negative, except as discussed in HPI     Vitals  BP (!) 160/100   Pulse 62   Temp 35.6 °C (96 °F)   Ht 1.575 m (5' 2\")   Wt 52.3 kg (115 lb 6.4 oz)   SpO2 96%   BMI 21.11 kg/m²      Physical Exam    Relevant Results  Lab Results   Component Value Date    WBC 6.6 09/28/2024    WBC 7.1 09/27/2024    HGB 10.8 (L) 09/28/2024    HGB 10.7 (L) 09/27/2024    HCT 33.2 (L) 09/28/2024    HCT 32.5 (L) 09/27/2024    MCV 84 09/28/2024    MCV 82 09/27/2024     09/28/2024     09/27/2024     Lab Results   Component Value Date     11/25/2024     09/28/2024    K 4.6 11/25/2024    K 3.6 09/28/2024     (H) 11/25/2024     09/28/2024    CO2 23 " 11/25/2024    CO2 24 09/28/2024    BUN 13 11/25/2024    BUN 7 09/28/2024    CREATININE 1.21 (H) 11/25/2024    CREATININE 1.05 09/28/2024    CALCIUM 9.1 11/25/2024    CALCIUM 8.7 09/28/2024    PROT 6.6 09/25/2024    PROT 7.1 06/04/2024    BILITOT 0.5 09/25/2024    BILITOT 0.9 06/04/2024    ALKPHOS 76 09/25/2024    ALKPHOS 79 06/04/2024    ALT 5 (L) 09/25/2024    ALT <5 (L) 06/04/2024    AST 10 09/25/2024    AST 10 06/04/2024    GLUCOSE 76 11/25/2024    GLUCOSE 71 (L) 09/28/2024     Lab Results   Component Value Date    HGBA1C 5.4 06/04/2024     Lab Results   Component Value Date    TSH 2.32 12/17/2024    FREET4 1.22 09/26/2024      Lab Results   Component Value Date    CHOL 194 11/25/2024    TRIG 142 11/25/2024    HDL 55.7 11/25/2024    LDLDIRECT 92 02/06/2024           Assessment/Plan   There are no diagnoses linked to this encounter.       Counseling:   Medication education:   -Education:  The patient is counseled regarding potential side-effects of any and all new medications  -Understanding:  Patient expressed understanding of information discussed today  -Adherence:  No barriers to adherence identified    Final treatment plan is a result of shared decision making with patient.         Otto Momin MD    varenicline tartrate (Chantix) 1 mg tablet; Take 1 tablet (1 mg) by mouth 2 times a day. Take with full glass of water.  -     varenicline tartrate (Chantix Starting Month Box) 0.5 mg (11)- 1 mg (42) tablet; Use as directed  Multiple sclerosis (Multi)  Comments:  In remission         Counseling:   Medication education:   -Education:  The patient is counseled regarding potential side-effects of any and all new medications  -Understanding:  Patient expressed understanding of information discussed today  -Adherence:  No barriers to adherence identified    Final treatment plan is a result of shared decision making with patient.         Otto Momin MD

## 2025-02-26 LAB
ALBUMIN SERPL-MCNC: 4 G/DL (ref 3.6–5.1)
BUN SERPL-MCNC: 15 MG/DL (ref 7–25)
BUN/CREAT SERPL: 13 (CALC) (ref 6–22)
CALCIUM SERPL-MCNC: 8.8 MG/DL (ref 8.6–10.4)
CHLORIDE SERPL-SCNC: 112 MMOL/L (ref 98–110)
CO2 SERPL-SCNC: 24 MMOL/L (ref 20–32)
CREAT SERPL-MCNC: 1.15 MG/DL (ref 0.6–1)
EGFRCR SERPLBLD CKD-EPI 2021: 50 ML/MIN/1.73M2
GLUCOSE SERPL-MCNC: 103 MG/DL (ref 65–99)
PHOSPHATE SERPL-MCNC: 3.3 MG/DL (ref 2.1–4.3)
POTASSIUM SERPL-SCNC: 4.6 MMOL/L (ref 3.5–5.3)
SODIUM SERPL-SCNC: 143 MMOL/L (ref 135–146)

## 2025-03-11 ENCOUNTER — APPOINTMENT (OUTPATIENT)
Facility: CLINIC | Age: 75
End: 2025-03-11
Payer: COMMERCIAL

## 2025-03-14 ENCOUNTER — APPOINTMENT (OUTPATIENT)
Dept: ORTHOPEDIC SURGERY | Facility: CLINIC | Age: 75
End: 2025-03-14
Payer: COMMERCIAL

## 2025-03-14 DIAGNOSIS — Z98.890 S/P RIGHT ROTATOR CUFF REPAIR: ICD-10-CM

## 2025-03-14 DIAGNOSIS — M25.511 RIGHT SHOULDER PAIN, UNSPECIFIED CHRONICITY: ICD-10-CM

## 2025-03-22 DIAGNOSIS — E89.0 POSTOPERATIVE HYPOTHYROIDISM: ICD-10-CM

## 2025-03-23 NOTE — ASSESSMENT & PLAN NOTE
Questionable small area of ischemia in the anteroseptal wall on a nuclear stress test in May 2023.  Repeat nuclear stress test in May 2024 revealed no evidence of ischemia and normal left ventricular systolic function with ejection fraction estimated at 70%.  Thus we felt this standard risk factor modification and clinical follow-up would be appropriate.  Patient continues to have chest pains on and off which do not sound anginal.  Again nuclear stress test a year ago did not reveal any evidence of ischemia.  Estimated coronary calcium score only 47 on a CT scan of her chest.  I do not believe we need to repeat stress test at this point in time.  She will be seeing a gastroenterologist in the coming weeks and hopefully will relieve some symptoms for the patient.  Will bring the patient back in approximately 3 months and reassess.

## 2025-03-23 NOTE — ASSESSMENT & PLAN NOTE
I checked lipids in November: Total cholesterol was 194, triglycerides 142, HDL 55 and .  She is having laboratory studies done with the gastroenterologist in the next couple weeks and I will place an order for fasting lipid panel.  Review on return.

## 2025-03-24 ENCOUNTER — OFFICE VISIT (OUTPATIENT)
Dept: PRIMARY CARE | Facility: CLINIC | Age: 75
End: 2025-03-24
Payer: COMMERCIAL

## 2025-03-24 VITALS
WEIGHT: 114 LBS | HEART RATE: 68 BPM | TEMPERATURE: 96.5 F | OXYGEN SATURATION: 98 % | RESPIRATION RATE: 18 BRPM | HEIGHT: 62 IN | BODY MASS INDEX: 20.98 KG/M2 | SYSTOLIC BLOOD PRESSURE: 102 MMHG | DIASTOLIC BLOOD PRESSURE: 78 MMHG

## 2025-03-24 DIAGNOSIS — G89.29 CHRONIC ABDOMINAL PAIN: Primary | ICD-10-CM

## 2025-03-24 DIAGNOSIS — Z12.11 SCREENING FOR MALIGNANT NEOPLASM OF COLON: ICD-10-CM

## 2025-03-24 DIAGNOSIS — A08.4 VIRAL GASTROENTERITIS: ICD-10-CM

## 2025-03-24 DIAGNOSIS — R10.9 CHRONIC ABDOMINAL PAIN: Primary | ICD-10-CM

## 2025-03-24 PROCEDURE — 3008F BODY MASS INDEX DOCD: CPT | Performed by: NURSE PRACTITIONER

## 2025-03-24 PROCEDURE — 1159F MED LIST DOCD IN RCRD: CPT | Performed by: NURSE PRACTITIONER

## 2025-03-24 PROCEDURE — 99214 OFFICE O/P EST MOD 30 MIN: CPT | Performed by: NURSE PRACTITIONER

## 2025-03-24 PROCEDURE — 3078F DIAST BP <80 MM HG: CPT | Performed by: NURSE PRACTITIONER

## 2025-03-24 PROCEDURE — 3074F SYST BP LT 130 MM HG: CPT | Performed by: NURSE PRACTITIONER

## 2025-03-24 PROCEDURE — 1123F ACP DISCUSS/DSCN MKR DOCD: CPT | Performed by: NURSE PRACTITIONER

## 2025-03-24 PROCEDURE — 1125F AMNT PAIN NOTED PAIN PRSNT: CPT | Performed by: NURSE PRACTITIONER

## 2025-03-24 ASSESSMENT — PAIN SCALES - GENERAL: PAINLEVEL_OUTOF10: 10-WORST PAIN EVER

## 2025-03-24 ASSESSMENT — ENCOUNTER SYMPTOMS
RESPIRATORY NEGATIVE: 1
CONSTITUTIONAL NEGATIVE: 1
ABDOMINAL PAIN: 1
MUSCULOSKELETAL NEGATIVE: 1
CARDIOVASCULAR NEGATIVE: 1
CONSTIPATION: 1

## 2025-03-24 NOTE — TELEPHONE ENCOUNTER
Prescription request received and populated   Pharmacy populated  Last Office Visit: 2/18/25 FOR HOSPITAL FOLLOW UP  Has upcoming appmt 5/20/25

## 2025-03-26 RX ORDER — ONDANSETRON 4 MG/1
TABLET, ORALLY DISINTEGRATING ORAL
Qty: 40 TABLET | Refills: 1 | Status: SHIPPED | OUTPATIENT
Start: 2025-03-26

## 2025-03-26 RX ORDER — LEVOTHYROXINE SODIUM 50 UG/1
50 TABLET ORAL DAILY
Qty: 90 TABLET | Refills: 1 | Status: SHIPPED | OUTPATIENT
Start: 2025-03-26

## 2025-04-01 ENCOUNTER — OFFICE VISIT (OUTPATIENT)
Facility: CLINIC | Age: 75
End: 2025-04-01
Payer: COMMERCIAL

## 2025-04-01 VITALS
DIASTOLIC BLOOD PRESSURE: 80 MMHG | BODY MASS INDEX: 22.5 KG/M2 | HEART RATE: 92 BPM | WEIGHT: 123 LBS | SYSTOLIC BLOOD PRESSURE: 140 MMHG | OXYGEN SATURATION: 100 %

## 2025-04-01 DIAGNOSIS — I25.10 ARTERIOSCLEROTIC HEART DISEASE: ICD-10-CM

## 2025-04-01 DIAGNOSIS — I10 PRIMARY HYPERTENSION: Primary | ICD-10-CM

## 2025-04-01 DIAGNOSIS — E78.00 HYPERCHOLESTEROLEMIA: ICD-10-CM

## 2025-04-01 PROCEDURE — 99214 OFFICE O/P EST MOD 30 MIN: CPT | Performed by: INTERNAL MEDICINE

## 2025-04-01 PROCEDURE — 3077F SYST BP >= 140 MM HG: CPT | Performed by: INTERNAL MEDICINE

## 2025-04-01 PROCEDURE — 1123F ACP DISCUSS/DSCN MKR DOCD: CPT | Performed by: INTERNAL MEDICINE

## 2025-04-01 PROCEDURE — 1159F MED LIST DOCD IN RCRD: CPT | Performed by: INTERNAL MEDICINE

## 2025-04-01 PROCEDURE — 1125F AMNT PAIN NOTED PAIN PRSNT: CPT | Performed by: INTERNAL MEDICINE

## 2025-04-01 PROCEDURE — 3079F DIAST BP 80-89 MM HG: CPT | Performed by: INTERNAL MEDICINE

## 2025-04-01 RX ORDER — LISINOPRIL AND HYDROCHLOROTHIAZIDE 12.5; 2 MG/1; MG/1
1 TABLET ORAL DAILY
Qty: 90 TABLET | Refills: 3 | Status: SHIPPED | OUTPATIENT
Start: 2025-04-01 | End: 2026-04-01

## 2025-04-01 ASSESSMENT — ENCOUNTER SYMPTOMS
ABDOMINAL PAIN: 0
DEPRESSION: 0
NUMBNESS: 0
BLURRED VISION: 0
PARESTHESIAS: 0
DYSPNEA ON EXERTION: 0
SHORTNESS OF BREATH: 0
NAUSEA: 1
ANOREXIA: 1
VOMITING: 1
PALPITATIONS: 0
COUGH: 0
HEMATURIA: 0
LOSS OF SENSATION IN FEET: 1
OCCASIONAL FEELINGS OF UNSTEADINESS: 1
DYSURIA: 0

## 2025-04-01 ASSESSMENT — PAIN SCALES - GENERAL: PAINLEVEL_OUTOF10: 7

## 2025-04-01 ASSESSMENT — LIFESTYLE VARIABLES: TOTAL SCORE: 0

## 2025-04-01 NOTE — ASSESSMENT & PLAN NOTE
Blood pressure remains elevated.  I am going to discontinue the lisinopril and add lisinopril/hydrochlorothiazide 20/12.5 mg 1 tablet daily.  Hopefully improve blood pressure control.

## 2025-04-01 NOTE — PROGRESS NOTES
Subjective   Indu Red is a 74 y.o. female.    Chief Complaint:  Hospital Follow-up    HPI  Patient has multiple complaints today.  Tooth and gum pains.  Lots of GI symptoms including nausea vomiting bloating constipation.  She is scheduled to see a gastroenterologist in the coming weeks.She also describes chest pains on and off which are very atypical.    Review of Systems   Constitutional: Positive for malaise/fatigue.   HENT:  Negative for congestion.    Eyes:  Negative for blurred vision.   Cardiovascular:  Negative for chest pain, dyspnea on exertion and palpitations.   Respiratory:  Negative for cough and shortness of breath.    Musculoskeletal:  Negative for joint pain.   Gastrointestinal:  Positive for anorexia, nausea and vomiting. Negative for abdominal pain.   Genitourinary:  Negative for dysuria and hematuria.   Neurological:  Negative for numbness and paresthesias.       Objective   Constitutional:       Appearance: Not in distress.   Eyes:      Conjunctiva/sclera: Conjunctivae normal.   Neck:      Vascular: JVD normal.   Pulmonary:      Breath sounds: Normal breath sounds. No wheezing. No rhonchi. No rales.   Cardiovascular:      Normal rate. Regular rhythm.      Murmurs: There is no murmur.      No gallop.  No click. No rub.   Edema:     Pretibial: bilateral trace edema of the pretibial area.     Ankle: bilateral trace edema of the ankle.     Feet: bilateral trace edema of the feet.  Abdominal:      Palpations: Abdomen is soft.   Neurological:      General: No focal deficit present.      Mental Status: Alert.         Lab Review:   Office Visit on 02/18/2025   Component Date Value    GLUCOSE 02/25/2025 103 (H)     UREA NITROGEN (BUN) 02/25/2025 15     CREATININE 02/25/2025 1.15 (H)     EGFR 02/25/2025 50 (L)     BUN/CREATININE RATIO 02/25/2025 13     SODIUM 02/25/2025 143     POTASSIUM 02/25/2025 4.6     CHLORIDE 02/25/2025 112 (H)     CARBON DIOXIDE 02/25/2025 24     CALCIUM 02/25/2025 8.8      PHOSPHATE (AS PHOSPHORUS) 02/25/2025 3.3     ALBUMIN 02/25/2025 4.0        Assessment/Plan   The primary encounter diagnosis was Primary hypertension. Diagnoses of Hypercholesterolemia and Arteriosclerotic heart disease were also pertinent to this visit.    Arteriosclerotic heart disease  Questionable small area of ischemia in the anteroseptal wall on a nuclear stress test in May 2023.  Repeat nuclear stress test in May 2024 revealed no evidence of ischemia and normal left ventricular systolic function with ejection fraction estimated at 70%.  Thus we felt this standard risk factor modification and clinical follow-up would be appropriate.  Patient continues to have chest pains on and off which do not sound anginal.  Again nuclear stress test a year ago did not reveal any evidence of ischemia.  Estimated coronary calcium score only 47 on a CT scan of her chest.  I do not believe we need to repeat stress test at this point in time.  She will be seeing a gastroenterologist in the coming weeks and hopefully will relieve some symptoms for the patient.  Will bring the patient back in approximately 3 months and reassess.    Hypercholesterolemia  I checked lipids in November: Total cholesterol was 194, triglycerides 142, HDL 55 and .  She is having laboratory studies done with the gastroenterologist in the next couple weeks and I will place an order for fasting lipid panel.  Review on return.    Hypertension  Blood pressure remains elevated.  I am going to discontinue the lisinopril and add lisinopril/hydrochlorothiazide 20/12.5 mg 1 tablet daily.  Hopefully improve blood pressure control.

## 2025-04-10 LAB
CHOLEST SERPL-MCNC: 181 MG/DL
CHOLEST/HDLC SERPL: 2.7 (CALC)
HDLC SERPL-MCNC: 67 MG/DL
LDLC SERPL CALC-MCNC: 90 MG/DL (CALC)
NONHDLC SERPL-MCNC: 114 MG/DL (CALC)
TRIGL SERPL-MCNC: 139 MG/DL

## 2025-04-23 ENCOUNTER — APPOINTMENT (OUTPATIENT)
Facility: CLINIC | Age: 75
End: 2025-04-23
Payer: COMMERCIAL

## 2025-04-23 ENCOUNTER — OFFICE VISIT (OUTPATIENT)
Facility: CLINIC | Age: 75
End: 2025-04-23
Payer: COMMERCIAL

## 2025-04-23 VITALS
HEIGHT: 62 IN | WEIGHT: 116 LBS | DIASTOLIC BLOOD PRESSURE: 111 MMHG | SYSTOLIC BLOOD PRESSURE: 159 MMHG | BODY MASS INDEX: 21.35 KG/M2

## 2025-04-23 DIAGNOSIS — M62.89 PELVIC FLOOR DYSFUNCTION IN FEMALE: Primary | ICD-10-CM

## 2025-04-23 DIAGNOSIS — K59.00 CONSTIPATION, UNSPECIFIED CONSTIPATION TYPE: ICD-10-CM

## 2025-04-23 PROCEDURE — 1123F ACP DISCUSS/DSCN MKR DOCD: CPT | Performed by: OBSTETRICS & GYNECOLOGY

## 2025-04-23 PROCEDURE — 1159F MED LIST DOCD IN RCRD: CPT | Performed by: OBSTETRICS & GYNECOLOGY

## 2025-04-23 PROCEDURE — 1160F RVW MEDS BY RX/DR IN RCRD: CPT | Performed by: OBSTETRICS & GYNECOLOGY

## 2025-04-23 PROCEDURE — 3077F SYST BP >= 140 MM HG: CPT | Performed by: OBSTETRICS & GYNECOLOGY

## 2025-04-23 PROCEDURE — 3008F BODY MASS INDEX DOCD: CPT | Performed by: OBSTETRICS & GYNECOLOGY

## 2025-04-23 PROCEDURE — 3080F DIAST BP >= 90 MM HG: CPT | Performed by: OBSTETRICS & GYNECOLOGY

## 2025-04-23 PROCEDURE — 99203 OFFICE O/P NEW LOW 30 MIN: CPT | Performed by: OBSTETRICS & GYNECOLOGY

## 2025-04-23 PROCEDURE — 99459 PELVIC EXAMINATION: CPT | Performed by: OBSTETRICS & GYNECOLOGY

## 2025-04-23 ASSESSMENT — PATIENT HEALTH QUESTIONNAIRE - PHQ9
1. LITTLE INTEREST OR PLEASURE IN DOING THINGS: NOT AT ALL
2. FEELING DOWN, DEPRESSED OR HOPELESS: NOT AT ALL
SUM OF ALL RESPONSES TO PHQ9 QUESTIONS 1 & 2: 0

## 2025-04-23 NOTE — PROGRESS NOTES
"SUBJECTIVE    74 y.o.  Postmenopausal female presents for   Chief Complaint   Patient presents with    vaginal issue     Vaginal issue      Pt presents as a new history.  She has a asthma, hypothyroidism, HTN, and hyperlipidemia.    Pt reports she is s/p TVH roughly 8-9 years ago for pelvic prolapse.  Pt reports she was sick last year with GI problems and constipation.Because of her straining, she is concerned that her vaginal area is prolapsing.  Her labia appear to be more \"open\" than they used to be.  She reports she does not splint with BM's.    She denies urinary urgency; she does report mild leakage with coughing.    Unable to find operative report but a prior GYN note documents that the patient had a TVH, BSO, apical suspension and A&P repair.    OB/GYN History  No LMP recorded. Patient is postmenopausal.    Social History     Substance and Sexual Activity   Sexual Activity Defer       OB History    Para Term  AB Living   1 1 1 0 0 1   SAB IAB Ectopic Multiple Live Births   0 0 0 0 1      # Outcome Date GA Lbr Tavon/2nd Weight Sex Type Anes PTL Lv   1 Term      Vag-Spont EPI         Past Medical History  She has a past medical history of ELLIE (acute kidney injury) (2024), Anemia, Esophagitis, Gastroesophageal reflux disease due to diaphragmatic hernia, Goiter, Hypertension, Ileus (Multi) (2024), Left shoulder pain, Multiple sclerosis (Multi), Personal history of other diseases of the female genital tract (2016), Personal history of other diseases of the respiratory system (2017), Personal history of other specified conditions (2016), Postoperative urinary retention, Shoulder pain, right, Syncope and collapse (2024), and Ulcer of esophagus without bleeding.    Surgical History  She has a past surgical history that includes Appendectomy (2016); Other surgical history (2016); Colonoscopy (2016); Oophorectomy (2016); Total vaginal " hysterectomy (03/09/2017); MR angio neck wo IV contrast (07/21/2023); MR angio head wo IV contrast (07/21/2023); Thyroidectomy, partial; Salpingectomy; Temporomandibular joint surgery; Esophagogastroduodenoscopy; Foot surgery (Left); and Shoulder surgery (Right, 02/13/2024).     Social History  She reports that she has been smoking cigarettes. She has a 12.5 pack-year smoking history. She has been exposed to tobacco smoke. She has never used smokeless tobacco. She reports that she does not currently use alcohol. She reports current drug use. Frequency: 1.00 time per week. Drug: Marijuana.    Medications:  Current Medications[1]    Screenings  Social Drivers of Health     Tobacco Use: High Risk (4/23/2025)    Patient History     Smoking Tobacco Use: Every Day     Smokeless Tobacco Use: Never     Passive Exposure: Current   Alcohol Use: Not At Risk (2/18/2025)    AUDIT-C     Frequency of Alcohol Consumption: Monthly or less     Average Number of Drinks: Patient does not drink     Frequency of Binge Drinking: Never   Financial Resource Strain: Low Risk  (9/28/2024)    Overall Financial Resource Strain (CARDIA)     Difficulty of Paying Living Expenses: Not hard at all   Food Insecurity: Unknown (1/31/2025)    Received from [facility] Vital Sign     Worried About Running Out of Food in the Last Year: Never true     Ran Out of Food in the Last Year: Not on file   Transportation Needs: No Transportation Needs (9/28/2024)    PRAPARE - Transportation     Lack of Transportation (Medical): No     Lack of Transportation (Non-Medical): No   Physical Activity: Sufficiently Active (6/5/2024)    Exercise Vital Sign     Days of Exercise per Week: 7 days     Minutes of Exercise per Session: 60 min   Stress: No Stress Concern Present (6/5/2024)    [employer] [place] - Occupational Stress Questionnaire     Feeling of Stress : Only a little   Social Connections: Unknown (6/5/2024)    Social  "Connection and Isolation Panel [NHANES]     Frequency of Communication with Friends and Family: More than three times a week     Frequency of Social Gatherings with Friends and Family: More than three times a week     Attends Mandaeism Services: Not on file     Active Member of Clubs or Organizations: Not on file     Attends Club or Organization Meetings: Not on file     Marital Status: Not on file   Intimate Partner Violence: Not At Risk (9/27/2024)    Humiliation, Afraid, Rape, and Kick questionnaire     Fear of Current or Ex-Partner: No     Emotionally Abused: No     Physically Abused: No     Sexually Abused: No   Depression: Not at risk (4/23/2025)    PHQ-2     PHQ-2 Score: 0   Housing Stability: Low Risk  (9/28/2024)    Housing Stability Vital Sign     Unable to Pay for Housing in the Last Year: No     Number of Times Moved in the Last Year: 0     Homeless in the Last Year: No   Utilities: Not At Risk (9/27/2024)    [facility] Utilities     Threatened with loss of utilities: No   Digital Equity: Not on file   Health Literacy: Adequate Health Literacy (6/5/2024)     Health Literacy     Frequency of need for help with medical instructions: Never         OBJECTIVE  Vitals:    04/23/25 1119   BP: (!) 159/111   Weight: 52.6 kg (116 lb)   Height: 1.575 m (5' 2\")     Body mass index is 21.22 kg/m².     Chaperone: Present: yes  Physical Exam  Constitutional:       Appearance: Normal appearance.   Genitourinary:      Rectum normal.      Genitourinary Comments: Exam limited due to pt discomfort throughout the pelvic floor. However, TVL appears to be 7-8 cm and is still well supported with minimal descent both with straining and with standing.  Genital hiatus normal.      Right Labia: No rash, tenderness or lesions.     Left Labia: No tenderness, lesions or rash.     Vaginal tenderness present.      No vaginal discharge or ulceration.      No vaginal prolapse present.     Levator ani is tender, obturator internus is tender " and pelvic spasms present.   Pulmonary:      Effort: Pulmonary effort is normal.   Abdominal:      General: Abdomen is flat. There is no distension.      Tenderness: There is no abdominal tenderness.   Neurological:      Mental Status: She is alert.        ASSESSMENT & PLAN  Problem List Items Addressed This Visit    None  Visit Diagnoses         Pelvic floor dysfunction in female    -  Primary    Relevant Orders    Referral to Physical Therapy      Constipation, unspecified constipation type                Follow up: Minimal evidence of prolapse today. Discussed findings of very tender pelvic floor and likely benefit of pelvic floor PT. Pt aware and interested-- referral placed.    Raphael Bueno MD  Obstetrics & Gynecology  04/23/25       [1]   Current Outpatient Medications:     albuterol 90 mcg/actuation inhaler, Inhale 2 puffs every 6 hours if needed for wheezing or shortness of breath., Disp: , Rfl:     aspirin 81 mg EC tablet, 1 tablet (81 mg) once every 24 hours. AM, Disp: , Rfl:     cholecalciferol (Vitamin D3) 25 MCG (1000 UT) tablet, Take 1 tablet (25 mcg) by mouth once daily. AM, Disp: , Rfl:     diclofenac sodium (Voltaren) 1 % gel, Apply 4.5 inches (4 g) topically 2 times a day as needed (pain)., Disp: 200 g, Rfl: 5    gabapentin (Neurontin) 100 mg capsule, TAKE 1 CAPSULE (100 MG) BY MOUTH ONCE DAILY IN THE MORNING., Disp: 90 capsule, Rfl: 1    gabapentin (Neurontin) 300 mg capsule, Take 1 capsule (300 mg) by mouth once daily at bedtime., Disp: 90 capsule, Rfl: 1    ibuprofen 800 mg tablet, , Disp: , Rfl:     ipratropium (Atrovent) 42 mcg (0.06 %) nasal spray, 2 sprays 4 times a day as needed for rhinitis., Disp: , Rfl:     levothyroxine (Synthroid, Levoxyl) 50 mcg tablet, TAKE 1 TABLET BY MOUTH ONCE DAILY., Disp: 90 tablet, Rfl: 1    lisinopriL-hydrochlorothiazide 20-12.5 mg tablet, Take 1 tablet by mouth once daily., Disp: 90 tablet, Rfl: 3    metoprolol succinate XL (Toprol-XL) 25 mg 24 hr tablet,  TAKE 1 TABLET BY MOUTH EVERY DAY FOR 90 DAYS, Disp: 90 tablet, Rfl: 3    ondansetron ODT (Zofran-ODT) 4 mg disintegrating tablet, DISSOLVE 1 TABLET IN THE MOUTH EVERY 8 HOURS AS NEEDED FOR NAUSEA AND VOMITING, Disp: 40 tablet, Rfl: 1    potassium chloride CR 10 mEq ER tablet, Take 1 tablet (10 mEq) by mouth once daily. DO NOT CRUSH CHEW OR SPLIT, Disp: 90 tablet, Rfl: 3    rosuvastatin (Crestor) 20 mg tablet, Take 1 tablet (20 mg) by mouth once daily., Disp: 100 tablet, Rfl: 3    tiZANidine (Zanaflex) 4 mg tablet, Take 1 tablet (4 mg) by mouth as needed at bedtime for muscle spasms., Disp: 90 tablet, Rfl: 3

## 2025-05-20 ENCOUNTER — OFFICE VISIT (OUTPATIENT)
Dept: PRIMARY CARE | Facility: CLINIC | Age: 75
End: 2025-05-20
Payer: COMMERCIAL

## 2025-05-20 VITALS
BODY MASS INDEX: 21.71 KG/M2 | TEMPERATURE: 97 F | HEIGHT: 62 IN | OXYGEN SATURATION: 99 % | DIASTOLIC BLOOD PRESSURE: 70 MMHG | WEIGHT: 118 LBS | SYSTOLIC BLOOD PRESSURE: 106 MMHG | HEART RATE: 55 BPM

## 2025-05-20 DIAGNOSIS — E03.9 HYPOTHYROIDISM, UNSPECIFIED TYPE: ICD-10-CM

## 2025-05-20 DIAGNOSIS — J30.2 SEASONAL ALLERGIES: ICD-10-CM

## 2025-05-20 DIAGNOSIS — Z87.891 PERSONAL HISTORY OF TOBACCO USE: ICD-10-CM

## 2025-05-20 DIAGNOSIS — K21.9 GASTROESOPHAGEAL REFLUX DISEASE, UNSPECIFIED WHETHER ESOPHAGITIS PRESENT: ICD-10-CM

## 2025-05-20 DIAGNOSIS — E55.9 VITAMIN D DEFICIENCY: ICD-10-CM

## 2025-05-20 DIAGNOSIS — Z12.31 ENCOUNTER FOR SCREENING MAMMOGRAM FOR MALIGNANT NEOPLASM OF BREAST: ICD-10-CM

## 2025-05-20 DIAGNOSIS — Z78.0 MENOPAUSE: ICD-10-CM

## 2025-05-20 DIAGNOSIS — Z00.00 ANNUAL PHYSICAL EXAM: Primary | ICD-10-CM

## 2025-05-20 DIAGNOSIS — F17.200 TOBACCO USE DISORDER: ICD-10-CM

## 2025-05-20 DIAGNOSIS — Z23 ENCOUNTER FOR IMMUNIZATION: ICD-10-CM

## 2025-05-20 PROCEDURE — 99397 PER PM REEVAL EST PAT 65+ YR: CPT | Mod: 25 | Performed by: FAMILY MEDICINE

## 2025-05-20 PROCEDURE — 90715 TDAP VACCINE 7 YRS/> IM: CPT | Performed by: FAMILY MEDICINE

## 2025-05-20 PROCEDURE — 99215 OFFICE O/P EST HI 40 MIN: CPT | Performed by: FAMILY MEDICINE

## 2025-05-20 RX ORDER — FOLIC ACID 1 MG/1
TABLET ORAL DAILY
COMMUNITY

## 2025-05-20 RX ORDER — FAMOTIDINE 20 MG/1
1 TABLET, FILM COATED ORAL
COMMUNITY
Start: 2025-04-21 | End: 2025-05-20 | Stop reason: SDUPTHER

## 2025-05-20 RX ORDER — IPRATROPIUM BROMIDE 42 UG/1
2 SPRAY, METERED NASAL 4 TIMES DAILY PRN
Qty: 15 ML | Refills: 1 | Status: SHIPPED | OUTPATIENT
Start: 2025-05-20

## 2025-05-20 RX ORDER — LANOLIN ALCOHOL/MO/W.PET/CERES
1000 CREAM (GRAM) TOPICAL DAILY
COMMUNITY

## 2025-05-20 RX ORDER — FAMOTIDINE 20 MG/1
20 TABLET, FILM COATED ORAL
Qty: 90 TABLET | Refills: 3 | Status: SHIPPED | OUTPATIENT
Start: 2025-05-20

## 2025-05-20 RX ORDER — FERROUS SULFATE 325(65) MG
325 TABLET, DELAYED RELEASE (ENTERIC COATED) ORAL
COMMUNITY

## 2025-05-20 ASSESSMENT — PAIN SCALES - GENERAL: PAINLEVEL_OUTOF10: 8

## 2025-05-20 ASSESSMENT — LIFESTYLE VARIABLES: HOW MANY STANDARD DRINKS CONTAINING ALCOHOL DO YOU HAVE ON A TYPICAL DAY: PATIENT DOES NOT DRINK

## 2025-05-20 NOTE — PATIENT INSTRUCTIONS
-Call to schedule Gastric emptying test before dental appointments, and CT lung screening in October 2025, and Mammogram  -

## 2025-05-20 NOTE — PROGRESS NOTES
History Of Present Illness  Indu Red is a 74 y.o. female presenting for a wellness exam.    Preventative screenings  Last mammogram 9/2023. Colonoscopy 1/25/25.   Vaccinations are UTD. Last dexa 2021 with osteoporosis    Still smoking but cut back. Has 22+ pack year history, use to be 1 PPD for about 15-20 years, and recently cutting back to quarter pack per day.    Other medical issues/concerns  She has hypertension, follows with Dr. Majano on 4/1/2025.  Her blood pressure was 140/80, her medication was changed from lisinopril to lisinopril hydrochlorothiazide 20-12.5 mg daily. Doing well, no side effects. BP controlled today.     Hypothyroidism, controlled on levothyroxine.  Clinically euthyroid.    Still having stomach issues, but not as bad. Awaiting gastric emptying test, but unable to get it scheduled. Famotidine is helping with the discomfort. Bowel movements regular, normal.     Chronic back pain, but improved with gabapentin. No side effects. OARRS reviewed.      Patient Care Team:  Otto Momin MD as PCP - General (Family Medicine)  Vance Majano DO as Consulting Physician (Cardiology)  Raphael Bueno MD as Obstetrician (Obstetrics and Gynecology)  Celso Shen DPM as Surgeon (Podiatry)  Marilyn Cook MD as Surgeon (Gastroenterology)     Geriatric screening  Still drives, ambulates independently. Lives with and caretaker for elderly mother in 90s. Patient's daughter Jez is HCPOA.    General health: fair  Exercise: walks  Caffeine: Limited   Diet: Balanced  Alcohol use: none    Functional ability:   No cognitive impairment observed.    No cognitive impairment reported by patient or family.    ADL screening:  Hearing without difficulties.  Independent in bathing, dressing, walking in home    IADL screening:  Independent in managing finances, grocery shopping, taking medications, doing housework    Home Safety:   [place] Home safety risk factors: No loose rugs, poor lighting, stairs without  rails, bathroom with no grab bars    Depression screening:  Patient Health Questionnaire-2 Score: 0     The 10-year ASCVD risk score (Raquel FELTON, et al., 2019) is: 20.3%    Values used to calculate the score:      Age: 74 years      Sex: Female      Is Non- : Yes      Diabetic: No      Tobacco smoker: Yes      Systolic Blood Pressure: 106 mmHg      Is BP treated: Yes      HDL Cholesterol: 67 mg/dL      Total Cholesterol: 181 mg/dL       Past Medical History  Patient Active Problem List    Diagnosis Date Noted    Moderate right ankle sprain 06/18/2024    Achilles tendon sprain, right, initial encounter 06/18/2024    Unspecified osteoarthritis, unspecified site 05/06/2024    Anemia 02/05/2024    Tubular adenoma of colon 02/05/2024    Urinary retention 01/08/2024    Hypertension 01/05/2024    PONV (postoperative nausea and vomiting) 01/04/2024    Other sprain of right shoulder joint, sequela 12/19/2023    Partial nontraumatic tear of right rotator cuff 12/19/2023    Right shoulder pain 10/12/2023    Arteriosclerotic heart disease 09/21/2023    Screening for malignant neoplasm of colon 08/28/2023    Abscess 08/25/2023    Adhesive capsulitis of right shoulder 08/25/2023    Age-related nuclear cataract of both eyes 08/25/2023    Age-related osteoporosis without current pathological fracture 08/25/2023    Atrophy of vagina 08/25/2023    Bilateral presbyopia 08/25/2023    Bradycardia 08/25/2023    Bursitis of left shoulder 08/25/2023    Cervical radiculitis 08/25/2023    [place] 08/25/2023    Chronic pain 08/25/2023    Chronic pain of multiple joints 08/25/2023    Allergic rhinitis 08/25/2023    Chronic kidney disease, stage 3a (Multi) 08/25/2023    Constipation due to opioid therapy 08/25/2023    Diverticulosis 08/25/2023    DJD (degenerative joint disease), cervical 08/25/2023    Dry eye syndrome of both lacrimal glands 08/25/2023    GERD (gastroesophageal reflux disease) 08/25/2023    History  of colon polyps 08/25/2023    Hypercholesterolemia 08/25/2023    Hyperglycemia 08/25/2023    Hyperopia of both eyes 08/25/2023    Incomplete bladder emptying 08/25/2023    Knee pain 08/25/2023    Leukocytosis 08/25/2023    Low back pain 08/25/2023    Marijuana use 08/25/2023    Meibomian gland dysfunction (MGD) of left eye 08/25/2023    Multiple sclerosis (Multi) 08/25/2023    Nuclear sclerosis 08/25/2023    OAB (overactive bladder) 08/25/2023    Osteoarthritis 08/25/2023    Osteopenia 08/25/2023    Osteoporosis 08/25/2023    Pain, pelvic, female 08/25/2023    Pigmented skin lesion of uncertain nature 08/25/2023    Postoperative hypothyroidism 08/25/2023    Postoperative pain 08/25/2023    PVD (posterior vitreous detachment), both eyes 08/25/2023    Radiculopathy 08/25/2023    Schatzki's ring 08/25/2023    Sprain of MCL (medial collateral ligament) of knee 08/25/2023    Synovial plica syndrome of left knee 08/25/2023    TMJ pain dysfunction syndrome 08/25/2023    Tobacco use disorder 08/25/2023    Vitamin D deficiency 08/25/2023    Vitreous degeneration 08/25/2023    Wheezing without diagnosis of asthma 08/25/2023    Antalgic gait 06/16/2023    Contracture of joint of left foot 06/16/2023    Hallux varus (acquired), left foot 06/16/2023    Hammertoe of second toe of left foot 06/16/2023    Metatarsalgia of left foot 06/16/2023    Other disturbances of skin sensation 06/16/2023    Pain in right shoulder 04/10/2023    Melanocytic nevi of trunk 01/10/2018    Other seborrheic keratosis 01/10/2018    S/P right rotator cuff repair 06/21/2016        Medications  Medications and current supplements were reviewed and recorded.   Does the patient use opiate medications:  No .  Medications ordered prior to the current encounter[1]     Surgical History  She has a past surgical history that includes Appendectomy (08/03/2016); Other surgical history (08/03/2016); Colonoscopy (08/03/2016); Oophorectomy (08/03/2016); Total vaginal  hysterectomy (03/09/2017); MR angio neck wo IV contrast (07/21/2023); MR angio head wo IV contrast (07/21/2023); Thyroidectomy, partial; Salpingectomy; Temporomandibular joint surgery; Esophagogastroduodenoscopy; Foot surgery (Left); and Shoulder surgery (Right, 02/13/2024).     Social History  She reports that she has been smoking cigarettes. She has a 22.5 pack-year smoking history. She has been exposed to tobacco smoke. She has never used smokeless tobacco. She reports that she does not currently use alcohol. She reports current drug use. Frequency: 1.00 time per week. Drug: Marijuana.    Family History  Family History[2]     Allergies  Other, Tetracyclines, Oxycodone, Oxycodone-acetaminophen, Penicillins, Pseudoephedrine, Acetaminophen-codeine, Codeine, Psyllium, and Sulfa (sulfonamide antibiotics)    Immunizations  Immunization History   Administered Date(s) Administered    COVID-19, mRNA, LNP-S, PF, 30 mcg/0.3 mL dose 11/15/2021    Flu vaccine, quadrivalent, high-dose, preservative free, age 65y+ (FLUZONE) 11/14/2023    Flu vaccine, quadrivalent, recombinant, preservative free, adult (FLUBLOK) 11/04/2020    Flu vaccine, trivalent, preservative free, HIGH-DOSE, age 65y+ (Fluzone) 01/27/2017, 09/26/2017, 11/08/2018, 09/12/2019, 10/11/2021, 10/25/2022, 10/08/2024    Flu vaccine, trivalent, preservative free, age 6 months and greater (Fluarix/Fluzone/Flulaval) 11/13/2018    Influenza, Unspecified 10/25/2022    Influenza, seasonal, injectable 12/04/2012, 09/30/2013, 12/15/2015    Pfizer COVID-19 vaccine, bivalent, age 12 years and older (30 mcg/0.3 mL) 09/20/2022    Pfizer Gray Cap SARS-CoV-2 08/18/2022    Pfizer Purple Cap SARS-CoV-2 03/08/2021, 04/05/2021    Pneumococcal conjugate vaccine, 13-valent (PREVNAR 13) 05/29/2015, 03/12/2019    Pneumococcal polysaccharide vaccine, 23-valent, age 2 years and older (PNEUMOVAX 23) 02/25/2013, 10/09/2018    Tdap vaccine, age 7 year and older (BOOSTRIX, ADACEL) 05/10/2011,  "05/20/2025    Zoster vaccine, recombinant, adult (SHINGRIX) 09/12/2019, 09/08/2020    Zoster, live 08/27/2012        ROS  Negative, except as discussed in HPI     Vitals  /70   Pulse 55   Temp 36.1 °C (97 °F)   Ht 1.575 m (5' 2\")   Wt 53.5 kg (118 lb)   SpO2 99%   BMI 21.58 kg/m²      Physical Exam  Vitals and nursing note reviewed.   Constitutional:       General: She is not in acute distress.     Appearance: Normal appearance.   Cardiovascular:      Rate and Rhythm: Normal rate and regular rhythm.      Heart sounds: Normal heart sounds.   Pulmonary:      Effort: No respiratory distress.      Breath sounds: Normal breath sounds.   Neurological:      General: No focal deficit present.      Mental Status: She is alert. Mental status is at baseline.         Relevant Results  Lab Results   Component Value Date    WBC 6.6 09/28/2024    WBC 7.1 09/27/2024    HGB 10.8 (L) 09/28/2024    HGB 10.7 (L) 09/27/2024    HCT 33.2 (L) 09/28/2024    HCT 32.5 (L) 09/27/2024    MCV 84 09/28/2024    MCV 82 09/27/2024     09/28/2024     09/27/2024     Lab Results   Component Value Date     02/25/2025     11/25/2024    K 4.6 02/25/2025    K 4.6 11/25/2024     (H) 02/25/2025     (H) 11/25/2024    CO2 24 02/25/2025    CO2 23 11/25/2024    BUN 15 02/25/2025    BUN 13 11/25/2024    CREATININE 1.15 (H) 02/25/2025    CREATININE 1.21 (H) 11/25/2024    CALCIUM 8.8 02/25/2025    CALCIUM 9.1 11/25/2024    PROT 6.6 09/25/2024    PROT 7.1 06/04/2024    BILITOT 0.5 09/25/2024    BILITOT 0.9 06/04/2024    ALKPHOS 76 09/25/2024    ALKPHOS 79 06/04/2024    ALT 5 (L) 09/25/2024    ALT <5 (L) 06/04/2024    AST 10 09/25/2024    AST 10 06/04/2024    GLUCOSE 103 (H) 02/25/2025    GLUCOSE 76 11/25/2024     Lab Results   Component Value Date    HGBA1C 5.4 06/04/2024     Lab Results   Component Value Date    TSH 2.32 12/17/2024    FREET4 1.22 09/26/2024      Lab Results   Component Value Date    CHOL 181 " 04/09/2025    TRIG 139 04/09/2025    HDL 67 04/09/2025    LDLDIRECT 92 02/06/2024           Assessment/Plan   Indu was seen today for annual exam.  Diagnoses and all orders for this visit:  Annual physical exam (Primary)  Comments:  UTD  Seasonal allergies  -     ipratropium (Atrovent) 42 mcg (0.06 %) nasal spray; Administer 2 sprays into each nostril 4 times a day as needed for rhinitis.  Tobacco use disorder  -     CT lung screening low dose; Future  Encounter for screening mammogram for malignant neoplasm of breast  -     BI mammo bilateral screening tomosynthesis; Future  Encounter for immunization  -     Tdap vaccine, age 7 years and older  Gastroesophageal reflux disease, unspecified whether esophagitis present  -     famotidine (Pepcid) 20 mg tablet; Take 1 tablet (20 mg) by mouth early in the morning..  Personal history of tobacco use  -     CT lung screening low dose; Future  Vitamin D deficiency  -     Vitamin D 25-Hydroxy,Total (for eval of Vitamin D levels); Future  -     Vitamin D 25-Hydroxy,Total (for eval of Vitamin D levels)  Hypothyroidism, unspecified type  -     TSH with reflex to Free T4 if abnormal; Future  -     TSH with reflex to Free T4 if abnormal  Menopause  -     XR DEXA bone density; Future       Medications Discontinued During This Encounter   Medication Reason    ondansetron ODT (Zofran-ODT) 4 mg disintegrating tablet Therapy completed    ipratropium (Atrovent) 42 mcg (0.06 %) nasal spray Reorder    famotidine (Pepcid) 20 mg tablet Reorder        Counseling:   Medication education:   -Education:  The patient is counseled regarding potential side-effects of any and all new medications  -Understanding:  Patient expressed understanding of information discussed today  -Adherence:  No barriers to adherence identified    Advanced care planning: HCPOA discussed    Final treatment plan is a result of shared decision making with patient.         Otto Momin MD          [1]   Current Outpatient  Medications   Medication Sig Dispense Refill    albuterol 90 mcg/actuation inhaler Inhale 2 puffs every 6 hours if needed for wheezing or shortness of breath.      aspirin 81 mg EC tablet 1 tablet (81 mg) once every 24 hours. AM      cholecalciferol (Vitamin D3) 25 MCG (1000 UT) tablet Take 1 tablet (25 mcg) by mouth once daily. AM      cyanocobalamin (Vitamin B-12) 1,000 mcg tablet Take 1 tablet (1,000 mcg) by mouth once daily.      diclofenac sodium (Voltaren) 1 % gel Apply 4.5 inches (4 g) topically 2 times a day as needed (pain). 200 g 5    ferrous sulfate 325 (65 Fe) mg EC tablet Take 1 tablet by mouth 3 times daily (morning, midday, late afternoon). Do not crush, chew, or split.      folic acid (Folvite) 1 mg tablet Take by mouth once daily.      gabapentin (Neurontin) 100 mg capsule TAKE 1 CAPSULE (100 MG) BY MOUTH ONCE DAILY IN THE MORNING. 90 capsule 1    gabapentin (Neurontin) 300 mg capsule Take 1 capsule (300 mg) by mouth once daily at bedtime. 90 capsule 1    ibuprofen 800 mg tablet       levothyroxine (Synthroid, Levoxyl) 50 mcg tablet TAKE 1 TABLET BY MOUTH ONCE DAILY. 90 tablet 1    lisinopriL-hydrochlorothiazide 20-12.5 mg tablet Take 1 tablet by mouth once daily. 90 tablet 3    metoprolol succinate XL (Toprol-XL) 25 mg 24 hr tablet TAKE 1 TABLET BY MOUTH EVERY DAY FOR 90 DAYS 90 tablet 3    potassium chloride CR 10 mEq ER tablet Take 1 tablet (10 mEq) by mouth once daily. DO NOT CRUSH CHEW OR SPLIT 90 tablet 3    rosuvastatin (Crestor) 20 mg tablet Take 1 tablet (20 mg) by mouth once daily. 100 tablet 3    tiZANidine (Zanaflex) 4 mg tablet Take 1 tablet (4 mg) by mouth as needed at bedtime for muscle spasms. 90 tablet 3    famotidine (Pepcid) 20 mg tablet Take 1 tablet (20 mg) by mouth early in the morning.. 90 tablet 3    ipratropium (Atrovent) 42 mcg (0.06 %) nasal spray Administer 2 sprays into each nostril 4 times a day as needed for rhinitis. 15 mL 1     No current facility-administered  medications for this visit.   [2]   Family History  Problem Relation Name Age of Onset    Other (PCI) Mother  80 - 89        Stent    Heart disease Mother      Hypertension Mother      Stomach cancer Father

## 2025-06-23 ENCOUNTER — APPOINTMENT (OUTPATIENT)
Dept: RADIOLOGY | Facility: HOSPITAL | Age: 75
End: 2025-06-23
Payer: COMMERCIAL

## 2025-06-23 ENCOUNTER — HOSPITAL ENCOUNTER (OUTPATIENT)
Dept: RADIOLOGY | Facility: HOSPITAL | Age: 75
Discharge: HOME | End: 2025-06-23
Payer: COMMERCIAL

## 2025-06-23 DIAGNOSIS — R68.81 EARLY SATIETY: ICD-10-CM

## 2025-06-23 PROCEDURE — A9541 TC99M SULFUR COLLOID: HCPCS

## 2025-06-23 PROCEDURE — 78264 GASTRIC EMPTYING IMG STUDY: CPT

## 2025-06-23 PROCEDURE — 3430000001 HC RX 343 DIAGNOSTIC RADIOPHARMACEUTICALS

## 2025-06-23 PROCEDURE — 78264 GASTRIC EMPTYING IMG STUDY: CPT | Performed by: INTERNAL MEDICINE

## 2025-06-23 RX ADMIN — TECHNETIUM TC 99M SULFUR COLLOID KIT 1 MILLICURIE: KIT at 07:14

## 2025-06-25 ENCOUNTER — HOSPITAL ENCOUNTER (OUTPATIENT)
Dept: RADIOLOGY | Facility: HOSPITAL | Age: 75
Discharge: HOME | End: 2025-06-25
Payer: COMMERCIAL

## 2025-06-25 DIAGNOSIS — R68.81 EARLY SATIETY: ICD-10-CM

## 2025-06-25 PROCEDURE — 78264 GASTRIC EMPTYING IMG STUDY: CPT

## 2025-06-25 PROCEDURE — A9541 TC99M SULFUR COLLOID: HCPCS

## 2025-06-25 PROCEDURE — 3430000001 HC RX 343 DIAGNOSTIC RADIOPHARMACEUTICALS

## 2025-06-25 RX ADMIN — TECHNETIUM TC 99M SULFUR COLLOID KIT 1 MILLICURIE: KIT at 08:15

## 2025-06-27 DIAGNOSIS — M54.42 CHRONIC BILATERAL LOW BACK PAIN WITH BILATERAL SCIATICA: ICD-10-CM

## 2025-06-27 DIAGNOSIS — M54.41 CHRONIC BILATERAL LOW BACK PAIN WITH BILATERAL SCIATICA: ICD-10-CM

## 2025-06-27 DIAGNOSIS — E78.00 HYPERCHOLESTEROLEMIA: ICD-10-CM

## 2025-06-27 DIAGNOSIS — G89.29 CHRONIC BILATERAL LOW BACK PAIN WITH BILATERAL SCIATICA: ICD-10-CM

## 2025-07-01 ENCOUNTER — APPOINTMENT (OUTPATIENT)
Facility: CLINIC | Age: 75
End: 2025-07-01
Payer: COMMERCIAL

## 2025-07-02 NOTE — TELEPHONE ENCOUNTER
Prescription request received and populated   Pharmacy populated  Last Office Visit: 5/20/25  Has upcoming ov 11/25/25

## 2025-07-08 RX ORDER — ROSUVASTATIN CALCIUM 20 MG/1
20 TABLET, COATED ORAL DAILY
Qty: 100 TABLET | Refills: 3 | Status: SHIPPED | OUTPATIENT
Start: 2025-07-08

## 2025-07-08 RX ORDER — GABAPENTIN 300 MG/1
300 CAPSULE ORAL NIGHTLY
Qty: 90 CAPSULE | Refills: 1 | Status: SHIPPED | OUTPATIENT
Start: 2025-07-08 | End: 2026-07-08

## (undated) DEVICE — GOWN, SURGICAL, REINFORCED, XLARGE, X-LONG, STERILE

## (undated) DEVICE — DRAPE, SHEET, VI, W/BETADINE

## (undated) DEVICE — STOCKINETTE, IMPERVIOUS, 12 X 48 IN, LF, STERILE

## (undated) DEVICE — GLOVE, SURGICAL, PROTEXIS PI BLUE W/NEUTHERA, 7.5, PF, LF

## (undated) DEVICE — DRAPE, SHEET, U, STERI DRAPE, 47 X 51 IN, DISPOSABLE, STERILE

## (undated) DEVICE — BANDAGE, COBAN 2, LAYER LITE COMPRESSION, 4 IN, LF

## (undated) DEVICE — SUTURE, VICRYL, 2-0, 36 IN, CT-1, UNDYED

## (undated) DEVICE — DRAPE, SHEET, LARGE, 70 X 85IN, STERILE

## (undated) DEVICE — SUTURE, ETHIBOND, 1, OS-6, 30 IN GREEN

## (undated) DEVICE — Device

## (undated) DEVICE — SUTURE, MONOCRYL, 4-0, 27 IN, PS-2, UNDYED

## (undated) DEVICE — DRAPE PACK, ORTHOPEDIC, SHOULDER, W/POUCH

## (undated) DEVICE — DRAPE, SHEET, U, W/ADHESIVE STRIP, IMPERVIOUS, 60 X 70 IN, DISPOSABLE, LF, STERILE

## (undated) DEVICE — DRESSING, ABDOMINAL, WET PRUF, TENDERSORB, 5 X 9 IN, STERILE

## (undated) DEVICE — SUTURE, ETHIBOND, XTRA, 30 IN, 1, OS-4, GREEN

## (undated) DEVICE — SLING, ARM, SMALL

## (undated) DEVICE — FACE SHIELD, OPTI-COM

## (undated) DEVICE — SOLUTION, IRRIGATION, X RX SODIUM CHL 0.9%, 1000ML BTL

## (undated) DEVICE — STRIP, SKIN CLOSURE, STERI STRIP, REINFORCED, 0.5 X 4 IN

## (undated) DEVICE — NEEDLE, HYPODERMIC, PROEDGE, 22G X 1.5, BLACK